# Patient Record
Sex: MALE | Race: OTHER | Employment: FULL TIME | URBAN - METROPOLITAN AREA
[De-identification: names, ages, dates, MRNs, and addresses within clinical notes are randomized per-mention and may not be internally consistent; named-entity substitution may affect disease eponyms.]

---

## 2017-04-07 ENCOUNTER — HOSPITAL ENCOUNTER (EMERGENCY)
Age: 22
Discharge: HOME | End: 2017-04-07
Payer: COMMERCIAL

## 2017-04-07 ENCOUNTER — APPOINTMENT (OUTPATIENT)
Dept: CT IMAGING | Age: 22
End: 2017-04-07
Payer: COMMERCIAL

## 2017-04-07 VITALS
BODY MASS INDEX: 26.48 KG/M2 | DIASTOLIC BLOOD PRESSURE: 54 MMHG | WEIGHT: 185 LBS | HEART RATE: 91 BPM | RESPIRATION RATE: 16 BRPM | OXYGEN SATURATION: 100 % | TEMPERATURE: 98.5 F | HEIGHT: 70 IN | SYSTOLIC BLOOD PRESSURE: 119 MMHG

## 2017-04-07 DIAGNOSIS — N30.00 ACUTE CYSTITIS WITHOUT HEMATURIA: ICD-10-CM

## 2017-04-07 DIAGNOSIS — R11.11 NON-INTRACTABLE VOMITING WITHOUT NAUSEA, UNSPECIFIED VOMITING TYPE: ICD-10-CM

## 2017-04-07 DIAGNOSIS — R19.7 DIARRHEA, UNSPECIFIED TYPE: ICD-10-CM

## 2017-04-07 DIAGNOSIS — S39.012A STRAIN OF MUSCLE, FASCIA AND TENDON OF LOWER BACK, INITIAL ENCOUNTER: Primary | ICD-10-CM

## 2017-04-07 LAB
ALBUMIN SERPL-MCNC: 4.9 G/DL (ref 3.9–4.9)
ALP BLD-CCNC: 74 U/L (ref 35–104)
ALT SERPL-CCNC: 14 U/L (ref 0–41)
AMPHETAMINE SCREEN, URINE: NORMAL
ANION GAP SERPL CALCULATED.3IONS-SCNC: 11 MEQ/L (ref 7–13)
AST SERPL-CCNC: 18 U/L (ref 0–40)
BACTERIA: NORMAL /HPF
BARBITURATE SCREEN URINE: NORMAL
BASOPHILS ABSOLUTE: 0 K/UL (ref 0–0.2)
BASOPHILS RELATIVE PERCENT: 0.3 %
BENZODIAZEPINE SCREEN, URINE: NORMAL
BILIRUB SERPL-MCNC: 0.7 MG/DL (ref 0–1.2)
BILIRUBIN URINE: ABNORMAL
BLOOD, URINE: NEGATIVE
BUN BLDV-MCNC: 17 MG/DL (ref 6–20)
CALCIUM SERPL-MCNC: 9.3 MG/DL (ref 8.6–10.2)
CANNABINOID SCREEN URINE: NORMAL
CASTS: NORMAL /LPF
CHLORIDE BLD-SCNC: 104 MEQ/L (ref 98–107)
CLARITY: CLEAR
CO2: 25 MEQ/L (ref 22–29)
COCAINE METABOLITE SCREEN URINE: NORMAL
COLOR: ABNORMAL
CREAT SERPL-MCNC: 0.89 MG/DL (ref 0.7–1.2)
EOSINOPHILS ABSOLUTE: 0 K/UL (ref 0–0.7)
EOSINOPHILS RELATIVE PERCENT: 0.1 %
ETHANOL PERCENT: NORMAL G/DL
ETHANOL: <10 MG/DL (ref 0–0.08)
GFR AFRICAN AMERICAN: >60
GFR NON-AFRICAN AMERICAN: >60
GLOBULIN: 2.3 G/DL (ref 2.3–3.5)
GLUCOSE BLD-MCNC: 113 MG/DL (ref 74–109)
GLUCOSE URINE: NEGATIVE MG/DL
HCT VFR BLD CALC: 47 % (ref 42–52)
HEMOGLOBIN: 16.1 G/DL (ref 14–18)
KETONES, URINE: ABNORMAL MG/DL
LACTIC ACID: 1.2 MMOL/L (ref 0.5–2.2)
LEUKOCYTE ESTERASE, URINE: ABNORMAL
LIPASE: 13 U/L (ref 13–60)
LYMPHOCYTES ABSOLUTE: 0.1 K/UL (ref 1–4.8)
LYMPHOCYTES RELATIVE PERCENT: 1.1 %
Lab: NORMAL
MCH RBC QN AUTO: 29.7 PG (ref 27–31.3)
MCHC RBC AUTO-ENTMCNC: 34.3 % (ref 33–37)
MCV RBC AUTO: 86.6 FL (ref 80–100)
MONOCYTES ABSOLUTE: 0.5 K/UL (ref 0.2–0.8)
MONOCYTES RELATIVE PERCENT: 4.3 %
MUCUS: PRESENT
NEUTROPHILS ABSOLUTE: 10 K/UL (ref 1.4–6.5)
NEUTROPHILS RELATIVE PERCENT: 94.2 %
NITRITE, URINE: NEGATIVE
OPIATE SCREEN URINE: NORMAL
PDW BLD-RTO: 13 % (ref 11.5–14.5)
PH UA: 6 (ref 5–9)
PHENCYCLIDINE SCREEN URINE: NORMAL
PLATELET # BLD: 201 K/UL (ref 130–400)
POTASSIUM SERPL-SCNC: 4 MEQ/L (ref 3.5–5.1)
PROTEIN UA: 30 MG/DL
RBC # BLD: 5.42 M/UL (ref 4.7–6.1)
RBC UA: NORMAL /HPF (ref 0–2)
SODIUM BLD-SCNC: 140 MEQ/L (ref 132–144)
SPECIFIC GRAVITY UA: 1.04 (ref 1–1.03)
TOTAL PROTEIN: 7.2 G/DL (ref 6.4–8.1)
URINE REFLEX TO CULTURE: YES
UROBILINOGEN, URINE: 1 E.U./DL
WBC # BLD: 10.6 K/UL (ref 4.8–10.8)
WBC UA: NORMAL /HPF (ref 0–5)

## 2017-04-07 PROCEDURE — 96375 TX/PRO/DX INJ NEW DRUG ADDON: CPT

## 2017-04-07 PROCEDURE — 99284 EMERGENCY DEPT VISIT MOD MDM: CPT

## 2017-04-07 PROCEDURE — G0480 DRUG TEST DEF 1-7 CLASSES: HCPCS

## 2017-04-07 PROCEDURE — 2580000003 HC RX 258: Performed by: PHYSICIAN ASSISTANT

## 2017-04-07 PROCEDURE — 87086 URINE CULTURE/COLONY COUNT: CPT

## 2017-04-07 PROCEDURE — 74176 CT ABD & PELVIS W/O CONTRAST: CPT

## 2017-04-07 PROCEDURE — 36415 COLL VENOUS BLD VENIPUNCTURE: CPT

## 2017-04-07 PROCEDURE — 83605 ASSAY OF LACTIC ACID: CPT

## 2017-04-07 PROCEDURE — 80307 DRUG TEST PRSMV CHEM ANLYZR: CPT

## 2017-04-07 PROCEDURE — 81001 URINALYSIS AUTO W/SCOPE: CPT

## 2017-04-07 PROCEDURE — 96374 THER/PROPH/DIAG INJ IV PUSH: CPT

## 2017-04-07 PROCEDURE — 6360000002 HC RX W HCPCS: Performed by: PHYSICIAN ASSISTANT

## 2017-04-07 PROCEDURE — 80053 COMPREHEN METABOLIC PANEL: CPT

## 2017-04-07 PROCEDURE — 83690 ASSAY OF LIPASE: CPT

## 2017-04-07 PROCEDURE — 85025 COMPLETE CBC W/AUTO DIFF WBC: CPT

## 2017-04-07 RX ORDER — KETOROLAC TROMETHAMINE 30 MG/ML
30 INJECTION, SOLUTION INTRAMUSCULAR; INTRAVENOUS ONCE
Status: COMPLETED | OUTPATIENT
Start: 2017-04-07 | End: 2017-04-07

## 2017-04-07 RX ORDER — ONDANSETRON 4 MG/1
4 TABLET, FILM COATED ORAL EVERY 8 HOURS PRN
Qty: 12 TABLET | Refills: 0 | Status: SHIPPED | OUTPATIENT
Start: 2017-04-07 | End: 2018-08-22 | Stop reason: ALTCHOICE

## 2017-04-07 RX ORDER — SULFAMETHOXAZOLE AND TRIMETHOPRIM 800; 160 MG/1; MG/1
1 TABLET ORAL 2 TIMES DAILY
Qty: 10 TABLET | Refills: 0 | Status: SHIPPED | OUTPATIENT
Start: 2017-04-07 | End: 2017-04-12

## 2017-04-07 RX ORDER — ONDANSETRON 2 MG/ML
4 INJECTION INTRAMUSCULAR; INTRAVENOUS ONCE
Status: COMPLETED | OUTPATIENT
Start: 2017-04-07 | End: 2017-04-07

## 2017-04-07 RX ORDER — NAPROXEN 500 MG/1
500 TABLET ORAL 2 TIMES DAILY WITH MEALS
Qty: 30 TABLET | Refills: 0 | Status: SHIPPED | OUTPATIENT
Start: 2017-04-07 | End: 2018-08-22 | Stop reason: ALTCHOICE

## 2017-04-07 RX ORDER — 0.9 % SODIUM CHLORIDE 0.9 %
1000 INTRAVENOUS SOLUTION INTRAVENOUS ONCE
Status: COMPLETED | OUTPATIENT
Start: 2017-04-07 | End: 2017-04-07

## 2017-04-07 RX ORDER — CYCLOBENZAPRINE HCL 10 MG
10 TABLET ORAL 3 TIMES DAILY PRN
Qty: 30 TABLET | Refills: 0 | Status: SHIPPED | OUTPATIENT
Start: 2017-04-07 | End: 2017-04-17

## 2017-04-07 RX ADMIN — ONDANSETRON 4 MG: 2 INJECTION, SOLUTION INTRAMUSCULAR; INTRAVENOUS at 16:16

## 2017-04-07 RX ADMIN — SODIUM CHLORIDE 1000 ML: 9 INJECTION, SOLUTION INTRAVENOUS at 16:16

## 2017-04-07 RX ADMIN — KETOROLAC TROMETHAMINE 30 MG: 30 INJECTION, SOLUTION INTRAMUSCULAR; INTRAVENOUS at 16:16

## 2017-04-07 ASSESSMENT — PAIN DESCRIPTION - ORIENTATION: ORIENTATION: RIGHT;LEFT

## 2017-04-07 ASSESSMENT — PAIN SCALES - GENERAL
PAINLEVEL_OUTOF10: 7
PAINLEVEL_OUTOF10: 3
PAINLEVEL_OUTOF10: 5
PAINLEVEL_OUTOF10: 8

## 2017-04-07 ASSESSMENT — ENCOUNTER SYMPTOMS
NAUSEA: 1
VOMITING: 1
WHEEZING: 0
COUGH: 0
CONSTIPATION: 0
SORE THROAT: 0
EYE DISCHARGE: 0
SHORTNESS OF BREATH: 0
BACK PAIN: 1
STRIDOR: 0
CHOKING: 0
ABDOMINAL PAIN: 0
COLOR CHANGE: 0
DIARRHEA: 1
RHINORRHEA: 0
ABDOMINAL DISTENTION: 0

## 2017-04-07 ASSESSMENT — PAIN DESCRIPTION - LOCATION
LOCATION: FLANK
LOCATION: BACK

## 2017-04-07 ASSESSMENT — PAIN DESCRIPTION - PAIN TYPE: TYPE: ACUTE PAIN

## 2017-04-09 LAB — URINE CULTURE, ROUTINE: NORMAL

## 2018-08-22 ENCOUNTER — OFFICE VISIT (OUTPATIENT)
Dept: FAMILY MEDICINE CLINIC | Age: 23
End: 2018-08-22
Payer: COMMERCIAL

## 2018-08-22 VITALS
DIASTOLIC BLOOD PRESSURE: 82 MMHG | OXYGEN SATURATION: 99 % | HEART RATE: 98 BPM | WEIGHT: 204.2 LBS | TEMPERATURE: 98.4 F | SYSTOLIC BLOOD PRESSURE: 138 MMHG | BODY MASS INDEX: 28.59 KG/M2 | HEIGHT: 71 IN | RESPIRATION RATE: 16 BRPM

## 2018-08-22 DIAGNOSIS — Z00.00 HEALTH CARE MAINTENANCE: ICD-10-CM

## 2018-08-22 DIAGNOSIS — I10 HYPERTENSION, UNSPECIFIED TYPE: Primary | ICD-10-CM

## 2018-08-22 PROCEDURE — 99203 OFFICE O/P NEW LOW 30 MIN: CPT | Performed by: INTERNAL MEDICINE

## 2018-08-22 RX ORDER — MELOXICAM 15 MG/1
TABLET ORAL
Refills: 0 | COMMUNITY
Start: 2018-08-15 | End: 2020-12-07 | Stop reason: ALTCHOICE

## 2018-08-22 RX ORDER — DOXYCYCLINE HYCLATE 100 MG/1
CAPSULE ORAL
Refills: 0 | COMMUNITY
Start: 2018-08-05 | End: 2018-09-21 | Stop reason: ALTCHOICE

## 2018-08-22 RX ORDER — BLOOD PRESSURE TEST KIT
1 KIT MISCELLANEOUS DAILY
Qty: 1 KIT | Refills: 0 | Status: SHIPPED | OUTPATIENT
Start: 2018-08-22 | End: 2020-12-07 | Stop reason: ALTCHOICE

## 2018-08-22 RX ORDER — TIZANIDINE 4 MG/1
TABLET ORAL
Refills: 1 | COMMUNITY
Start: 2018-08-15 | End: 2020-12-07 | Stop reason: ALTCHOICE

## 2018-08-22 ASSESSMENT — ENCOUNTER SYMPTOMS
VOMITING: 0
NAUSEA: 0
SORE THROAT: 0
RHINORRHEA: 0
WHEEZING: 0
SHORTNESS OF BREATH: 0
COUGH: 0
SINUS PAIN: 0
SINUS PRESSURE: 0
DIARRHEA: 0
ABDOMINAL PAIN: 0

## 2018-08-22 ASSESSMENT — PATIENT HEALTH QUESTIONNAIRE - PHQ9
SUM OF ALL RESPONSES TO PHQ QUESTIONS 1-9: 0
SUM OF ALL RESPONSES TO PHQ QUESTIONS 1-9: 0
SUM OF ALL RESPONSES TO PHQ9 QUESTIONS 1 & 2: 0
2. FEELING DOWN, DEPRESSED OR HOPELESS: 0
1. LITTLE INTEREST OR PLEASURE IN DOING THINGS: 0

## 2018-08-22 NOTE — PROGRESS NOTES
Subjective:      Patient ID: Denzel Geller is a 26 y.o. male    New pt to establish care    HPI     Pt presents to establish care with new PCP. Presented to [facility] with urinary retention and dysuria (whoch he claism is chronic). Urinalysis was negative for UTI. CT pelvis and US testes were negative. He's since been referred to Urology Dr. Sho Marte on Monday. Claims he's been previously treated for UTI in the past but tested negative for STD. Vitals 8/22/2018 4/7/2017 4/7/2017 4/7/2017 4/7/2017 4/5/3741   SYSTOLIC 957  120 395     DIASTOLIC 82  54 64       Vitals 7/0/6705   SYSTOLIC 793   DIASTOLIC 74     Sent to PCP due to BP elevation which he said is not new. Claims SBP in the [facility] ER was 167 and previous readings have been in the 140s. No treatment or evaluation-per pt. No one-sided weakness or slurred speech. Attests to dull left chest pain rated 1/10 and non radiating. No cough or SOB, no wheezing, no fever or chills. Smokes 1 cigar per week, denies illicit drug use. Attests to consumption of 3 cups of coffee and 2L Pepsi daily. Denies snoring, consumes high salt diet. Positive family hx hypertension. History reviewed. No pertinent past medical history. History reviewed. No pertinent surgical history. Social History     Social History    Marital status: Single     Spouse name: N/A    Number of children: N/A    Years of education: N/A     Occupational History    Not on file. Social History Main Topics    Smoking status: Current Some Day Smoker     Years: 1.00     Types: Cigars    Smokeless tobacco: Never Used    Alcohol use Yes      Comment: occassional    Drug use: No    Sexual activity: Yes     Partners: Female     Other Topics Concern    Not on file     Social History Narrative    No narrative on file     History reviewed. No pertinent family history. Allergies:  Patient has no known allergies.   Patient Active Problem List   Diagnosis    Development delay     No current outpatient prescriptions on file prior to visit. No current facility-administered medications on file prior to visit. Review of Systems   Constitutional: Negative for chills, diaphoresis, fatigue and fever. HENT: Negative for congestion, ear discharge, ear pain, postnasal drip, rhinorrhea, sinus pain, sinus pressure, sneezing and sore throat. Respiratory: Negative for cough, shortness of breath and wheezing. Cardiovascular: Negative for chest pain and palpitations. Gastrointestinal: Negative for abdominal pain, diarrhea, nausea and vomiting. Endocrine: Negative for cold intolerance and heat intolerance. Genitourinary: Negative for dysuria and frequency. Neurological: Negative for dizziness and light-headedness. Psychiatric/Behavioral: Negative for decreased concentration. The patient is not nervous/anxious. Objective:   /82   Pulse 98   Temp 98.4 °F (36.9 °C) (Temporal)   Resp 16   Ht 5' 10.5\" (1.791 m)   Wt 204 lb 3.2 oz (92.6 kg)   SpO2 99%   BMI 28.89 kg/m²     Physical Exam   Constitutional: He is oriented to person, place, and time. He appears well-developed and well-nourished. No distress. Cardiovascular: Normal rate, regular rhythm and normal heart sounds. Pulmonary/Chest: Effort normal and breath sounds normal. No respiratory distress. Abdominal: Soft. Normal appearance and bowel sounds are normal. There is no hepatosplenomegaly. There is no tenderness. There is no CVA tenderness. Neurological: He is alert and oriented to person, place, and time. Assessment:       Diagnosis Orders   1. Hypertension, unspecified type  Blood Pressure KIT   2.  Health care maintenance  CBC Auto Differential    Comprehensive Metabolic Panel    TSH Without Reflex    Hemoglobin A1C    Hiv-1,-2 W/Reflex To Hiv-1 Western Blot         Plan:      Orders Placed This Encounter   Procedures    CBC Auto Differential     Standing Status:   Future     Standing Expiration Date:

## 2018-08-30 ENCOUNTER — TELEPHONE (OUTPATIENT)
Dept: FAMILY MEDICINE CLINIC | Age: 23
End: 2018-08-30

## 2018-09-01 NOTE — TELEPHONE ENCOUNTER
Please apprise pt to continue meds as same dose.  He needs to also continue BP measurements and call me with results in another week

## 2018-09-12 ENCOUNTER — TELEPHONE (OUTPATIENT)
Dept: FAMILY MEDICINE CLINIC | Age: 23
End: 2018-09-12

## 2018-09-12 DIAGNOSIS — I10 HYPERTENSION, UNSPECIFIED TYPE: Primary | ICD-10-CM

## 2018-09-12 RX ORDER — LISINOPRIL 10 MG/1
10 TABLET ORAL DAILY
Qty: 30 TABLET | Refills: 3 | Status: SHIPPED | OUTPATIENT
Start: 2018-09-12 | End: 2019-01-04 | Stop reason: SDUPTHER

## 2018-09-21 ENCOUNTER — OFFICE VISIT (OUTPATIENT)
Dept: FAMILY MEDICINE CLINIC | Age: 23
End: 2018-09-21
Payer: COMMERCIAL

## 2018-09-21 VITALS
OXYGEN SATURATION: 98 % | BODY MASS INDEX: 28.14 KG/M2 | TEMPERATURE: 98.6 F | SYSTOLIC BLOOD PRESSURE: 120 MMHG | WEIGHT: 201 LBS | DIASTOLIC BLOOD PRESSURE: 80 MMHG | RESPIRATION RATE: 18 BRPM | HEIGHT: 71 IN | HEART RATE: 114 BPM

## 2018-09-21 DIAGNOSIS — R00.0 TACHYCARDIA: ICD-10-CM

## 2018-09-21 DIAGNOSIS — I10 HYPERTENSION, UNSPECIFIED TYPE: Primary | ICD-10-CM

## 2018-09-21 DIAGNOSIS — M94.0 COSTOCHONDRITIS: ICD-10-CM

## 2018-09-21 PROCEDURE — 99213 OFFICE O/P EST LOW 20 MIN: CPT | Performed by: INTERNAL MEDICINE

## 2018-09-21 PROCEDURE — 93000 ELECTROCARDIOGRAM COMPLETE: CPT | Performed by: INTERNAL MEDICINE

## 2018-09-21 ASSESSMENT — ENCOUNTER SYMPTOMS
SINUS PAIN: 0
DIARRHEA: 0
SHORTNESS OF BREATH: 0
ABDOMINAL PAIN: 0
SORE THROAT: 0
VOMITING: 0
WHEEZING: 0
SINUS PRESSURE: 0
COUGH: 0
RHINORRHEA: 0
NAUSEA: 0

## 2018-09-21 NOTE — PROGRESS NOTES
Subjective:      Patient ID: Lanny Ga is a 26 y.o. male    Follow up for hypertension treatment    HPI     Pt presents for follow up regarding treatment of hypertension. Started on lisinopril. Blood pressure is now well controlled. Vitals 9/21/2018 8/22/2018 4/7/2017 4/7/2017 4/7/2017 7/4/1754   SYSTOLIC 366 063  788 163    DIASTOLIC 80 82  54 64    Pulse 114 98  91 88      Vitals 4/7/2017 6/5/6718   SYSTOLIC  520   DIASTOLIC  74   Pulse  651        Complains of previously being told his heart rate is fast since many years. Pt is tachycardic today as well. Pt denies SOB but attests to crampy left sided chest pain rated 1/10 and non radiating. Chest pain is unrelated to exertion or rest.   Attests to hx of acid reflux but different from current chest pain. Denies use of albuterol. But attests to continued intake of caffeinated drinks (3 cups of coffee and 2L pepsi daily). No fever or chills, smokes 1 cigar per week, denies illicit drug use. No past medical history on file. No past surgical history on file. Social History     Social History    Marital status: Single     Spouse name: N/A    Number of children: N/A    Years of education: N/A     Occupational History    Not on file. Social History Main Topics    Smoking status: Current Some Day Smoker     Years: 1.00     Types: Cigars    Smokeless tobacco: Never Used    Alcohol use Yes      Comment: occassional    Drug use: No    Sexual activity: Yes     Partners: Female     Other Topics Concern    Not on file     Social History Narrative    No narrative on file     No family history on file. Allergies:  Patient has no known allergies.   Patient Active Problem List   Diagnosis    Development delay     Current Outpatient Prescriptions on File Prior to Visit   Medication Sig Dispense Refill    lisinopril (PRINIVIL;ZESTRIL) 10 MG tablet Take 1 tablet by mouth daily 30 tablet 3    tiZANidine (ZANAFLEX) 4 MG tablet   1    Hypertension, unspecified type     2. Tachycardia  EKG 12 lead   3. Costochondritis           Plan:      Orders Placed This Encounter   Procedures    EKG 12 lead     Order Specific Question:   Reason for Exam?     Answer:   Chest pain     Order Specific Question:   Reason for Exam?     Answer: Tachycardia     EKG showed NSR. Palpitations likely due to caffeinated drink consumption. Pt advised to avoid /reduce intake. He seemed reluctant to make this move. OTC NSAIDs prn chest pain. No orders of the defined types were placed in this encounter. Return in about 1 month (around 10/21/2018) for assessment of response to treatment, review of test results.

## 2018-10-04 ENCOUNTER — TELEPHONE (OUTPATIENT)
Dept: FAMILY MEDICINE CLINIC | Age: 23
End: 2018-10-04

## 2018-10-04 DIAGNOSIS — R00.0 TACHYCARDIA: Primary | ICD-10-CM

## 2018-10-10 ENCOUNTER — OFFICE VISIT (OUTPATIENT)
Dept: CARDIOLOGY CLINIC | Age: 23
End: 2018-10-10
Payer: COMMERCIAL

## 2018-10-10 VITALS
SYSTOLIC BLOOD PRESSURE: 130 MMHG | HEART RATE: 83 BPM | OXYGEN SATURATION: 98 % | WEIGHT: 205.8 LBS | DIASTOLIC BLOOD PRESSURE: 78 MMHG | HEIGHT: 71 IN | BODY MASS INDEX: 28.81 KG/M2

## 2018-10-10 DIAGNOSIS — R07.9 CHEST PAIN IN ADULT: ICD-10-CM

## 2018-10-10 DIAGNOSIS — I10 HYPERTENSION GOAL BP (BLOOD PRESSURE) < 130/80: Primary | ICD-10-CM

## 2018-10-10 DIAGNOSIS — R00.0 TACHYCARDIA: ICD-10-CM

## 2018-10-10 PROCEDURE — 99214 OFFICE O/P EST MOD 30 MIN: CPT | Performed by: INTERNAL MEDICINE

## 2018-10-10 RX ORDER — PHENAZOPYRIDINE HYDROCHLORIDE 200 MG/1
200 TABLET, FILM COATED ORAL 3 TIMES DAILY PRN
COMMUNITY
Start: 2018-10-09 | End: 2019-09-19 | Stop reason: SDUPTHER

## 2018-10-10 ASSESSMENT — ENCOUNTER SYMPTOMS
SINUS PAIN: 0
FACIAL SWELLING: 0
COLOR CHANGE: 0
SHORTNESS OF BREATH: 0
BLOOD IN STOOL: 0
ABDOMINAL DISTENTION: 0
EYE DISCHARGE: 0
EYE PAIN: 0
ABDOMINAL PAIN: 0
CHEST TIGHTNESS: 0
SORE THROAT: 0
ANAL BLEEDING: 0
BACK PAIN: 1

## 2018-10-10 NOTE — PROGRESS NOTES
04/07/2017    GLUCOSE 95 12/22/2011    PROT 7.2 04/07/2017    LABALBU 4.9 04/07/2017    LABALBU 5.0 12/22/2011    CALCIUM 9.3 04/07/2017    BILITOT 0.7 04/07/2017    ALKPHOS 74 04/07/2017    AST 18 04/07/2017    ALT 14 04/07/2017     BMP:    Lab Results   Component Value Date     04/07/2017    K 4.0 04/07/2017     04/07/2017    CO2 25 04/07/2017    BUN 17 04/07/2017    LABALBU 4.9 04/07/2017    LABALBU 5.0 12/22/2011    CREATININE 0.89 04/07/2017    CALCIUM 9.3 04/07/2017    GFRAA >60.0 04/07/2017    LABGLOM >60.0 04/07/2017    GLUCOSE 113 04/07/2017    GLUCOSE 95 12/22/2011     Magnesium:  No results found for: MG  TSH:No results found for: TSHFT4, TSH    Patient Active Problem List   Diagnosis    Development delay       There are no discontinued medications. Modified Medications    No medications on file       No orders of the defined types were placed in this encounter. Assessment/Plan:     Diagnosis Orders   1. Hypertension goal BP (blood pressure) < 130/80  ECHO Complete 2D W Doppler W Color   2. Tachycardia  ECHO Complete 2D W Doppler W Color   3. Chest pain in adult  NM MYOCARDIAL SPECT REST EXERCISE OR RX      The chest pain was thought to be rather atypical.  in a young person there is remote possibility of vascular abnormality but the probability is low. Patient does have labile hypertension at this time. We will do an exercise tolerance test to assess his response off blood pressure to the exercise. We'll check an echocardiogram.  Patient does not need aspirin at this time. Electrocardiogram was normal.  The patient has a faint systolic murmur in the aortic area and peripheral pulses are symmetrical.  This was most likely thought to be a flow murmur. Patient was advised to monitor blood pressure at home and bring the list.  Coronary anatomy and symptoms related to CAD /Atherosclerosis were discussed in detail. Patient expressed understanding of these issues.   Risk factors for

## 2018-10-30 ENCOUNTER — OFFICE VISIT (OUTPATIENT)
Dept: FAMILY MEDICINE CLINIC | Age: 23
End: 2018-10-30
Payer: COMMERCIAL

## 2018-10-30 VITALS
WEIGHT: 200.6 LBS | DIASTOLIC BLOOD PRESSURE: 62 MMHG | BODY MASS INDEX: 28.38 KG/M2 | HEART RATE: 81 BPM | SYSTOLIC BLOOD PRESSURE: 122 MMHG | TEMPERATURE: 97.6 F | RESPIRATION RATE: 12 BRPM

## 2018-10-30 DIAGNOSIS — Z87.898 HISTORY OF TACHYCARDIA: ICD-10-CM

## 2018-10-30 DIAGNOSIS — I10 HYPERTENSION, UNSPECIFIED TYPE: Primary | ICD-10-CM

## 2018-10-30 PROCEDURE — 99213 OFFICE O/P EST LOW 20 MIN: CPT | Performed by: INTERNAL MEDICINE

## 2018-10-30 RX ORDER — BACLOFEN 10 MG/1
TABLET ORAL
Refills: 0 | COMMUNITY
Start: 2018-10-25 | End: 2019-09-19

## 2018-10-30 RX ORDER — CIPROFLOXACIN 500 MG/1
TABLET, FILM COATED ORAL
Refills: 0 | COMMUNITY
Start: 2018-10-26 | End: 2019-09-19 | Stop reason: ALTCHOICE

## 2018-10-30 RX ORDER — DICLOFENAC SODIUM 75 MG/1
TABLET, DELAYED RELEASE ORAL
Refills: 0 | COMMUNITY
Start: 2018-10-24 | End: 2019-09-19

## 2018-10-30 ASSESSMENT — ENCOUNTER SYMPTOMS
DIARRHEA: 0
NAUSEA: 0
VOMITING: 0
ABDOMINAL PAIN: 0
SINUS PAIN: 0
SORE THROAT: 0
WHEEZING: 0
COUGH: 0
SHORTNESS OF BREATH: 0
RHINORRHEA: 0
SINUS PRESSURE: 0

## 2018-11-27 DIAGNOSIS — R00.0 TACHYCARDIA: ICD-10-CM

## 2018-11-27 DIAGNOSIS — I10 HYPERTENSION GOAL BP (BLOOD PRESSURE) < 130/80: ICD-10-CM

## 2019-01-04 ENCOUNTER — OFFICE VISIT (OUTPATIENT)
Dept: FAMILY MEDICINE CLINIC | Age: 24
End: 2019-01-04
Payer: COMMERCIAL

## 2019-01-04 VITALS
BODY MASS INDEX: 27.72 KG/M2 | WEIGHT: 198 LBS | RESPIRATION RATE: 18 BRPM | SYSTOLIC BLOOD PRESSURE: 122 MMHG | OXYGEN SATURATION: 99 % | DIASTOLIC BLOOD PRESSURE: 72 MMHG | HEART RATE: 79 BPM | HEIGHT: 71 IN | TEMPERATURE: 97.3 F

## 2019-01-04 DIAGNOSIS — R07.0 THROAT DISCOMFORT: Primary | ICD-10-CM

## 2019-01-04 DIAGNOSIS — I10 HYPERTENSION, UNSPECIFIED TYPE: ICD-10-CM

## 2019-01-04 DIAGNOSIS — B07.9 VIRAL WART ON FINGER: ICD-10-CM

## 2019-01-04 PROCEDURE — 99213 OFFICE O/P EST LOW 20 MIN: CPT | Performed by: INTERNAL MEDICINE

## 2019-01-04 RX ORDER — PREDNISONE 20 MG/1
40 TABLET ORAL DAILY
Qty: 14 TABLET | Refills: 0 | Status: SHIPPED | OUTPATIENT
Start: 2019-01-04 | End: 2019-01-11

## 2019-01-04 RX ORDER — METAXALONE 800 MG/1
TABLET ORAL
Refills: 0 | COMMUNITY
Start: 2018-12-06 | End: 2019-09-19

## 2019-01-04 RX ORDER — LISINOPRIL 10 MG/1
10 TABLET ORAL DAILY
Qty: 90 TABLET | Refills: 3 | Status: SHIPPED | OUTPATIENT
Start: 2019-01-04 | End: 2019-09-19 | Stop reason: SDUPTHER

## 2019-01-04 RX ORDER — LIDOCAINE 50 MG/G
PATCH TOPICAL
Refills: 0 | COMMUNITY
Start: 2018-11-19 | End: 2019-01-30 | Stop reason: ALTCHOICE

## 2019-01-06 ASSESSMENT — ENCOUNTER SYMPTOMS
DIARRHEA: 0
SORE THROAT: 0
VOICE CHANGE: 0
WHEEZING: 0
COUGH: 0
VOMITING: 0
SHORTNESS OF BREATH: 0
SINUS PAIN: 0
NAUSEA: 0
ABDOMINAL PAIN: 0
TROUBLE SWALLOWING: 0
SINUS PRESSURE: 0

## 2019-01-18 ENCOUNTER — TELEPHONE (OUTPATIENT)
Dept: FAMILY MEDICINE CLINIC | Age: 24
End: 2019-01-18

## 2019-01-30 ENCOUNTER — OFFICE VISIT (OUTPATIENT)
Dept: FAMILY MEDICINE CLINIC | Age: 24
End: 2019-01-30
Payer: COMMERCIAL

## 2019-01-30 VITALS
RESPIRATION RATE: 18 BRPM | BODY MASS INDEX: 28.14 KG/M2 | SYSTOLIC BLOOD PRESSURE: 124 MMHG | OXYGEN SATURATION: 98 % | HEIGHT: 71 IN | TEMPERATURE: 97.8 F | WEIGHT: 201 LBS | HEART RATE: 73 BPM | DIASTOLIC BLOOD PRESSURE: 66 MMHG

## 2019-01-30 DIAGNOSIS — K21.9 GASTROESOPHAGEAL REFLUX DISEASE WITHOUT ESOPHAGITIS: Primary | ICD-10-CM

## 2019-01-30 PROCEDURE — 99213 OFFICE O/P EST LOW 20 MIN: CPT | Performed by: INTERNAL MEDICINE

## 2019-01-30 RX ORDER — LIDOCAINE 36 MG/1
PATCH TOPICAL
COMMUNITY
Start: 2019-01-25 | End: 2020-12-07 | Stop reason: ALTCHOICE

## 2019-01-30 RX ORDER — PANTOPRAZOLE SODIUM 20 MG/1
20 TABLET, DELAYED RELEASE ORAL
Qty: 30 TABLET | Refills: 1 | Status: SHIPPED | OUTPATIENT
Start: 2019-01-30 | End: 2019-09-19

## 2019-01-30 ASSESSMENT — ENCOUNTER SYMPTOMS
ABDOMINAL PAIN: 0
SINUS PAIN: 0
NAUSEA: 0
COUGH: 0
DIARRHEA: 0
SORE THROAT: 1
SINUS PRESSURE: 0
SHORTNESS OF BREATH: 0
RHINORRHEA: 0
WHEEZING: 0
TROUBLE SWALLOWING: 0
VOMITING: 0
VOICE CHANGE: 0

## 2019-02-21 ENCOUNTER — TELEPHONE (OUTPATIENT)
Dept: FAMILY MEDICINE CLINIC | Age: 24
End: 2019-02-21

## 2019-02-21 DIAGNOSIS — R13.10 DYSPHAGIA, UNSPECIFIED TYPE: Primary | ICD-10-CM

## 2019-03-12 ENCOUNTER — OFFICE VISIT (OUTPATIENT)
Dept: GASTROENTEROLOGY | Age: 24
End: 2019-03-12
Payer: COMMERCIAL

## 2019-03-12 VITALS
WEIGHT: 205 LBS | RESPIRATION RATE: 16 BRPM | HEART RATE: 81 BPM | SYSTOLIC BLOOD PRESSURE: 139 MMHG | BODY MASS INDEX: 29 KG/M2 | DIASTOLIC BLOOD PRESSURE: 120 MMHG

## 2019-03-12 DIAGNOSIS — R09.89 GLOBUS SENSATION: Primary | ICD-10-CM

## 2019-03-12 DIAGNOSIS — R12 HEARTBURN: ICD-10-CM

## 2019-03-12 DIAGNOSIS — R13.10 DYSPHAGIA, UNSPECIFIED TYPE: ICD-10-CM

## 2019-03-12 PROCEDURE — 99203 OFFICE O/P NEW LOW 30 MIN: CPT | Performed by: INTERNAL MEDICINE

## 2019-03-25 ENCOUNTER — HOSPITAL ENCOUNTER (OUTPATIENT)
Age: 24
Setting detail: OUTPATIENT SURGERY
Discharge: HOME | End: 2019-03-25
Attending: INTERNAL MEDICINE | Admitting: INTERNAL MEDICINE
Payer: COMMERCIAL

## 2019-03-25 ENCOUNTER — ANESTHESIA (OUTPATIENT)
Dept: ENDOSCOPY | Age: 24
End: 2019-03-25
Payer: COMMERCIAL

## 2019-03-25 ENCOUNTER — ANESTHESIA EVENT (OUTPATIENT)
Dept: ENDOSCOPY | Age: 24
End: 2019-03-25
Payer: COMMERCIAL

## 2019-03-25 VITALS
OXYGEN SATURATION: 98 % | DIASTOLIC BLOOD PRESSURE: 60 MMHG | SYSTOLIC BLOOD PRESSURE: 114 MMHG | RESPIRATION RATE: 10 BRPM

## 2019-03-25 VITALS
SYSTOLIC BLOOD PRESSURE: 99 MMHG | OXYGEN SATURATION: 96 % | HEIGHT: 69 IN | WEIGHT: 200 LBS | HEART RATE: 65 BPM | RESPIRATION RATE: 20 BRPM | DIASTOLIC BLOOD PRESSURE: 62 MMHG | TEMPERATURE: 97.7 F | BODY MASS INDEX: 29.62 KG/M2

## 2019-03-25 PROCEDURE — 3700000000 HC ANESTHESIA ATTENDED CARE: Performed by: INTERNAL MEDICINE

## 2019-03-25 PROCEDURE — 7100000010 HC PHASE II RECOVERY - FIRST 15 MIN: Performed by: INTERNAL MEDICINE

## 2019-03-25 PROCEDURE — 88305 TISSUE EXAM BY PATHOLOGIST: CPT

## 2019-03-25 PROCEDURE — 2500000003 HC RX 250 WO HCPCS: Performed by: NURSE ANESTHETIST, CERTIFIED REGISTERED

## 2019-03-25 PROCEDURE — 2580000003 HC RX 258: Performed by: NURSE ANESTHETIST, CERTIFIED REGISTERED

## 2019-03-25 PROCEDURE — 6360000002 HC RX W HCPCS: Performed by: NURSE ANESTHETIST, CERTIFIED REGISTERED

## 2019-03-25 PROCEDURE — 7100000011 HC PHASE II RECOVERY - ADDTL 15 MIN: Performed by: INTERNAL MEDICINE

## 2019-03-25 PROCEDURE — 43239 EGD BIOPSY SINGLE/MULTIPLE: CPT | Performed by: INTERNAL MEDICINE

## 2019-03-25 PROCEDURE — 3609017100 HC EGD: Performed by: INTERNAL MEDICINE

## 2019-03-25 PROCEDURE — 88342 IMHCHEM/IMCYTCHM 1ST ANTB: CPT

## 2019-03-25 PROCEDURE — 2580000003 HC RX 258: Performed by: INTERNAL MEDICINE

## 2019-03-25 PROCEDURE — 43248 EGD GUIDE WIRE INSERTION: CPT | Performed by: INTERNAL MEDICINE

## 2019-03-25 RX ORDER — PROPOFOL 10 MG/ML
INJECTION, EMULSION INTRAVENOUS PRN
Status: DISCONTINUED | OUTPATIENT
Start: 2019-03-25 | End: 2019-03-25 | Stop reason: SDUPTHER

## 2019-03-25 RX ORDER — LIDOCAINE HYDROCHLORIDE 20 MG/ML
INJECTION, SOLUTION INFILTRATION; PERINEURAL PRN
Status: DISCONTINUED | OUTPATIENT
Start: 2019-03-25 | End: 2019-03-25 | Stop reason: SDUPTHER

## 2019-03-25 RX ORDER — ONDANSETRON 2 MG/ML
4 INJECTION INTRAMUSCULAR; INTRAVENOUS
Status: DISCONTINUED | OUTPATIENT
Start: 2019-03-25 | End: 2019-03-25 | Stop reason: HOSPADM

## 2019-03-25 RX ORDER — SODIUM CHLORIDE 9 MG/ML
INJECTION, SOLUTION INTRAVENOUS CONTINUOUS PRN
Status: DISCONTINUED | OUTPATIENT
Start: 2019-03-25 | End: 2019-03-25 | Stop reason: SDUPTHER

## 2019-03-25 RX ORDER — GABAPENTIN 300 MG/1
300 CAPSULE ORAL 3 TIMES DAILY
COMMUNITY
End: 2019-09-19 | Stop reason: ALTCHOICE

## 2019-03-25 RX ORDER — SODIUM CHLORIDE 9 MG/ML
INJECTION, SOLUTION INTRAVENOUS CONTINUOUS
Status: DISCONTINUED | OUTPATIENT
Start: 2019-03-25 | End: 2019-03-25 | Stop reason: HOSPADM

## 2019-03-25 RX ADMIN — SODIUM CHLORIDE: 9 INJECTION, SOLUTION INTRAVENOUS at 08:30

## 2019-03-25 RX ADMIN — LIDOCAINE HYDROCHLORIDE 60 MG: 20 INJECTION, SOLUTION INFILTRATION; PERINEURAL at 08:49

## 2019-03-25 RX ADMIN — PROPOFOL 450 MG: 10 INJECTION, EMULSION INTRAVENOUS at 08:49

## 2019-03-25 RX ADMIN — SODIUM CHLORIDE: 9 INJECTION, SOLUTION INTRAVENOUS at 08:49

## 2019-03-25 ASSESSMENT — PAIN SCALES - GENERAL: PAINLEVEL_OUTOF10: 5

## 2019-03-25 ASSESSMENT — PAIN DESCRIPTION - DESCRIPTORS: DESCRIPTORS: CONSTANT;PRESSURE

## 2019-03-25 ASSESSMENT — PAIN DESCRIPTION - FREQUENCY: FREQUENCY: CONTINUOUS

## 2019-03-25 ASSESSMENT — PAIN DESCRIPTION - LOCATION: LOCATION: BACK

## 2019-03-25 ASSESSMENT — PAIN - FUNCTIONAL ASSESSMENT: PAIN_FUNCTIONAL_ASSESSMENT: 0-10

## 2019-05-13 ENCOUNTER — OFFICE VISIT (OUTPATIENT)
Dept: FAMILY MEDICINE CLINIC | Age: 24
End: 2019-05-13
Payer: COMMERCIAL

## 2019-05-13 VITALS
SYSTOLIC BLOOD PRESSURE: 138 MMHG | HEIGHT: 71 IN | WEIGHT: 199.8 LBS | OXYGEN SATURATION: 100 % | BODY MASS INDEX: 27.97 KG/M2 | HEART RATE: 83 BPM | RESPIRATION RATE: 12 BRPM | DIASTOLIC BLOOD PRESSURE: 72 MMHG | TEMPERATURE: 98.1 F

## 2019-05-13 DIAGNOSIS — Z01.818 PRE-OP EVALUATION: Primary | ICD-10-CM

## 2019-05-13 LAB
ALBUMIN: 5 G/DL (ref 3.4–5)
ALP BLD-CCNC: 71 U/L (ref 33–120)
ALT SERPL-CCNC: 16 U/L (ref 10–52)
ANION GAP SERPL CALCULATED.3IONS-SCNC: 12 MMOL/L (ref 10–20)
APTT: 34 SEC (ref 28–38)
AST SERPL-CCNC: 17 U/L (ref 9–39)
BASOPHILS # BLD: 0.03 X10E9/L (ref 0–0.1)
BASOPHILS RELATIVE PERCENT: 0.6 % (ref 0–2)
BICARBONATE: 28 MMOL/L (ref 21–32)
BILIRUB SERPL-MCNC: 0.4 MG/DL (ref 0–1.2)
CALCIUM SERPL-MCNC: 9.6 MG/DL (ref 8.6–10.3)
CHLORIDE BLD-SCNC: 103 MMOL/L (ref 98–107)
CREAT SERPL-MCNC: 0.9 MG/DL (ref 0.5–1.3)
EOSINOPHIL # BLD: 0.06 X10E9/L (ref 0–0.7)
EOSINOPHILS RELATIVE PERCENT: 1.1 % (ref 0–6)
ERYTHROCYTE [DISTWIDTH] IN BLOOD BY AUTOMATED COUNT: 12 % (ref 11.5–14)
GFR AFRICAN AMERICAN: >60 ML/MIN/1.73M2
GFR NON-AFRICAN AMERICAN: >60 ML/MIN/1.73M2
GLUCOSE: 58 MG/DL (ref 74–99)
HCT VFR BLD CALC: 42.3 % (ref 41–52)
HEMOGLOBIN: 14.2 G/DL (ref 13.5–17)
IMMATURE GRANULOCYTES %: 0.2 % (ref 0–0.9)
INR BLD: 1.1 (ref 0.9–1.1)
LYMPHOCYTES # BLD: 27.2 % (ref 13–44)
LYMPHOCYTES RELATIVE PERCENT: 1.48 X10E9/L (ref 1.2–4.8)
MCHC RBC AUTO-ENTMCNC: 33.6 G/DL (ref 32–36)
MCV RBC AUTO: 90 FL (ref 80–100)
MONOCYTES # BLD: 0.45 X10E9/L (ref 0.1–1)
MONOCYTES RELATIVE PERCENT: 8.3 % (ref 2–10)
NEUTROPHILS RELATIVE PERCENT: 62.6 % (ref 40–80)
NEUTROPHILS: 3.41 X10E9/L (ref 1.2–7.7)
PLATELET # BLD: 230 X10E9/L (ref 150–450)
POTASSIUM SERPL-SCNC: 3.7 MMOL/L (ref 3.5–5.3)
PROTHROMBIN TIME: 12.7 SEC (ref 9.7–12.7)
RBC # BLD: 4.7 X10E12/L (ref 4.5–5.9)
SODIUM BLD-SCNC: 139 MMOL/L (ref 136–145)
TOTAL PROTEIN: 7.2 G/DL (ref 6.4–8.2)
TSH SERPL DL<=0.05 MIU/L-ACNC: 0.84 MIU/L (ref 0.44–3.9)
UREA NITROGEN: 14 MG/DL (ref 6–23)
WBC: 5.4 X10E9/L (ref 4.4–11.3)

## 2019-05-13 PROCEDURE — 99213 OFFICE O/P EST LOW 20 MIN: CPT | Performed by: INTERNAL MEDICINE

## 2019-05-13 RX ORDER — OMEPRAZOLE 40 MG/1
CAPSULE, DELAYED RELEASE ORAL
Refills: 2 | COMMUNITY
Start: 2019-04-26 | End: 2019-09-19 | Stop reason: SDUPTHER

## 2019-05-13 ASSESSMENT — ENCOUNTER SYMPTOMS
ABDOMINAL PAIN: 0
COUGH: 0
SINUS PRESSURE: 0
SORE THROAT: 0
SINUS PAIN: 0
WHEEZING: 0
RHINORRHEA: 0
DIARRHEA: 0
VOMITING: 0
NAUSEA: 0
SHORTNESS OF BREATH: 0

## 2019-05-13 ASSESSMENT — PATIENT HEALTH QUESTIONNAIRE - PHQ9
SUM OF ALL RESPONSES TO PHQ QUESTIONS 1-9: 0
SUM OF ALL RESPONSES TO PHQ9 QUESTIONS 1 & 2: 0
1. LITTLE INTEREST OR PLEASURE IN DOING THINGS: 0
2. FEELING DOWN, DEPRESSED OR HOPELESS: 0
SUM OF ALL RESPONSES TO PHQ QUESTIONS 1-9: 0

## 2019-05-13 NOTE — PROGRESS NOTES
Subjective:      Patient ID: Cornelius Franco is a 22 y.o. male    Preop evaluation    HPI    Pt presents for preop evaluation. Scheduled for L4-L5, L5 S1 lamineotmy , instrumentaion  posterior interody fusion, posterior lateral fusion on 6/3/19 with Dr. Ashley Barksdale. No chest pain, SOB, palpitations or leg swelling.     Surgery is intermediate risk. METS score: 4-10. No DM, no stroke, no CHF, no CKD or CAD. RCRI score: 0.   Past Medical History:   Diagnosis Date    CAD (coronary artery disease)     murmur    Hypertension      Past Surgical History:   Procedure Laterality Date    EYE SURGERY      laser    UPPER GASTROINTESTINAL ENDOSCOPY N/A 3/25/2019    EGD ESOPHAGOGASTRODUODENOSCOPY WITH DILATION performed by Shon Ayala MD at [address]      x2     Social History     Socioeconomic History    Marital status: Single     Spouse name: Not on file    Number of children: Not on file    Years of education: Not on file    Highest education level: Not on file   Occupational History    Not on file   Social Needs    Financial resource strain: Not on file    Food insecurity:     Worry: Not on file     Inability: Not on file    Transportation needs:     Medical: Not on file     Non-medical: Not on file   Tobacco Use    Smoking status: Current Some Day Smoker     Years: 1.00     Types: Cigars    Smokeless tobacco: Never Used   Substance and Sexual Activity    Alcohol use: Yes     Comment: occassional    Drug use: No    Sexual activity: Yes     Partners: Female   Lifestyle    Physical activity:     Days per week: Not on file     Minutes per session: Not on file    Stress: Not on file   Relationships    Social connections:     Talks on phone: Not on file     Gets together: Not on file     Attends Scientology service: Not on file     Active member of club or organization: Not on file     Attends meetings of clubs or organizations: Not on file Relationship status: Not on file    Intimate partner violence:     Fear of current or ex partner: Not on file     Emotionally abused: Not on file     Physically abused: Not on file     Forced sexual activity: Not on file   Other Topics Concern    Not on file   Social History Narrative    Not on file     History reviewed. No pertinent family history. Allergies:  Patient has no known allergies. Patient Active Problem List   Diagnosis    Development delay    Dyspepsia    Dysphagia     Current Outpatient Medications on File Prior to Visit   Medication Sig Dispense Refill    omeprazole (PRILOSEC) 40 MG delayed release capsule TK 1 C PO QD  2    gabapentin (NEURONTIN) 300 MG capsule Take 300 mg by mouth 3 times daily.  RaNITidine HCl (ZANTAC PO) Take by mouth      ZTLIDO 1.8 % PTCH       pantoprazole (PROTONIX) 20 MG tablet Take 1 tablet by mouth every morning (before breakfast) 30 tablet 1    LYRICA 75 MG capsule TK 1 C PO tid  0    metaxalone (SKELAXIN) 800 MG tablet TK 1 T PO  BID PRN P  0    lisinopril (PRINIVIL;ZESTRIL) 10 MG tablet Take 1 tablet by mouth daily 90 tablet 3    ciprofloxacin (CIPRO) 500 MG tablet TK 1 T PO BID 1 TO 2 H PC . TK WITH PROBIOTICS  0    baclofen (LIORESAL) 10 MG tablet   0    diclofenac (VOLTAREN) 75 MG EC tablet TK 1 T PO BID AFTER MEALS  0    phenazopyridine (PYRIDIUM) 200 MG tablet Take 200 mg by mouth 3 times daily as needed       tiZANidine (ZANAFLEX) 4 MG tablet   1    meloxicam (MOBIC) 15 MG tablet TK 1 T PO QD WF PRF PAIN  0    Blood Pressure KIT 1 kit by Does not apply route daily 1 kit 0     No current facility-administered medications on file prior to visit. Review of Systems   Constitutional: Negative for chills, diaphoresis, fatigue and fever. HENT: Negative for congestion, ear discharge, ear pain, rhinorrhea, sinus pressure, sinus pain, sneezing and sore throat. Respiratory: Negative for cough, shortness of breath and wheezing. Cardiovascular: Negative for chest pain. Gastrointestinal: Negative for abdominal pain, diarrhea, nausea and vomiting. Endocrine: Negative for cold intolerance and heat intolerance. Genitourinary: Negative for dysuria and frequency. Neurological: Negative for dizziness and light-headedness. Objective:   /72   Pulse 83   Temp 98.1 °F (36.7 °C) (Temporal)   Resp 12   Ht 5' 10.5\" (1.791 m)   Wt 199 lb 12.8 oz (90.6 kg)   SpO2 100%   BMI 28.26 kg/m²     Physical Exam   Constitutional: He is oriented to person, place, and time. He appears well-developed and well-nourished. No distress. HENT:   Head: Normocephalic and atraumatic. Cardiovascular: Normal rate, regular rhythm and normal heart sounds. Pulmonary/Chest: Effort normal and breath sounds normal. No stridor. No respiratory distress. Abdominal: Soft. Normal appearance and bowel sounds are normal. There is no hepatosplenomegaly. There is no tenderness. Musculoskeletal: Normal range of motion. Neurological: He is alert and oriented to person, place, and time. Skin: Skin is warm and dry. Psychiatric: He has a normal mood and affect. Assessment:       Diagnosis Orders   1. Pre-op evaluation  CBC Auto Differential    Comprehensive Metabolic Panel    TSH Without Reflex    Protime-Inr    Aptt       Plan:      Orders Placed This Encounter   Procedures    CBC Auto Differential     Standing Status:   Future     Standing Expiration Date:   5/12/2020    Comprehensive Metabolic Panel     Standing Status:   Future     Standing Expiration Date:   5/13/2020    TSH Without Reflex     Standing Status:   Future     Standing Expiration Date:   5/13/2020    Protime-Inr     Standing Status:   Future     Standing Expiration Date:   5/13/2020     Order Specific Question:   Daily Coumadin Dose? Answer:   .  Aptt     Standing Status:   Future     Standing Expiration Date:   5/13/2020     Order Specific Question:   Daily Heparin Dose? Answer:   . Based on RCRI score, perioperative risk is <1%. Patient is low risk for cardiovascular event. There is no medical contraindication to surgery as yet, will await lab test results. I recommend routine DVT prophylaxis prn post-op. Return in about 7 weeks (around 7/3/2019).

## 2019-05-21 ENCOUNTER — TELEPHONE (OUTPATIENT)
Dept: FAMILY MEDICINE CLINIC | Age: 24
End: 2019-05-21

## 2019-05-21 NOTE — TELEPHONE ENCOUNTER
[facility] is requesting for the following documents for the patients June 3rd surgery  recent Lab results, Clearance for Surgery, Office Notes and EKG . Documents to be fax Attn to: Nam Mcclain [phone] and Tel # [phone].

## 2019-09-19 ENCOUNTER — OFFICE VISIT (OUTPATIENT)
Dept: FAMILY MEDICINE CLINIC | Age: 24
End: 2019-09-19
Payer: COMMERCIAL

## 2019-09-19 VITALS
OXYGEN SATURATION: 97 % | HEART RATE: 76 BPM | HEIGHT: 69 IN | SYSTOLIC BLOOD PRESSURE: 120 MMHG | BODY MASS INDEX: 31.4 KG/M2 | RESPIRATION RATE: 12 BRPM | DIASTOLIC BLOOD PRESSURE: 74 MMHG | TEMPERATURE: 98.7 F | WEIGHT: 212 LBS

## 2019-09-19 DIAGNOSIS — I10 HYPERTENSION, UNSPECIFIED TYPE: ICD-10-CM

## 2019-09-19 DIAGNOSIS — G44.201 INTRACTABLE TENSION-TYPE HEADACHE, UNSPECIFIED CHRONICITY PATTERN: Primary | ICD-10-CM

## 2019-09-19 DIAGNOSIS — M51.26 DISC DISPLACEMENT, LUMBAR: ICD-10-CM

## 2019-09-19 DIAGNOSIS — K21.9 GASTROESOPHAGEAL REFLUX DISEASE WITHOUT ESOPHAGITIS: ICD-10-CM

## 2019-09-19 DIAGNOSIS — Z29.9 PREVENTIVE MEASURE: ICD-10-CM

## 2019-09-19 PROCEDURE — 99213 OFFICE O/P EST LOW 20 MIN: CPT | Performed by: INTERNAL MEDICINE

## 2019-09-19 PROCEDURE — 90471 IMMUNIZATION ADMIN: CPT | Performed by: INTERNAL MEDICINE

## 2019-09-19 PROCEDURE — 90472 IMMUNIZATION ADMIN EACH ADD: CPT | Performed by: INTERNAL MEDICINE

## 2019-09-19 PROCEDURE — 90732 PPSV23 VACC 2 YRS+ SUBQ/IM: CPT | Performed by: INTERNAL MEDICINE

## 2019-09-19 PROCEDURE — 90688 IIV4 VACCINE SPLT 0.5 ML IM: CPT | Performed by: INTERNAL MEDICINE

## 2019-09-19 RX ORDER — OMEPRAZOLE 40 MG/1
CAPSULE, DELAYED RELEASE ORAL
Qty: 30 CAPSULE | Refills: 2 | Status: SHIPPED | OUTPATIENT
Start: 2019-09-19 | End: 2020-02-28 | Stop reason: SDUPTHER

## 2019-09-19 RX ORDER — METAXALONE 800 MG/1
TABLET ORAL
Refills: 0 | Status: CANCELLED | OUTPATIENT
Start: 2019-09-19

## 2019-09-19 RX ORDER — LISINOPRIL 10 MG/1
10 TABLET ORAL DAILY
Qty: 90 TABLET | Refills: 3 | Status: SHIPPED | OUTPATIENT
Start: 2019-09-19 | End: 2020-12-07 | Stop reason: ALTCHOICE

## 2019-09-19 RX ORDER — GABAPENTIN 400 MG/1
CAPSULE ORAL
Refills: 1 | COMMUNITY
Start: 2019-09-08 | End: 2019-09-19 | Stop reason: SDUPTHER

## 2019-09-19 RX ORDER — IBUPROFEN 200 MG
200 TABLET ORAL EVERY 6 HOURS PRN
Qty: 120 TABLET | Refills: 1 | Status: SHIPPED | OUTPATIENT
Start: 2019-09-19 | End: 2020-12-07 | Stop reason: ALTCHOICE

## 2019-09-19 RX ORDER — PANTOPRAZOLE SODIUM 20 MG/1
20 TABLET, DELAYED RELEASE ORAL
Qty: 30 TABLET | Refills: 1 | Status: CANCELLED | OUTPATIENT
Start: 2019-09-19

## 2019-09-19 RX ORDER — GABAPENTIN 400 MG/1
CAPSULE ORAL
Qty: 90 CAPSULE | Refills: 3 | Status: SHIPPED | OUTPATIENT
Start: 2019-09-19 | End: 2021-01-07 | Stop reason: ALTCHOICE

## 2019-09-19 RX ORDER — PHENAZOPYRIDINE HYDROCHLORIDE 200 MG/1
200 TABLET, FILM COATED ORAL 3 TIMES DAILY PRN
Qty: 30 TABLET | Refills: 1 | Status: SHIPPED | OUTPATIENT
Start: 2019-09-19 | End: 2020-02-28

## 2019-09-19 NOTE — PROGRESS NOTES
Physical Exam   Constitutional: He is oriented to person, place, and time. He appears well-developed and well-nourished. No distress. Cardiovascular: Normal rate, regular rhythm and normal heart sounds. Pulmonary/Chest: Effort normal and breath sounds normal. No respiratory distress. Abdominal: Soft. Normal appearance and bowel sounds are normal. There is no hepatosplenomegaly. There is no tenderness. Neurological: He is alert and oriented to person, place, and time. Psychiatric: His behavior is normal. Thought content normal. His mood appears not anxious. His speech is not rapid and/or pressured, not delayed, not tangential and not slurred. He is not agitated, not aggressive, not hyperactive, not withdrawn, not actively hallucinating and not combative. Thought content is not paranoid and not delusional. Cognition and memory are not impaired. He does not express impulsivity or inappropriate judgment. He does not exhibit a depressed mood. He expresses no homicidal and no suicidal ideation. He expresses no suicidal plans and no homicidal plans. Assessment:       Diagnosis Orders   1. Intractable tension-type headache, unspecified chronicity pattern  ibuprofen (ADVIL;MOTRIN) 200 MG tablet   2. Hypertension, unspecified type  lisinopril (PRINIVIL;ZESTRIL) 10 MG tablet   3. Gastroesophageal reflux disease without esophagitis  omeprazole (PRILOSEC) 40 MG delayed release capsule   4. Disc displacement, lumbar      s/p L4-L5, L5 S1 laminectomy, instrumentaion  posterior interody fusion, posterior lateral fusion on 6/3/19   5. Preventive measure  gabapentin (NEURONTIN) 400 MG capsule    phenazopyridine (PYRIDIUM) 200 MG tablet    INFLUENZA, QUADV, 3 YRS AND OLDER, IM, MDV, 0.5ML (AFLURIA QUADV)    Pneumococcal polysaccharide vaccine 23-valent greater than or equal to 1yo subcutaneous/IM    HIV-1,2 Combo Ag/Ab By CHRISTINA, Reflexive Panel       Plan:   Med refills provided.   Return in about 1 month (around

## 2019-09-20 ASSESSMENT — ENCOUNTER SYMPTOMS
RHINORRHEA: 0
ABDOMINAL PAIN: 0
DIARRHEA: 0
SHORTNESS OF BREATH: 0
SINUS PAIN: 0
NAUSEA: 0
SORE THROAT: 0
SINUS PRESSURE: 0
VOMITING: 0
COUGH: 0
WHEEZING: 0
BACK PAIN: 1

## 2020-02-26 ENCOUNTER — NURSE TRIAGE (OUTPATIENT)
Dept: OTHER | Facility: CLINIC | Age: 25
End: 2020-02-26

## 2020-02-26 NOTE — TELEPHONE ENCOUNTER
Received call from [address] [place]       Lingering headache x 1 week  Taking ibuprofen-\" taking 5 a day, dulls the pain\"  Rates 7/10  Trying to stay hydrated, includes caffeine  No n/v/d/  Has hot flashes  Denies sore throat   States has \"back problems and neck is always stiff\" (not new)    Call soft transferred to [address] to schedule appointment.       Reason for Disposition   Patient wants to be seen    Protocols used: HEADACHE-ADULT-[place]

## 2020-02-28 ENCOUNTER — OFFICE VISIT (OUTPATIENT)
Dept: FAMILY MEDICINE CLINIC | Age: 25
End: 2020-02-28
Payer: COMMERCIAL

## 2020-02-28 VITALS
HEART RATE: 87 BPM | RESPIRATION RATE: 12 BRPM | DIASTOLIC BLOOD PRESSURE: 80 MMHG | WEIGHT: 219 LBS | HEIGHT: 69 IN | SYSTOLIC BLOOD PRESSURE: 110 MMHG | BODY MASS INDEX: 32.44 KG/M2 | OXYGEN SATURATION: 98 % | TEMPERATURE: 98 F

## 2020-02-28 PROCEDURE — 99213 OFFICE O/P EST LOW 20 MIN: CPT | Performed by: INTERNAL MEDICINE

## 2020-02-28 PROCEDURE — 90471 IMMUNIZATION ADMIN: CPT | Performed by: INTERNAL MEDICINE

## 2020-02-28 PROCEDURE — 90715 TDAP VACCINE 7 YRS/> IM: CPT | Performed by: INTERNAL MEDICINE

## 2020-02-28 RX ORDER — OMEPRAZOLE 40 MG/1
CAPSULE, DELAYED RELEASE ORAL
Qty: 60 CAPSULE | Refills: 3 | Status: SHIPPED | OUTPATIENT
Start: 2020-02-28 | End: 2020-10-30

## 2020-02-28 RX ORDER — ACETAMINOPHEN 500 MG
1000 TABLET ORAL EVERY 6 HOURS PRN
Qty: 120 TABLET | Refills: 3 | Status: SHIPPED | OUTPATIENT
Start: 2020-02-28 | End: 2020-12-07 | Stop reason: ALTCHOICE

## 2020-02-28 ASSESSMENT — PATIENT HEALTH QUESTIONNAIRE - PHQ9
SUM OF ALL RESPONSES TO PHQ QUESTIONS 1-9: 0
2. FEELING DOWN, DEPRESSED OR HOPELESS: 0
SUM OF ALL RESPONSES TO PHQ9 QUESTIONS 1 & 2: 0
1. LITTLE INTEREST OR PLEASURE IN DOING THINGS: 0
SUM OF ALL RESPONSES TO PHQ QUESTIONS 1-9: 0

## 2020-02-28 NOTE — PROGRESS NOTES
Subjective:      Patient ID: Pranay Campa is a 26 y.o. male  Headache x 1 week  HPI  Pt presents with 1 week of pressure-like headache, on both temples. Pain is rated 2-6 /10, non-radiating. Feels stressed out and just moved residence. No light or sound sensitivity. No facial tingling or numbness, no flashes of light, no known head trauma. Pain is improving with relaxation and ibuprofen use. No sinus congestion, no head trauma, no dizziness. Requests refill of PPI for GERD.    Past Medical History:   Diagnosis Date    CAD (coronary artery disease)     murmur    Hypertension      Past Surgical History:   Procedure Laterality Date    EYE SURGERY      laser    UPPER GASTROINTESTINAL ENDOSCOPY N/A 3/25/2019    EGD ESOPHAGOGASTRODUODENOSCOPY WITH DILATION performed by Kerry Montanez MD at [address]      x2     Social History     Socioeconomic History    Marital status: Single     Spouse name: Not on file    Number of children: Not on file    Years of education: Not on file    Highest education level: Not on file   Occupational History    Not on file   Social Needs    Financial resource strain: Not on file    Food insecurity:     Worry: Not on file     Inability: Not on file    Transportation needs:     Medical: Not on file     Non-medical: Not on file   Tobacco Use    Smoking status: Current Some Day Smoker     Years: 1.00     Types: Cigars    Smokeless tobacco: Never Used   Substance and Sexual Activity    Alcohol use: Yes     Comment: occassional    Drug use: No    Sexual activity: Yes     Partners: Female   Lifestyle    Physical activity:     Days per week: Not on file     Minutes per session: Not on file    Stress: Not on file   Relationships    Social connections:     Talks on phone: Not on file     Gets together: Not on file     Attends Evangelical service: Not on file     Active member of club or organization: Not on file     Attends meetings of clubs or organizations: Not on file     Relationship status: Not on file    Intimate partner violence:     Fear of current or ex partner: Not on file     Emotionally abused: Not on file     Physically abused: Not on file     Forced sexual activity: Not on file   Other Topics Concern    Not on file   Social History Narrative    Not on file     History reviewed. No pertinent family history. Allergies:  Patient has no known allergies. Patient Active Problem List   Diagnosis    Development delay    Dyspepsia    Dysphagia     Current Outpatient Medications on File Prior to Visit   Medication Sig Dispense Refill    lisinopril (PRINIVIL;ZESTRIL) 10 MG tablet Take 1 tablet by mouth daily 90 tablet 3    gabapentin (NEURONTIN) 400 MG capsule Take 1 po TID prn 90 capsule 3    ibuprofen (ADVIL;MOTRIN) 200 MG tablet Take 1 tablet by mouth every 6 hours as needed for Pain 120 tablet 1    meloxicam (MOBIC) 15 MG tablet TK 1 T PO QD WF PRF PAIN  0    Blood Pressure KIT 1 kit by Does not apply route daily 1 kit 0    ZTLIDO 1.8 % PTCH       tiZANidine (ZANAFLEX) 4 MG tablet   1     No current facility-administered medications on file prior to visit. Review of Systems   Constitutional: Negative for chills, diaphoresis, fatigue and fever. HENT: Negative for congestion, ear discharge, ear pain, rhinorrhea, sinus pressure and sinus pain. Respiratory: Negative for cough, shortness of breath and wheezing. Cardiovascular: Negative for chest pain. Gastrointestinal: Negative for abdominal pain, diarrhea, nausea and vomiting. Endocrine: Negative for cold intolerance and heat intolerance. Genitourinary: Negative for dysuria and frequency. Neurological: Positive for headaches. Negative for dizziness and light-headedness.      Objective:   /80   Pulse 87   Temp 98 °F (36.7 °C) (Temporal)   Resp 12   Ht 5' 9\" (1.753 m)   Wt 219 lb (99.3 kg)   SpO2 98%   BMI 32.34 kg/m²     Physical Exam  Constitutional:       General: He is not in acute distress. Appearance: Normal appearance. He is well-developed. HENT:      Head: Normocephalic and atraumatic. Comments: No TTP  Cardiovascular:      Rate and Rhythm: Normal rate and regular rhythm. Pulses: Normal pulses. Heart sounds: Normal heart sounds. Pulmonary:      Effort: Pulmonary effort is normal. No respiratory distress. Breath sounds: Normal breath sounds. Abdominal:      General: Bowel sounds are normal. There is no distension. Palpations: Abdomen is soft. Tenderness: There is no abdominal tenderness. There is no guarding. Neurological:      General: No focal deficit present. Mental Status: He is alert. He is disoriented. Cranial Nerves: No cranial nerve deficit. Assessment:       Diagnosis Orders   1. Intractable tension-type headache, unspecified chronicity pattern Improving acetaminophen (APAP EXTRA STRENGTH) 500 MG tablet   2. Gastroesophageal reflux disease without esophagitis  omeprazole (PRILOSEC) 40 MG delayed release capsule   3. Preventative health care  Tdap (age 6y and older) IM (BOOSTRIX)    HIV-1,2 Combo Ag/Ab By CHRISTINA, Reflexive Panel     Plan:   OTC APAP 2 tabs q 6 prn. Return in about 6 months (around 8/28/2020), or if symptoms worsen or fail to improve, for review of test results, follow up.

## 2020-03-01 ASSESSMENT — ENCOUNTER SYMPTOMS
COUGH: 0
VOMITING: 0
SINUS PAIN: 0
WHEEZING: 0
NAUSEA: 0
ABDOMINAL PAIN: 0
SHORTNESS OF BREATH: 0
SINUS PRESSURE: 0
DIARRHEA: 0
RHINORRHEA: 0

## 2020-04-08 ENCOUNTER — VIRTUAL VISIT (OUTPATIENT)
Dept: FAMILY MEDICINE CLINIC | Age: 25
End: 2020-04-08
Payer: COMMERCIAL

## 2020-04-08 PROCEDURE — 99213 OFFICE O/P EST LOW 20 MIN: CPT | Performed by: INTERNAL MEDICINE

## 2020-04-08 RX ORDER — CETIRIZINE HYDROCHLORIDE 10 MG/1
10 TABLET ORAL DAILY
Qty: 30 TABLET | Refills: 3 | Status: SHIPPED | OUTPATIENT
Start: 2020-04-08 | End: 2020-05-08

## 2020-04-08 RX ORDER — FLUTICASONE PROPIONATE 50 MCG
2 SPRAY, SUSPENSION (ML) NASAL DAILY
Qty: 1 BOTTLE | Refills: 3 | Status: SHIPPED | OUTPATIENT
Start: 2020-04-08 | End: 2020-12-07 | Stop reason: ALTCHOICE

## 2020-04-08 ASSESSMENT — ENCOUNTER SYMPTOMS
WHEEZING: 0
SINUS PAIN: 0
SHORTNESS OF BREATH: 0
DIARRHEA: 0
COUGH: 0
NAUSEA: 0
ABDOMINAL PAIN: 0
SORE THROAT: 0
SINUS PRESSURE: 1
RHINORRHEA: 0
VOMITING: 0

## 2020-04-08 NOTE — PROGRESS NOTES
([place]bi) 4 MG tablet   Historical Provider, MD   meloxicam (MOBIC) 15 MG tablet TK 1 T PO QD WF PRF PAIN  Historical Provider, MD   Blood Pressure KIT 1 kit by Does not apply route daily  Eulalio Fuller MD       Social History     Tobacco Use    Smoking status: Current Some Day Smoker     Years: 1.00     Types: Cigars    Smokeless tobacco: Never Used   Substance Use Topics    Alcohol use: Yes     Comment: occassional    Drug use: No      No Known Allergies,   Past Medical History:   Diagnosis Date    CAD (coronary artery disease)     murmur    Hypertension    ,   Past Surgical History:   Procedure Laterality Date    EYE SURGERY      laser    UPPER GASTROINTESTINAL ENDOSCOPY N/A 3/25/2019    EGD ESOPHAGOGASTRODUODENOSCOPY WITH DILATION performed by Wan Huynh MD at [address]      x2   ,   Social History     Tobacco Use    Smoking status: Current Some Day Smoker     Years: 1.00     Types: Cigars    Smokeless tobacco: Never Used   Substance Use Topics    Alcohol use: Yes     Comment: occassional    Drug use: No       PHYSICAL EXAMINATION:  [ INSTRUCTIONS:  \"[x]\" Indicates a positive item  \"[]\" Indicates a negative item  -- DELETE ALL ITEMS NOT EXAMINED]  Vital Signs: (As obtained by patient/caregiver or practitioner observation)    Blood pressure-  Heart rate-    Respiratory rate-    Temperature-  Pulse oximetry-     Constitutional: [x] Appears well-developed and well-nourished [x] No apparent distress      [] Abnormal-   Mental status  [x] Alert and awake  [x] Oriented to person/place/time []Able to follow commands      Eyes:  EOM    []  Normal  [] Abnormal-  Sclera  []  Normal  [] Abnormal -         Discharge []  None visible  [] Abnormal -    HENT:   [x] Normocephalic, atraumatic.   [] Abnormal   [] Mouth/Throat: Mucous membranes are moist.     External Ears [] Normal  [] Abnormal-     Neck: [] No visualized mass     Pulmonary/Chest: [x] Respiratory effort normal.  [x] No visualized signs of difficulty breathing or respiratory distress        [] Abnormal-      Musculoskeletal:   [] Normal gait with no signs of ataxia         [] Normal range of motion of neck        [] Abnormal-       Neurological:        [] No Facial Asymmetry (Cranial nerve 7 motor function) (limited exam to video visit)          [] No gaze palsy        [] Abnormal-         Skin:        [] No significant exanthematous lesions or discoloration noted on facial skin         [] Abnormal-            Psychiatric:       [x] Normal Affect [] No Hallucinations        [] Abnormal-   Other pertinent observable physical exam findings-     ASSESSMENT/PLAN:  1. Allergic sinusitis  - cetirizine (ZYRTEC) 10 MG tablet; Take 1 tablet by mouth daily  Dispense: 30 tablet; Refill: 3  - fluticasone (FLONASE) 50 MCG/ACT nasal spray; 2 sprays by Nasal route daily  Dispense: 1 Bottle; Refill: 3  2. Intractable tension-type headache, unspecified chronicity pattern  -likely related allergic sinus congestion  Return in about 1 month (around 5/8/2020) for assessment of response to treatment. Olga Gómez is a 26 y.o. male being evaluated by a Virtual Visit (video visit) encounter to address concerns as mentioned above. A caregiver was present when appropriate. Due to this being a TeleHealth encounter (During [facility]- public health emergency), evaluation of the following organ systems was limited: Vitals/Constitutional/EENT/Resp/CV/GI//MS/Neuro/Skin/Heme-Lymph-Imm. Pursuant to the emergency declaration under the [address] [place] [address] authority and the [employer] and [employer] Act, this Virtual Visit was conducted with patient's (and/or legal guardian's) consent, to reduce the patient's risk of exposure to COVID-19 and provide necessary medical care.   The patient (and/or legal guardian) has also been advised to contact this office for worsening conditions or problems, and seek emergency medical treatment and/or call 911 if deemed necessary. Services were provided through a video synchronous discussion virtually to substitute for in-person clinic visit. Patient and provider were located at their individual homes. --Howard Ashley MD on 4/8/2020 at 12:13 PM    An electronic signature was used to authenticate this note.

## 2020-04-22 ENCOUNTER — VIRTUAL VISIT (OUTPATIENT)
Dept: FAMILY MEDICINE CLINIC | Age: 25
End: 2020-04-22
Payer: COMMERCIAL

## 2020-04-22 PROCEDURE — 99442 PR PHYS/QHP TELEPHONE EVALUATION 11-20 MIN: CPT | Performed by: INTERNAL MEDICINE

## 2020-04-22 RX ORDER — SUMATRIPTAN 100 MG/1
100 TABLET, FILM COATED ORAL
Qty: 9 TABLET | Refills: 3 | Status: SHIPPED | OUTPATIENT
Start: 2020-04-22 | End: 2021-01-07 | Stop reason: ALTCHOICE

## 2020-04-22 RX ORDER — PROPRANOLOL HYDROCHLORIDE 80 MG/1
80 CAPSULE, EXTENDED RELEASE ORAL DAILY
Qty: 30 CAPSULE | Refills: 3 | Status: SHIPPED | OUTPATIENT
Start: 2020-04-22 | End: 2020-04-30 | Stop reason: DRUGHIGH

## 2020-04-30 ENCOUNTER — VIRTUAL VISIT (OUTPATIENT)
Dept: FAMILY MEDICINE CLINIC | Age: 25
End: 2020-04-30
Payer: COMMERCIAL

## 2020-04-30 PROCEDURE — 99214 OFFICE O/P EST MOD 30 MIN: CPT | Performed by: INTERNAL MEDICINE

## 2020-04-30 RX ORDER — PROPRANOLOL HYDROCHLORIDE 120 MG/1
120 CAPSULE, EXTENDED RELEASE ORAL DAILY
Qty: 30 CAPSULE | Refills: 3 | Status: SHIPPED | OUTPATIENT
Start: 2020-04-30 | End: 2020-09-24

## 2020-04-30 ASSESSMENT — ENCOUNTER SYMPTOMS
SHORTNESS OF BREATH: 0
WHEEZING: 0
SORE THROAT: 0
SINUS PAIN: 0
DIARRHEA: 0
COUGH: 0
SINUS PRESSURE: 0
VOMITING: 0
NAUSEA: 0
ABDOMINAL PAIN: 0
RHINORRHEA: 0

## 2020-04-30 NOTE — PROGRESS NOTES
2020    TELEHEALTH EVALUATION -- Audio/Visual (During XPGEW-70 public health emergency)    HPI:    Paula Johnston (:  1995) has requested an audio/video evaluation for the following concern(s):    Follow up for chronic headache. Placed on Inderal 80mg daily. Headache persists on the left temple, sharp,  throbbing, rated 4-5/10, nonradiating, not lasting the whole day, but still occuring daily. Still with nausea but no more vomiting. Still takes caffeinated drinks. Review of Systems   Constitutional: Negative for chills, diaphoresis, fatigue and fever. HENT: Negative for congestion, ear discharge, ear pain, rhinorrhea, sinus pressure, sinus pain, sneezing and sore throat. Respiratory: Negative for cough, shortness of breath and wheezing. Cardiovascular: Negative for chest pain. Gastrointestinal: Negative for abdominal pain, diarrhea, nausea and vomiting. Endocrine: Negative for cold intolerance and heat intolerance. Genitourinary: Negative for dysuria and frequency. Neurological: Positive for headaches. Negative for dizziness, speech difficulty, light-headedness and numbness. Prior to Visit Medications    Medication Sig Taking? Authorizing Provider   propranolol (INDERAL LA) 120 MG extended release capsule Take 1 capsule by mouth daily Yes Sasha Morales MD   SUMAtriptan (IMITREX) 100 MG tablet Take 1 tablet by mouth once as needed for Migraine May repeat dose x 1 after 2 hours. No more than 2 tablets per 24 hours.   Sasha Morales MD   cetirizine (ZYRTEC) 10 MG tablet Take 1 tablet by mouth daily  Sasha Morales MD   fluticasone [facility]) 50 MCG/ACT nasal spray 2 sprays by Nasal route daily  Sasha Morales MD   omeprazole (PRILOSEC) 40 MG delayed release capsule TK 1 C PO QD  Sasha Morales MD   acetaminophen (APAP EXTRA STRENGTH) 500 MG tablet Take 2 tablets by mouth every 6 hours as needed for Pain  Sasha Morales MD   lisinopril (PRINIVIL;ZESTRIL) 10 MG tablet Take 1 tablet by mouth daily  Sasha Morales the patient's risk of exposure to COVID-19 and provide necessary medical care. The patient (and/or legal guardian) has also been advised to contact this office for worsening conditions or problems, and seek emergency medical treatment and/or call 911 if deemed necessary. Patient identification was verified at the start of the visit: Yes    Total time spent on this encounter: Not billed by time    Services were provided through a video synchronous discussion virtually to substitute for in-person clinic visit. Patient and provider were located at their individual homes. --Ene Odom MD on 4/30/2020 at 11:56 AM    An electronic signature was used to authenticate this note.

## 2020-06-03 LAB
GLOMERULAR FILTRATION RATE, POC: >60
POC CREATININE: 0.9 MG/DL (ref 0.6–1.3)

## 2020-10-07 ENCOUNTER — OFFICE VISIT (OUTPATIENT)
Dept: PRIMARY CARE CLINIC | Age: 25
End: 2020-10-07
Payer: COMMERCIAL

## 2020-10-07 VITALS
HEIGHT: 69 IN | OXYGEN SATURATION: 98 % | WEIGHT: 236.4 LBS | RESPIRATION RATE: 16 BRPM | SYSTOLIC BLOOD PRESSURE: 116 MMHG | TEMPERATURE: 97.6 F | BODY MASS INDEX: 35.01 KG/M2 | HEART RATE: 65 BPM | DIASTOLIC BLOOD PRESSURE: 80 MMHG

## 2020-10-07 PROCEDURE — 99214 OFFICE O/P EST MOD 30 MIN: CPT | Performed by: INTERNAL MEDICINE

## 2020-10-07 RX ORDER — CETIRIZINE HYDROCHLORIDE 10 MG/1
10 TABLET ORAL DAILY
Qty: 60 TABLET | Refills: 0 | Status: SHIPPED | OUTPATIENT
Start: 2020-10-07 | End: 2020-11-06

## 2020-10-07 RX ORDER — MONTELUKAST SODIUM 10 MG/1
10 TABLET ORAL NIGHTLY PRN
Qty: 30 TABLET | Refills: 1 | Status: SHIPPED | OUTPATIENT
Start: 2020-10-07 | End: 2021-08-19

## 2020-10-07 ASSESSMENT — PATIENT HEALTH QUESTIONNAIRE - PHQ9
SUM OF ALL RESPONSES TO PHQ QUESTIONS 1-9: 0
2. FEELING DOWN, DEPRESSED OR HOPELESS: 0
1. LITTLE INTEREST OR PLEASURE IN DOING THINGS: 0
SUM OF ALL RESPONSES TO PHQ9 QUESTIONS 1 & 2: 0
SUM OF ALL RESPONSES TO PHQ QUESTIONS 1-9: 0

## 2020-10-07 NOTE — PROGRESS NOTES
Subjective:      Patient ID: María Tomas is a 26 y.o. male     Ear fullness  HPI  Pt presents with 3 months of fullness and drainage from both ears. No ear pain , no fever or chills. Assoc runnynose and sinus congestion, no cough, SOB or chest pain, no wheezing. Assoc headache, gradually improving. Brain CT neg.     Past Medical History:   Diagnosis Date    CAD (coronary artery disease)     murmur    Hypertension      Past Surgical History:   Procedure Laterality Date    EYE SURGERY      laser    UPPER GASTROINTESTINAL ENDOSCOPY N/A 3/25/2019    EGD ESOPHAGOGASTRODUODENOSCOPY WITH DILATION performed by Serjio Land MD at [address]      x2     Social History     Socioeconomic History    Marital status: Single     Spouse name: Not on file    Number of children: Not on file    Years of education: Not on file    Highest education level: Not on file   Occupational History    Not on file   Social Needs    Financial resource strain: Not on file    Food insecurity     Worry: Not on file     Inability: Not on file    Transportation needs     Medical: Not on file     Non-medical: Not on file   Tobacco Use    Smoking status: Current Some Day Smoker     Years: 1.00     Types: Cigars    Smokeless tobacco: Never Used   Substance and Sexual Activity    Alcohol use: Yes     Comment: occassional    Drug use: No    Sexual activity: Yes     Partners: Female   Lifestyle    Physical activity     Days per week: Not on file     Minutes per session: Not on file    Stress: Not on file   Relationships    Social connections     Talks on phone: Not on file     Gets together: Not on file     Attends [employer] service: Not on file     Active member of club or organization: Not on file     Attends meetings of clubs or organizations: Not on file     Relationship status: Not on file    Intimate partner violence     Fear of current or ex partner: Not on file     Emotionally abused: Not on file     Physically abused: Not on file     Forced sexual activity: Not on file   Other Topics Concern    Not on file   Social History Narrative    Not on file     History reviewed. No pertinent family history. Allergies:  Patient has no known allergies. Patient Active Problem List   Diagnosis    Development delay    Dyspepsia    Dysphagia     Current Outpatient Medications on File Prior to Visit   Medication Sig Dispense Refill    propranolol (INDERAL LA) 120 MG extended release capsule TAKE 1 CAPSULE BY MOUTH DAILY 90 capsule 3    omeprazole (PRILOSEC) 40 MG delayed release capsule TK 1 C PO QD 60 capsule 3    acetaminophen (APAP EXTRA STRENGTH) 500 MG tablet Take 2 tablets by mouth every 6 hours as needed for Pain 120 tablet 3    ibuprofen (ADVIL;MOTRIN) 200 MG tablet Take 1 tablet by mouth every 6 hours as needed for Pain 120 tablet 1    SUMAtriptan (IMITREX) 100 MG tablet Take 1 tablet by mouth once as needed for Migraine May repeat dose x 1 after 2 hours. No more than 2 tablets per 24 hours. 9 tablet 3    fluticasone (FLONASE) 50 MCG/ACT nasal spray 2 sprays by Nasal route daily (Patient not taking: Reported on 10/7/2020) 1 Bottle 3    lisinopril (PRINIVIL;ZESTRIL) 10 MG tablet Take 1 tablet by mouth daily (Patient not taking: Reported on 10/7/2020) 90 tablet 3    gabapentin (NEURONTIN) 400 MG capsule Take 1 po TID prn 90 capsule 3    ZTLIDO 1.8 % PTCH       tiZANidine (ZANAFLEX) 4 MG tablet   1    meloxicam (MOBIC) 15 MG tablet TK 1 T PO QD WF PRF PAIN  0    Blood Pressure KIT 1 kit by Does not apply route daily (Patient not taking: Reported on 10/7/2020) 1 kit 0     No current facility-administered medications on file prior to visit. Review of Systems   Constitutional: Negative for chills, diaphoresis, fatigue and fever. HENT: Positive for ear discharge. Negative for congestion, ear pain, rhinorrhea, sinus pressure, sinus pain, sneezing and sore throat. Respiratory: Negative for cough, shortness of breath and wheezing. Cardiovascular: Negative for chest pain. Gastrointestinal: Negative for abdominal pain, diarrhea, nausea and vomiting. Endocrine: Negative for cold intolerance and heat intolerance. Genitourinary: Negative for dysuria and frequency. Neurological: Negative for dizziness and light-headedness. Objective:   /80 (Site: Right Upper Arm, Position: Sitting, Cuff Size: Large Adult)   Pulse 65   Temp 97.6 °F (36.4 °C) (Temporal)   Resp 16   Ht 5' 9\" (1.753 m)   Wt 236 lb 6.4 oz (107.2 kg)   SpO2 98%   BMI 34.91 kg/m²     Physical Exam  Constitutional:       General: He is not in acute distress. Appearance: Normal appearance. He is well-developed. HENT:      Head:      Comments: No TM or pharyngeal erythema  No sinus TTP  Cardiovascular:      Rate and Rhythm: Normal rate and regular rhythm. Pulses: Normal pulses. Heart sounds: Normal heart sounds. No murmur. Pulmonary:      Effort: Pulmonary effort is normal. No respiratory distress. Breath sounds: Normal breath sounds. No wheezing. Abdominal:      General: Bowel sounds are normal. There is no distension. Palpations: Abdomen is soft. There is no mass. Tenderness: There is no abdominal tenderness. There is no guarding or rebound. Neurological:      Mental Status: He is alert. Assessment:       Diagnosis Orders   1. Seasonal allergic rhinitis, unspecified trigger  montelukast (SINGULAIR) 10 MG tablet    cetirizine (ZYRTEC) 10 MG tablet   2.  Preventative health care  CBC With Auto Differential    Comprehensive Metabolic Panel    CHN-0,5 Combo Ag/Ab By CHRISTINA, Reflexive Panel       Plan:      Orders Placed This Encounter   Procedures    CBC With Auto Differential     Standing Status:   Future     Standing Expiration Date:   10/7/2021    Comprehensive Metabolic Panel     Standing Status:   Future     Standing Expiration Date:   10/7/2021   [address] [place] [address] HIV-1,2 Combo Ag/Ab By CHRISTINA, Reflexive Panel     Standing Status:   Future     Standing Expiration Date:   10/7/2021     Orders Placed This Encounter   Medications    montelukast (SINGULAIR) 10 MG tablet     Sig: Take 1 tablet by mouth nightly as needed (runnynose)     Dispense:  30 tablet     Refill:  1    cetirizine (ZYRTEC) 10 MG tablet     Sig: Take 1 tablet by mouth daily     Dispense:  60 tablet     Refill:  0     Return if symptoms worsen or fail to improve.

## 2020-10-08 ASSESSMENT — ENCOUNTER SYMPTOMS
NAUSEA: 0
DIARRHEA: 0
SINUS PAIN: 0
SHORTNESS OF BREATH: 0
ABDOMINAL PAIN: 0
WHEEZING: 0
COUGH: 0
RHINORRHEA: 0
SINUS PRESSURE: 0
VOMITING: 0
SORE THROAT: 0

## 2020-10-30 NOTE — TELEPHONE ENCOUNTER
Pharmacy requesting medication refill. Please approve or deny this request.    Rx requested:  Requested Prescriptions     Pending Prescriptions Disp Refills    omeprazole (PRILOSEC) 40 MG delayed release capsule [Pharmacy Med Name: OMEPRAZOLE 40MG CAPSULES] 60 capsule 3     Sig: TAKE 1 CAPSULE BY MOUTH EVERY DAY         Last Office Visit:   10/7/2020      Next Visit Date:  No future appointments.

## 2020-10-31 RX ORDER — OMEPRAZOLE 40 MG/1
CAPSULE, DELAYED RELEASE ORAL
Qty: 60 CAPSULE | Refills: 3 | Status: SHIPPED | OUTPATIENT
Start: 2020-10-31 | End: 2020-12-07 | Stop reason: SDUPTHER

## 2020-12-07 ENCOUNTER — OFFICE VISIT (OUTPATIENT)
Dept: PRIMARY CARE CLINIC | Age: 25
End: 2020-12-07
Payer: COMMERCIAL

## 2020-12-07 VITALS
HEART RATE: 63 BPM | OXYGEN SATURATION: 99 % | RESPIRATION RATE: 18 BRPM | SYSTOLIC BLOOD PRESSURE: 128 MMHG | DIASTOLIC BLOOD PRESSURE: 90 MMHG | WEIGHT: 241.2 LBS | BODY MASS INDEX: 35.73 KG/M2 | HEIGHT: 69 IN

## 2020-12-07 LAB
ALBUMIN: 4.7 G/DL (ref 3.4–5)
ALP BLD-CCNC: 107 U/L (ref 33–120)
ALT SERPL-CCNC: 29 U/L (ref 10–52)
ANION GAP SERPL CALCULATED.3IONS-SCNC: 10 MMOL/L (ref 10–20)
AST SERPL-CCNC: 27 U/L (ref 9–39)
BASOPHILS # BLD: 0.04 X10E9/L (ref 0–0.1)
BASOPHILS RELATIVE PERCENT: 0.5 % (ref 0–2)
BICARBONATE: 28 MMOL/L (ref 21–32)
BILIRUB SERPL-MCNC: 0.4 MG/DL (ref 0–1.2)
CALCIUM SERPL-MCNC: 9.2 MG/DL (ref 8.6–10.3)
CHLORIDE BLD-SCNC: 104 MMOL/L (ref 98–107)
CREAT SERPL-MCNC: 0.94 MG/DL (ref 0.5–1.3)
EOSINOPHIL # BLD: 0.16 X10E9/L (ref 0–0.7)
EOSINOPHILS RELATIVE PERCENT: 2 % (ref 0–6)
ERYTHROCYTE [DISTWIDTH] IN BLOOD BY AUTOMATED COUNT: 12.3 % (ref 11.5–14)
GFR AFRICAN AMERICAN: >60 ML/MIN/1.73M2
GFR NON-AFRICAN AMERICAN: >60 ML/MIN/1.73M2
GLUCOSE: 99 MG/DL (ref 74–99)
HCT VFR BLD CALC: 44.6 % (ref 41–52)
HEMOGLOBIN: 15.2 G/DL (ref 13.5–17)
IMMATURE GRANULOCYTES %: 0.2 % (ref 0–0.9)
LYMPHOCYTES # BLD: 24.6 % (ref 13–44)
LYMPHOCYTES RELATIVE PERCENT: 1.98 X10E9/L (ref 1.2–4.8)
MCHC RBC AUTO-ENTMCNC: 34.1 G/DL (ref 32–36)
MCV RBC AUTO: 88 FL (ref 80–100)
MONOCYTES # BLD: 0.72 X10E9/L (ref 0.1–1)
MONOCYTES RELATIVE PERCENT: 8.9 % (ref 2–10)
NEUTROPHILS RELATIVE PERCENT: 63.8 % (ref 40–80)
NEUTROPHILS: 5.13 X10E9/L (ref 1.2–7.7)
PLATELET # BLD: 267 X10E9/L (ref 150–450)
POTASSIUM SERPL-SCNC: 3.7 MMOL/L (ref 3.5–5.3)
RBC # BLD: 5.06 X10E12/L (ref 4.5–5.9)
SODIUM BLD-SCNC: 138 MMOL/L (ref 136–145)
TOTAL PROTEIN: 7.4 G/DL (ref 6.4–8.2)
UREA NITROGEN: 13 MG/DL (ref 6–23)
WBC: 8.1 X10E9/L (ref 4.4–11.3)

## 2020-12-07 PROCEDURE — 99214 OFFICE O/P EST MOD 30 MIN: CPT | Performed by: INTERNAL MEDICINE

## 2020-12-07 RX ORDER — OMEPRAZOLE 40 MG/1
CAPSULE, DELAYED RELEASE ORAL
Qty: 60 CAPSULE | Refills: 3 | Status: SHIPPED | OUTPATIENT
Start: 2020-12-07 | End: 2021-08-05 | Stop reason: SDUPTHER

## 2020-12-07 RX ORDER — KETOCONAZOLE 20 MG/G
CREAM TOPICAL
Qty: 1 TUBE | Refills: 2 | Status: SHIPPED | OUTPATIENT
Start: 2020-12-07 | End: 2021-02-19 | Stop reason: SDUPTHER

## 2020-12-07 RX ORDER — LISINOPRIL 10 MG/1
10 TABLET ORAL DAILY
Qty: 90 TABLET | Refills: 3 | Status: SHIPPED | OUTPATIENT
Start: 2020-12-07 | End: 2021-12-22 | Stop reason: SDUPTHER

## 2020-12-07 ASSESSMENT — PATIENT HEALTH QUESTIONNAIRE - PHQ9
SUM OF ALL RESPONSES TO PHQ QUESTIONS 1-9: 0
2. FEELING DOWN, DEPRESSED OR HOPELESS: 0
1. LITTLE INTEREST OR PLEASURE IN DOING THINGS: 0
SUM OF ALL RESPONSES TO PHQ9 QUESTIONS 1 & 2: 0

## 2020-12-07 NOTE — PROGRESS NOTES
abused: Not on file     Physically abused: Not on file     Forced sexual activity: Not on file   Other Topics Concern    Not on file   Social History Narrative    Not on file     History reviewed. No pertinent family history. Allergies:  Patient has no known allergies. Patient Active Problem List   Diagnosis    Development delay    Dyspepsia    Dysphagia     Current Outpatient Medications on File Prior to Visit   Medication Sig Dispense Refill    montelukast (SINGULAIR) 10 MG tablet Take 1 tablet by mouth nightly as needed (runnynose) 30 tablet 1    propranolol (INDERAL LA) 120 MG extended release capsule TAKE 1 CAPSULE BY MOUTH DAILY 90 capsule 3    SUMAtriptan (IMITREX) 100 MG tablet Take 1 tablet by mouth once as needed for Migraine May repeat dose x 1 after 2 hours. No more than 2 tablets per 24 hours. 9 tablet 3    gabapentin (NEURONTIN) 400 MG capsule Take 1 po TID prn 90 capsule 3     No current facility-administered medications on file prior to visit. Review of Systems   Constitutional: Negative for chills, diaphoresis, fatigue and fever. HENT: Negative for congestion, ear discharge and ear pain. Respiratory: Negative for cough, shortness of breath and wheezing. Cardiovascular: Negative for chest pain. Gastrointestinal: Negative for abdominal pain, diarrhea, nausea and vomiting. Endocrine: Negative for cold intolerance and heat intolerance. Genitourinary: Negative for dysuria and frequency. Skin: Positive for color change and rash. Neurological: Negative for dizziness and light-headedness. Psychiatric/Behavioral: Negative for dysphoric mood. The patient is not nervous/anxious. Objective:   BP (!) 128/90 (Site: Left Upper Arm, Position: Sitting, Cuff Size: Medium Adult)   Pulse 63   Resp 18   Ht 5' 9\" (1.753 m)   Wt 241 lb 3.2 oz (109.4 kg)   SpO2 99%   BMI 35.62 kg/m²     Physical Exam  Constitutional:       General: He is not in acute distress.      Appearance: Normal appearance. He is well-developed. Cardiovascular:      Rate and Rhythm: Normal rate and regular rhythm. Pulses: Normal pulses. Heart sounds: Normal heart sounds. No murmur. Pulmonary:      Effort: Pulmonary effort is normal. No respiratory distress. Breath sounds: Normal breath sounds. Abdominal:      General: Bowel sounds are normal. There is no distension. Palpations: Abdomen is soft. There is no mass. Tenderness: There is no abdominal tenderness. There is no guarding or rebound. Musculoskeletal:        Feet:    Feet:      Comments: B/l erythematous scaly nummular rash on dorsal surfaces of feet with central clearing   Neurological:      Mental Status: He is alert. Psychiatric:         Mood and Affect: Mood normal.         Behavior: Behavior normal.       Assessment:       Diagnosis Orders   1. Tinea pedis of both feet  ketoconazole (NIZORAL) 2 % cream   2. Hypertension, unspecified type Inadequately Controlled Hemoglobin A1C    lisinopril (PRINIVIL;ZESTRIL) 10 MG tablet    [place] restart lisinopril and continue Inderal    3. Gastroesophageal reflux disease without esophagitis Controlled omeprazole (PRILOSEC) 40 MG delayed release capsule     Plan:      Orders Placed This Encounter   Procedures    Hemoglobin A1C     Standing Status:   Future     Standing Expiration Date:   12/7/2021    CBC with Differential    Comprehensive Metabolic Panel    Hemoglobin A1C    HIV ANTIGEN/ANTIBODY     Orders Placed This Encounter   Medications    omeprazole (PRILOSEC) 40 MG delayed release capsule     Sig: TAKE 1 CAPSULE BY MOUTH EVERY DAY     Dispense:  60 capsule     Refill:  3    ketoconazole (NIZORAL) 2 % cream     Sig: Apply topically twice daily for at least 4 weeks (or for 1 week after lesions have healed).      Dispense:  1 Tube     Refill:  2    lisinopril (PRINIVIL;ZESTRIL) 10 MG tablet     Sig: Take 1 tablet by mouth daily     Dispense:  90 tablet     Refill:  3     Return in about 2 weeks (around 12/21/2020) for BP recheck.

## 2020-12-08 LAB
ESTIMATED AVERAGE GLUCOSE: 97 MG/DL
HBA1C MFR BLD: 5 %
HIV AG/AB: NONREACTIVE
SPECIMEN SOURCE: NORMAL

## 2020-12-08 ASSESSMENT — ENCOUNTER SYMPTOMS
WHEEZING: 0
SHORTNESS OF BREATH: 0
COUGH: 0
COLOR CHANGE: 1
ABDOMINAL PAIN: 0
VOMITING: 0
NAUSEA: 0
DIARRHEA: 0

## 2021-01-07 ENCOUNTER — OFFICE VISIT (OUTPATIENT)
Dept: PRIMARY CARE CLINIC | Age: 26
End: 2021-01-07
Payer: COMMERCIAL

## 2021-01-07 VITALS
HEIGHT: 69 IN | HEART RATE: 75 BPM | RESPIRATION RATE: 16 BRPM | DIASTOLIC BLOOD PRESSURE: 82 MMHG | WEIGHT: 235.4 LBS | SYSTOLIC BLOOD PRESSURE: 130 MMHG | BODY MASS INDEX: 34.87 KG/M2 | OXYGEN SATURATION: 100 %

## 2021-01-07 DIAGNOSIS — B35.3 TINEA PEDIS OF BOTH FEET: ICD-10-CM

## 2021-01-07 DIAGNOSIS — I10 HYPERTENSION, UNSPECIFIED TYPE: Primary | ICD-10-CM

## 2021-01-07 PROCEDURE — 99213 OFFICE O/P EST LOW 20 MIN: CPT | Performed by: INTERNAL MEDICINE

## 2021-01-07 ASSESSMENT — PATIENT HEALTH QUESTIONNAIRE - PHQ9
SUM OF ALL RESPONSES TO PHQ QUESTIONS 1-9: 0
SUM OF ALL RESPONSES TO PHQ QUESTIONS 1-9: 0
1. LITTLE INTEREST OR PLEASURE IN DOING THINGS: 0
2. FEELING DOWN, DEPRESSED OR HOPELESS: 0

## 2021-01-07 NOTE — PROGRESS NOTES
Subjective:      Patient ID: Harris Hernandez is a 26 y.o. male  Hypertension and tinea pedis follow up  HPI  Pt presents for follow up regarding treatment of hypertension. Currently on Inderal and lisinopril with satisfactory BP control. No lightheadedness or GI side effects. No chest pain or SOB, no one-sided weakness or slurred speech. Tinea pedis - improved but not resolved. Compliance to Nizoral cream is inadequate. Also attests to regular donning of boots causing feet to be damp.   Past Medical History:   Diagnosis Date    CAD (coronary artery disease)     murmur    Hypertension      Past Surgical History:   Procedure Laterality Date    EYE SURGERY      laser    UPPER GASTROINTESTINAL ENDOSCOPY N/A 3/25/2019    EGD ESOPHAGOGASTRODUODENOSCOPY WITH DILATION performed by Tito Ramachandran MD [address]      x2     Social History     Socioeconomic History    Marital status: Single     Spouse name: Not on file    Number of children: Not on file    Years of education: Not on file    Highest education level: Not on file   Occupational History    Not on file   Social Needs    Financial resource strain: Not on file    Food insecurity     Worry: Not on file     Inability: Not on file    Transportation needs     Medical: Not on file     Non-medical: Not on file   Tobacco Use    Smoking status: Current Some Day Smoker     Years: 1.00     Types: Cigars    Smokeless tobacco: Never Used   Substance and Sexual Activity    Alcohol use: Yes     Comment: occassional    Drug use: No    Sexual activity: Yes     Partners: Female   Lifestyle    Physical activity     Days per week: Not on file     Minutes per session: Not on file    Stress: Not on file   Relationships    Social connections     Talks on phone: Not on file     Gets together: Not on file     Attends Adventism service: Not on file     Active member of club or organization: Not on file Attends meetings of clubs or organizations: Not on file     Relationship status: Not on file    Intimate partner violence     Fear of current or ex partner: Not on file     Emotionally abused: Not on file     Physically abused: Not on file     Forced sexual activity: Not on file   Other Topics Concern    Not on file   Social History Narrative    Not on file     History reviewed. No pertinent family history. Allergies:  Patient has no known allergies. Patient Active Problem List   Diagnosis    Development delay    Dyspepsia    Dysphagia     Current Outpatient Medications on File Prior to Visit   Medication Sig Dispense Refill    omeprazole (PRILOSEC) 40 MG delayed release capsule TAKE 1 CAPSULE BY MOUTH EVERY DAY 60 capsule 3    ketoconazole (NIZORAL) 2 % cream Apply topically twice daily for at least 4 weeks (or for 1 week after lesions have healed). 1 Tube 2    lisinopril (PRINIVIL;ZESTRIL) 10 MG tablet Take 1 tablet by mouth daily 90 tablet 3    montelukast (SINGULAIR) 10 MG tablet Take 1 tablet by mouth nightly as needed (runnynose) 30 tablet 1    propranolol (INDERAL LA) 120 MG extended release capsule TAKE 1 CAPSULE BY MOUTH DAILY 90 capsule 3     No current facility-administered medications on file prior to visit. Review of Systems   Constitutional: Negative for chills, diaphoresis, fatigue and fever. HENT: Negative for congestion, ear discharge and ear pain. Respiratory: Negative for cough, shortness of breath and wheezing. Cardiovascular: Negative for chest pain. Gastrointestinal: Negative for abdominal pain, diarrhea, nausea and vomiting. Endocrine: Negative for cold intolerance and heat intolerance. Genitourinary: Negative for dysuria and frequency. Skin: Positive for rash. Neurological: Negative for dizziness and light-headedness. Psychiatric/Behavioral: Negative for dysphoric mood. The patient is not nervous/anxious.       Objective: /82 (Site: Left Upper Arm, Position: Sitting, Cuff Size: Large Adult)   Pulse 75   Resp 16   Ht 5' 9\" (1.753 m)   Wt 235 lb 6.4 oz (106.8 kg)   SpO2 100%   BMI 34.76 kg/m²     Physical Exam  Constitutional:       General: He is not in acute distress. Appearance: Normal appearance. He is well-developed. Cardiovascular:      Rate and Rhythm: Normal rate and regular rhythm. Pulses: Normal pulses. Heart sounds: Normal heart sounds. No murmur. Pulmonary:      Effort: Pulmonary effort is normal. No respiratory distress. Breath sounds: Normal breath sounds. No wheezing. Abdominal:      General: Bowel sounds are normal. There is no distension. Palpations: Abdomen is soft. Tenderness: There is no abdominal tenderness. There is no guarding. Skin:     Comments: B/l erythematous nummular rash on dorsal surfaces of feet improved, scaling resolved    Neurological:      Mental Status: He is alert. Psychiatric:         Mood and Affect: Mood normal.         Behavior: Behavior normal.         Thought Content: Thought content normal.       Assessment:       Diagnosis Orders   1. Hypertension, unspecified type Controlled    2. Tinea pedis of both feet Improving      Plan:      No orders of the defined types were placed in this encounter. No orders of the defined types were placed in this encounter. Pt advised to keep feet dry and comply with cream per recommendation. Return in about 6 months (around 7/7/2021).

## 2021-01-09 ASSESSMENT — ENCOUNTER SYMPTOMS
WHEEZING: 0
VOMITING: 0
DIARRHEA: 0
NAUSEA: 0
ABDOMINAL PAIN: 0
SHORTNESS OF BREATH: 0
COUGH: 0

## 2021-02-19 DIAGNOSIS — B35.3 TINEA PEDIS OF BOTH FEET: ICD-10-CM

## 2021-02-19 RX ORDER — KETOCONAZOLE 20 MG/G
CREAM TOPICAL
Qty: 1 TUBE | Refills: 2 | Status: SHIPPED | OUTPATIENT
Start: 2021-02-19 | End: 2022-01-13

## 2021-02-19 NOTE — TELEPHONE ENCOUNTER
patient requesting medication refill. Please approve or deny this request.    Rx requested:  Requested Prescriptions     Pending Prescriptions Disp Refills    ketoconazole (NIZORAL) 2 % cream 1 Tube 2     Sig: Apply topically twice daily for at least 4 weeks (or for 1 week after lesions have healed).          Last Office Visit:   1/7/2021      Next Visit Date:  Future Appointments   Date Time Provider Anahy Valverde   7/7/2021  9:30 AM MD Anahy Mckeon

## 2021-06-17 ENCOUNTER — TELEPHONE (OUTPATIENT)
Dept: PAIN MANAGEMENT | Age: 26
End: 2021-06-17

## 2021-06-17 NOTE — TELEPHONE ENCOUNTER
BENEFITS:    Insurance: JONATHAN  Phone: [phone]  Contact Name: Nabor Loomis Date: 1.1.2021     Plan year: YES--GISELE  Deductible: 1550.00      Deductible Met: 198.78  Allowed/benefits paid at: 90% AFTER DEDUCTIBLE  OOP: 2550.00 MET $ 315.94  Freq Limits: NO--BASED ON MEDICAL NECESSITY  Prior Auth Requirement: NO    Notes: 66329--[facility] PRE-EX CLAUSE    Call Reference #: [id number]    Time of call: 9:10AM

## 2021-06-24 ENCOUNTER — OFFICE VISIT (OUTPATIENT)
Dept: PAIN MANAGEMENT | Age: 26
End: 2021-06-24
Payer: COMMERCIAL

## 2021-06-24 VITALS — TEMPERATURE: 98.6 F | WEIGHT: 235 LBS | BODY MASS INDEX: 34.7 KG/M2

## 2021-06-24 DIAGNOSIS — M79.601 BILATERAL ARM PAIN: Primary | ICD-10-CM

## 2021-06-24 DIAGNOSIS — M79.602 BILATERAL ARM PAIN: Primary | ICD-10-CM

## 2021-06-24 PROCEDURE — 95886 MUSC TEST DONE W/N TEST COMP: CPT | Performed by: PHYSICAL MEDICINE & REHABILITATION

## 2021-06-24 PROCEDURE — 95911 NRV CNDJ TEST 9-10 STUDIES: CPT | Performed by: PHYSICAL MEDICINE & REHABILITATION

## 2021-06-25 NOTE — PROGRESS NOTES
Electromyography (EMG)/Nerve conduction studies (NCS) Report: Upper Extremities    Name: José Miguel Painter   : 1995  Date: 2021   Physician: Sherice Mijares MD        INDICATIONS: José Miguel Painter is a 23 y.o. male who presents for electrodiagnostic evaluation for cervical radicular pain by request of Dr. Keith Echevarria. His main symptom for evaluation is bilateral arm pain. He is left-handed. Both limbs are necessary to examine in order to evaluate for any evidence of systemic disease as well as establish normal baseline values from which to compare any abnormal unilateral findings. The study is explained and verbal consent to proceed is obtained. NERVE CONDUCTION STUDIES:  Sensory nerve conduction studies: Bilateral median sensory nerve conduction studies to the second digit demonstrate normal distal latencies and amplitudes. Latency is borderline normal on the left. Bilateral ulnar sensory nerve conduction studies to the fifth digit demonstrate normal distal latencies and amplitudes. Bilateral radial sensory nerve conduction studies to the base of the thumb demonstrate normal distal latencies and amplitudes. Bilateral upper limb temperatures are normal.     Motor nerve conduction studies: Bilateral median motor nerve conduction studies with pickup over the abductor pollicis brevis demonstrate normal distal latencies and amplitudes. Bilateral ulnar motor nerve conduction studies with pickup over the abductor digiti minimi demonstrate normal distal latencies and amplitudes. ELECTROMYOGRAPHY: A disposable monopolar needle is used to evaluate bilateral deltoid, biceps, triceps, pronator teres, brachioradialis, extensor indicis proprius, first dorsal interosseous, and opponens pollicis. All of the muscles sampled are free of any increased insertional activity or any abnormal spontaneous activity. Motor unit recruitment is unremarkable.  Bilateral mid cervical paraspinal muscle sampling is free of any increased insertional activity or any abnormal spontaneous activity. SUMMARY:  This study is normal:  1. There is no current electrodiagnostic evidence for an active bilateral cervical motor radiculopathy as clinically questioned. 2. There is no current evidence for a bilateral median mononeuropathy or generalized large fiber sensorimotor peripheral polyneuropathy. RECOMMENDATIONS: Today's study does not explain the patient's bilateral arm pain. The patient should follow up with Dr. Nima Ricketts as previously instructed. If his symptoms persist or worsen, further electrodiagnostic evaluation may be considered if the patient is agreeable. Clinical correlation is recommended.

## 2021-08-04 DIAGNOSIS — K21.9 GASTROESOPHAGEAL REFLUX DISEASE WITHOUT ESOPHAGITIS: ICD-10-CM

## 2021-08-05 RX ORDER — OMEPRAZOLE 40 MG/1
CAPSULE, DELAYED RELEASE ORAL
Qty: 60 CAPSULE | Refills: 3 | Status: SHIPPED | OUTPATIENT
Start: 2021-08-05 | End: 2022-01-30

## 2021-08-19 ENCOUNTER — OFFICE VISIT (OUTPATIENT)
Dept: PRIMARY CARE CLINIC | Age: 26
End: 2021-08-19
Payer: COMMERCIAL

## 2021-08-19 VITALS
HEART RATE: 70 BPM | TEMPERATURE: 98.4 F | WEIGHT: 243.8 LBS | SYSTOLIC BLOOD PRESSURE: 138 MMHG | BODY MASS INDEX: 36.11 KG/M2 | RESPIRATION RATE: 16 BRPM | DIASTOLIC BLOOD PRESSURE: 80 MMHG | OXYGEN SATURATION: 97 % | HEIGHT: 69 IN

## 2021-08-19 DIAGNOSIS — I10 HYPERTENSION, UNSPECIFIED TYPE: Primary | ICD-10-CM

## 2021-08-19 DIAGNOSIS — R63.5 WEIGHT GAIN: ICD-10-CM

## 2021-08-19 PROCEDURE — 99214 OFFICE O/P EST MOD 30 MIN: CPT | Performed by: INTERNAL MEDICINE

## 2021-08-19 RX ORDER — AMITRIPTYLINE HYDROCHLORIDE 10 MG/1
TABLET, FILM COATED ORAL
COMMUNITY
Start: 2021-07-15

## 2021-08-19 RX ORDER — ONDANSETRON 4 MG/1
TABLET, FILM COATED ORAL
COMMUNITY
Start: 2021-05-26 | End: 2022-01-13

## 2021-08-19 SDOH — ECONOMIC STABILITY: FOOD INSECURITY: WITHIN THE PAST 12 MONTHS, THE FOOD YOU BOUGHT JUST DIDN'T LAST AND YOU DIDN'T HAVE MONEY TO GET MORE.: NEVER TRUE

## 2021-08-19 SDOH — ECONOMIC STABILITY: FOOD INSECURITY: WITHIN THE PAST 12 MONTHS, YOU WORRIED THAT YOUR FOOD WOULD RUN OUT BEFORE YOU GOT MONEY TO BUY MORE.: NEVER TRUE

## 2021-08-19 ASSESSMENT — ENCOUNTER SYMPTOMS
ABDOMINAL PAIN: 0
NAUSEA: 0
COUGH: 0
WHEEZING: 0
DIARRHEA: 0
SHORTNESS OF BREATH: 0
VOMITING: 0

## 2021-08-19 ASSESSMENT — SOCIAL DETERMINANTS OF HEALTH (SDOH): HOW HARD IS IT FOR YOU TO PAY FOR THE VERY BASICS LIKE FOOD, HOUSING, MEDICAL CARE, AND HEATING?: NOT HARD AT ALL

## 2021-08-19 NOTE — PROGRESS NOTES
Subjective:      Patient ID: Jemal Kahn is a 23 y.o. male  Hypertension f/u  HPI  Pt presents for follow up regarding treatment of hypertension. Currently on Inderal and lisinopril with satisfactory BP control. No lightheadedness or GI side effects. No chest pain or SOB, no one-sided weakness or slurred speech. Weight gain noted. Attests to high calorie diet and inadequate physical activity.    Vitals 8/19/2021 6/24/2021 1/7/2021 12/7/2020 10/7/2020   Weight 243 lb 12.8 oz 235 lb 235 lb 6.4 oz 241 lb 3.2 oz 236 lb 6.4 oz     Vitals 2/28/2020 9/19/2019 5/13/2019   Weight 219 lb 212 lb 199 lb 12.8 oz     Past Medical History:   Diagnosis Date    CAD (coronary artery disease)     murmur    Chronic pain     Hypertension     Low back pain      Past Surgical History:   Procedure Laterality Date    EYE SURGERY      laser    UPPER GASTROINTESTINAL ENDOSCOPY N/A 3/25/2019    EGD ESOPHAGOGASTRODUODENOSCOPY WITH DILATION performed by Tu Yu MD at [address]      x2     Social History     Socioeconomic History    Marital status: Single     Spouse name: Not on file    Number of children: Not on file    Years of education: Not on file    Highest education level: Not on file   Occupational History    Not on file   Tobacco Use    Smoking status: Current Some Day Smoker     Years: 1.00     Types: Cigars    Smokeless tobacco: Never Used   Substance and Sexual Activity    Alcohol use: Yes     Comment: occassional    Drug use: No    Sexual activity: Yes     Partners: Female   Other Topics Concern    Not on file   Social History Narrative    Not on file     Social Determinants of Health     Financial Resource Strain: Low Risk     Difficulty of Paying Living Expenses: Not hard at all   Food Insecurity: No Food Insecurity    Worried About Running Out of Food in the Last Year: Never true    Angi of Food in the Last Year: Never true   Transportation Needs:     Lack of Transportation (Medical):  Lack of Transportation (Non-Medical):    Physical Activity:     Days of Exercise per Week:     Minutes of Exercise per Session:    Stress:     Feeling of Stress :    Social Connections:     Frequency of Communication with Friends and Family:     Frequency of Social Gatherings with Friends and Family:     Attends [employer] Services:     Active Member of Clubs or Organizations:     Attends Club or Organization Meetings:     Marital Status:    Intimate Partner Violence:     Fear of Current or Ex-Partner:     Emotionally Abused:     Physically Abused:     Sexually Abused:      Family History   Problem Relation Age of Onset    Heart Disease Mother     Hypertension Mother     Stroke Mother     Heart Disease Father     Hypertension Father     Stroke Father      Allergies:  Patient has no known allergies. Patient Active Problem List   Diagnosis    Development delay    Dyspepsia    Dysphagia     Current Outpatient Medications on File Prior to Visit   Medication Sig Dispense Refill    amitriptyline (ELAVIL) 10 MG tablet Take by mouth      omeprazole (PRILOSEC) 40 MG delayed release capsule TAKE 1 CAPSULE BY MOUTH EVERY DAY 60 capsule 3    lisinopril (PRINIVIL;ZESTRIL) 10 MG tablet Take 1 tablet by mouth daily 90 tablet 3    propranolol (INDERAL LA) 120 MG extended release capsule TAKE 1 CAPSULE BY MOUTH DAILY 90 capsule 3    ondansetron (ZOFRAN) 4 MG tablet TAKE 1 TABLET BY MOUTH FOUR TIMES DAILY AS NEEDED FORNAUSEA AND VOMITING      ketoconazole (NIZORAL) 2 % cream Apply topically twice daily for at least 4 weeks (or for 1 week after lesions have healed). (Patient not taking: Reported on 8/19/2021) 1 Tube 2     No current facility-administered medications on file prior to visit. Review of Systems   Constitutional: Negative for chills, diaphoresis, fatigue and fever. HENT: Negative for congestion, ear discharge and ear pain.     Respiratory: Negative for cough, shortness of breath and wheezing. Cardiovascular: Negative for chest pain. Gastrointestinal: Negative for abdominal pain, diarrhea, nausea and vomiting. Endocrine: Negative for cold intolerance and heat intolerance. Genitourinary: Negative for dysuria and frequency. Neurological: Negative for dizziness and light-headedness. Objective:   /80 (Site: Left Upper Arm, Position: Sitting, Cuff Size: Large Adult)   Pulse 70   Temp 98.4 °F (36.9 °C) (Oral)   Resp 16   Ht 5' 9\" (1.753 m)   Wt 243 lb 12.8 oz (110.6 kg)   SpO2 97%   BMI 36.00 kg/m²     Physical Exam  Constitutional:       General: He is not in acute distress. Appearance: He is not diaphoretic. Cardiovascular:      Rate and Rhythm: Normal rate and regular rhythm. Pulses: Normal pulses. Heart sounds: Normal heart sounds, S1 normal and S2 normal. No murmur heard. Pulmonary:      Effort: Pulmonary effort is normal. No respiratory distress. Breath sounds: Normal breath sounds. No wheezing or rales. Chest:      Chest wall: No tenderness. Abdominal:      General: Bowel sounds are normal.      Tenderness: There is no abdominal tenderness. Neurological:      General: No focal deficit present. Mental Status: He is alert. Cranial Nerves: No cranial nerve deficit. Psychiatric:         Mood and Affect: Mood normal.         Thought Content: Thought content normal.       Assessment:       Diagnosis Orders   1. Hypertension, unspecified type Controlled    2. Weight gain       Plan:      No orders of the defined types were placed in this encounter. Will  Mao OTC. Return in about 6 months (around 2/19/2022) for follow up.

## 2021-09-30 DIAGNOSIS — G43.019 INTRACTABLE MIGRAINE WITHOUT AURA AND WITHOUT STATUS MIGRAINOSUS: ICD-10-CM

## 2021-09-30 RX ORDER — PROPRANOLOL HYDROCHLORIDE 120 MG/1
120 CAPSULE, EXTENDED RELEASE ORAL DAILY
Qty: 90 CAPSULE | Refills: 3 | Status: SHIPPED | OUTPATIENT
Start: 2021-09-30 | End: 2022-06-19

## 2021-11-20 ENCOUNTER — TELEPHONE (OUTPATIENT)
Dept: PRIMARY CARE CLINIC | Age: 26
End: 2021-11-20

## 2021-11-20 NOTE — TELEPHONE ENCOUNTER
----- Message from Adele Santana sent at 11/17/2021  3:46 PM EST -----  Subject: Message to Provider    QUESTIONS  Information for Provider? 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  ---------------------------------------------------------------------------  --------------  CALL BACK INFO  What is the best way for the office to contact you? OK to leave message on   voicemail  Preferred Call Back Phone Number? [phone]  ---------------------------------------------------------------------------  --------------  SCRIPT ANSWERS  Relationship to Patient?  Self

## 2021-11-21 NOTE — TELEPHONE ENCOUNTER
Dhruv Mims,    This is DrCaron Giang Blood. Could you pls tell me what your recommendation would be about this request from my patient? I do not have any concrete reason to exempt him from COVID vaccine.     Thank you    Dr. Karen Higuera

## 2021-11-24 ENCOUNTER — TELEPHONE (OUTPATIENT)
Dept: FAMILY MEDICINE CLINIC | Age: 26
End: 2021-11-24

## 2021-11-24 NOTE — TELEPHONE ENCOUNTER
----- Message from Canda Dose sent at 11/24/2021  9:27 AM EST -----  Subject: Message to Provider    QUESTIONS  Information for Provider? patient of Dr. Chucky Arrington is calling to inform   her that patient was tested positive with Covid-19, took the test last   11/21.   ---------------------------------------------------------------------------  --------------  CALL BACK INFO  What is the best way for the office to contact you? OK to leave message on   voicemail  Preferred Call Back Phone Number? [phone]  ---------------------------------------------------------------------------  --------------  SCRIPT ANSWERS  Relationship to Patient?  Self

## 2021-11-29 ENCOUNTER — VIRTUAL VISIT (OUTPATIENT)
Dept: PRIMARY CARE CLINIC | Age: 26
End: 2021-11-29
Payer: COMMERCIAL

## 2021-11-29 DIAGNOSIS — J12.82 PNEUMONIA DUE TO COVID-19 VIRUS: Primary | ICD-10-CM

## 2021-11-29 DIAGNOSIS — U07.1 PNEUMONIA DUE TO COVID-19 VIRUS: Primary | ICD-10-CM

## 2021-11-29 PROCEDURE — 99442 PR PHYS/QHP TELEPHONE EVALUATION 11-20 MIN: CPT | Performed by: INTERNAL MEDICINE

## 2021-11-29 RX ORDER — ALBUTEROL SULFATE 90 UG/1
AEROSOL, METERED RESPIRATORY (INHALATION)
COMMUNITY
Start: 2021-11-26

## 2021-11-29 RX ORDER — GUAIFENESIN AND CODEINE PHOSPHATE 100; 10 MG/5ML; MG/5ML
10 SOLUTION ORAL 2 TIMES DAILY PRN
Qty: 118 ML | Refills: 0 | Status: SHIPPED | OUTPATIENT
Start: 2021-11-29 | End: 2021-12-06

## 2021-11-29 RX ORDER — AZITHROMYCIN 250 MG/1
TABLET, FILM COATED ORAL
COMMUNITY
Start: 2021-11-26 | End: 2022-01-13

## 2021-11-29 RX ORDER — BENZONATATE 100 MG/1
CAPSULE ORAL
COMMUNITY
Start: 2021-11-26 | End: 2022-04-20

## 2021-11-29 NOTE — PROGRESS NOTES
India Montanez is a 23 y.o. male evaluated via telephone on 11/29/2021. Consent:  He and/or health care decision maker is aware that that he may receive a bill for this telephone service, depending on his insurance coverage, and has provided verbal consent to proceed: Yes    Documentation:  I communicated with the patient and/or health care decision maker. Details of this discussion including any medical advice provided:     Pt presents for follow up regarding COVID 19 pneumonia. Diagnosed with COVID pneumonia 8 days ago. Seen in ER for cough, SOB and hypoxia. Placed on steroid and inhalers. Cough is productive of yellowish phlegm. No fever more fever or chills. Pt advised to remain isolated: For at least 10 days after onset of symptoms  At least 24 hours after fever resolution  Improvement in symptoms. If more SOB or fatigued, go to ER ASAP. Otherwise, rest, fluid and APAP prn. I affirm this is a Patient Initiated Episode with a Patient who has not had a related appointment within my department in the past 7 days or scheduled within the next 24 hours. Patient identification was verified at the start of the visit: Yes    Total Time: minutes: 11 minutes    The visit was conducted pursuant to the emergency declaration under the [facility], [facility] authority and the [employer] General Act. Patient identification was verified, and a caregiver was present when appropriate. The patient was located in a state where the provider was credentialed to provide care.     Note: not billable if this call serves to triage the patient into an appointment for the relevant concern      Parag Gatica MD

## 2021-11-30 ENCOUNTER — TELEPHONE (OUTPATIENT)
Dept: PRIMARY CARE CLINIC | Age: 26
End: 2021-11-30

## 2021-11-30 NOTE — TELEPHONE ENCOUNTER
----- Message from Cheikh Garcia Dr sent at 11/26/2021 11:05 AM EST -----  Subject: Message to Provider    QUESTIONS  Information for Provider? Per Patient? returning a call to the office, as   instructed after he left the ER. Pt has been discharged and is on his way   home. requesting someone pascale him asacasandra at [phone]  ---------------------------------------------------------------------------  --------------  [address]  What is the best way for the office to contact you? OK to leave message on   voicemail  Preferred Call Back Phone Number?  [phone]  ---------------------------------------------------------------------------  --------------  SCRIPT ANSWERS  undefined

## 2021-12-22 ENCOUNTER — TELEPHONE (OUTPATIENT)
Dept: FAMILY MEDICINE CLINIC | Age: 26
End: 2021-12-22

## 2021-12-22 DIAGNOSIS — I10 HYPERTENSION, UNSPECIFIED TYPE: ICD-10-CM

## 2021-12-22 RX ORDER — LISINOPRIL 10 MG/1
10 TABLET ORAL DAILY
Qty: 90 TABLET | Refills: 3 | Status: SHIPPED | OUTPATIENT
Start: 2021-12-22 | End: 2022-09-05

## 2021-12-22 NOTE — TELEPHONE ENCOUNTER
----- Message from Daphne Polk sent at 12/22/2021  9:48 AM EST -----  Subject: Refill Request    QUESTIONS  Name of Medication? lisinopril (PRINIVIL;ZESTRIL) 10 MG tablet  Patient-reported dosage and instructions? 10 MG   How many days do you have left? 0  [facility]? [id number]  Pharmacy phone number (if available)? [phone]  Additional Information for Provider? Patient states that he is feeling   better after having covid 19 and has returned to work as well.   ---------------------------------------------------------------------------  --------------  Cindy CHE  What is the best way for the office to contact you? OK to leave message on   voicemail  Preferred Call Back Phone Number?  [phone]

## 2022-01-04 ENCOUNTER — TELEPHONE (OUTPATIENT)
Dept: PRIMARY CARE CLINIC | Age: 27
End: 2022-01-04

## 2022-01-04 NOTE — TELEPHONE ENCOUNTER
Patient states he was diagnosed with COVID 6 weeks ago and he wants to know when is the earliest he can get his vaccine (he hasn't had any of the covid injections)  Please advise, wants to know how long he should wait since being positive

## 2022-01-13 ENCOUNTER — OFFICE VISIT (OUTPATIENT)
Dept: PRIMARY CARE CLINIC | Age: 27
End: 2022-01-13
Payer: COMMERCIAL

## 2022-01-13 VITALS
OXYGEN SATURATION: 99 % | BODY MASS INDEX: 34.87 KG/M2 | WEIGHT: 235.4 LBS | RESPIRATION RATE: 18 BRPM | SYSTOLIC BLOOD PRESSURE: 120 MMHG | DIASTOLIC BLOOD PRESSURE: 68 MMHG | HEIGHT: 69 IN | HEART RATE: 87 BPM

## 2022-01-13 DIAGNOSIS — R19.8 RECTAL PRESSURE: ICD-10-CM

## 2022-01-13 DIAGNOSIS — K59.00 CONSTIPATION, UNSPECIFIED CONSTIPATION TYPE: ICD-10-CM

## 2022-01-13 DIAGNOSIS — U07.1 PNEUMONIA DUE TO COVID-19 VIRUS: Primary | ICD-10-CM

## 2022-01-13 DIAGNOSIS — I10 HYPERTENSION, UNSPECIFIED TYPE: ICD-10-CM

## 2022-01-13 DIAGNOSIS — J12.82 PNEUMONIA DUE TO COVID-19 VIRUS: Primary | ICD-10-CM

## 2022-01-13 PROCEDURE — 99214 OFFICE O/P EST MOD 30 MIN: CPT | Performed by: INTERNAL MEDICINE

## 2022-01-13 RX ORDER — SENNA AND DOCUSATE SODIUM 50; 8.6 MG/1; MG/1
2 TABLET, FILM COATED ORAL DAILY PRN
Qty: 60 TABLET | Refills: 1 | Status: SHIPPED | OUTPATIENT
Start: 2022-01-13 | End: 2022-06-19

## 2022-01-13 ASSESSMENT — PATIENT HEALTH QUESTIONNAIRE - PHQ9
SUM OF ALL RESPONSES TO PHQ9 QUESTIONS 1 & 2: 0
SUM OF ALL RESPONSES TO PHQ QUESTIONS 1-9: 0
1. LITTLE INTEREST OR PLEASURE IN DOING THINGS: 0
2. FEELING DOWN, DEPRESSED OR HOPELESS: 0
SUM OF ALL RESPONSES TO PHQ QUESTIONS 1-9: 0

## 2022-01-13 ASSESSMENT — ENCOUNTER SYMPTOMS
VOMITING: 0
ABDOMINAL PAIN: 0
RECTAL PAIN: 1
CONSTIPATION: 1
COUGH: 0
WHEEZING: 0
NAUSEA: 0
SHORTNESS OF BREATH: 0
DIARRHEA: 0
BLOOD IN STOOL: 0

## 2022-01-13 NOTE — PROGRESS NOTES
Subjective:      Patient ID: Christy Tomlin is a 23 y.o. male    Needs completion of disability form  HPI  Pt presents for follow up regarding COVID 19 pneumonia. Diagnosed on 11/21/2021, evaluated in ER but not admitted. Just returned to work 12/2021. Now all symptoms resolved     Rectal pain x 2 weeks   Pain is achy when sitting down. No painful defecation, no bloody stools. Stools are hard. No abd pain, no diarrhea. Diet is rch in carbs. Past Medical History:   Diagnosis Date    CAD (coronary artery disease)     murmur    Chronic pain     Hypertension     Low back pain      Past Surgical History:   Procedure Laterality Date    EYE SURGERY      laser    UPPER GASTROINTESTINAL ENDOSCOPY N/A 3/25/2019    EGD ESOPHAGOGASTRODUODENOSCOPY WITH DILATION performed by Zuleyma Cartagena MD [address]      x2     Social History     Socioeconomic History    Marital status: Single     Spouse name: Not on file    Number of children: Not on file    Years of education: Not on file    Highest education level: Not on file   Occupational History    Not on file   Tobacco Use    Smoking status: Current Some Day Smoker     Years: 1.00     Types: Cigars    Smokeless tobacco: Never Used   Substance and Sexual Activity    Alcohol use: Yes     Comment: occassional    Drug use: No    Sexual activity: Yes     Partners: Female   Other Topics Concern    Not on file   Social History Narrative    Not on file     Social Determinants of Health     Financial Resource Strain: Low Risk     Difficulty of Paying Living Expenses: Not hard at all   Food Insecurity: No Food Insecurity    Worried About [address] in the Last Year: Never true    [address] in the Last Year: Never true   Transportation Needs:     Lack of Transportation (Medical): Not on file    Lack of Transportation (Non-Medical):  Not on file   Physical Activity:     Days of Exercise per Week: Not on file    Minutes of Exercise per Session: Not on file   Stress:     Feeling of Stress : Not on file   Social Connections:     Frequency of Communication with Friends and Family: Not on file    Frequency of Social Gatherings with Friends and Family: Not on file    Attends [employer]oration[employer] Services: Not on file    Active Member of [address] [place] [address] or Organizations: Not on file    Attends Club or Organization Meetings: Not on file    Marital Status: Not on file   Intimate Partner Violence:     Fear of Current or Ex-Partner: Not on file    Emotionally Abused: Not on file    Physically Abused: Not on file    Sexually Abused: Not on file   Housing Stability:     Unable to Pay for Housing in the Last Year: Not on file    Number of [place] in the Last Year: Not on file    Unstable Housing in the Last Year: Not on file     Family History   Problem Relation Age of Onset    Heart Disease Mother     Hypertension Mother     Stroke Mother     Heart Disease Father     Hypertension Father     Stroke Father      Allergies:  Patient has no known allergies. Patient Active Problem List   Diagnosis    Development delay    Dyspepsia    Dysphagia     Current Outpatient Medications on File Prior to Visit   Medication Sig Dispense Refill    lisinopril (PRINIVIL;ZESTRIL) 10 MG tablet Take 1 tablet by mouth daily 90 tablet 3    albuterol sulfate  (90 Base) MCG/ACT inhaler INHALE 2 PUFFS BY MOUTH EVERY 4 HOURS AS NEEDED      benzonatate (TESSALON) 100 MG capsule TAKE 1 CAPSULE BY MOUTH EVERY 8 HOURS AS NEEDED      propranolol (INDERAL LA) 120 MG extended release capsule TAKE 1 CAPSULE BY MOUTH DAILY 90 capsule 3    amitriptyline (ELAVIL) 10 MG tablet Take by mouth      omeprazole (PRILOSEC) 40 MG delayed release capsule TAKE 1 CAPSULE BY MOUTH EVERY DAY 60 capsule 3     No current facility-administered medications on file prior to visit.      Review of Systems   Constitutional: Negative for chills, diaphoresis, fatigue and fever. HENT: Negative for congestion, ear discharge and ear pain. Respiratory: Negative for cough, shortness of breath and wheezing. Cardiovascular: Negative for chest pain. Gastrointestinal: Positive for constipation and rectal pain. Negative for abdominal pain, blood in stool, diarrhea, nausea and vomiting. Endocrine: Negative for cold intolerance and heat intolerance. Genitourinary: Negative for dysuria and frequency. Neurological: Negative for dizziness and light-headedness. Objective:   /68   Pulse 87   Resp 18   Ht 5' 9\" (1.753 m)   Wt 235 lb 6.4 oz (106.8 kg)   SpO2 99%   BMI 34.76 kg/m²     Physical Exam  Constitutional:       General: He is not in acute distress. Appearance: He is not diaphoretic. Cardiovascular:      Rate and Rhythm: Normal rate and regular rhythm. Pulses: Normal pulses. Heart sounds: Normal heart sounds, S1 normal and S2 normal.   Pulmonary:      Effort: Pulmonary effort is normal. No respiratory distress. Breath sounds: Normal breath sounds. No wheezing or rales. Chest:      Chest wall: No tenderness. Abdominal:      General: Bowel sounds are normal.      Tenderness: There is no abdominal tenderness. Neurological:      Mental Status: He is alert. Assessment:       Diagnosis Orders   1. Pneumonia due to COVID-19 virus      resolved. 2. Rectal pressure      likely from constipation    3. Constipation, unspecified constipation type  sennosides-docusate sodium (SENOKOT-S) 8.6-50 MG tablet   4. Hypertension, unspecified type Controlled      Plan:      No orders of the defined types were placed in this encounter. Orders Placed This Encounter   Medications    sennosides-docusate sodium (SENOKOT-S) 8.6-50 MG tablet     Sig: Take 2 tablets by mouth daily as needed for Constipation     Dispense:  60 tablet     Refill:  1   High fiber an low carb diet recommended. Water intake increase    Disability form will be completed. .Return in about 1 month (around 2/13/2022), or if symptoms worsen or fail to improve, for follow up, with PCP.

## 2022-01-22 ENCOUNTER — TELEPHONE (OUTPATIENT)
Dept: FAMILY MEDICINE CLINIC | Age: 27
End: 2022-01-22

## 2022-01-22 NOTE — TELEPHONE ENCOUNTER
----- Message from Olga Lidia Llanos MA sent at 1/19/2022  3:46 PM EST -----  Subject: Message to Provider    QUESTIONS  Information for Provider? Patient states the read group never received fax   for [facility] paperwork please fax again as soon as possible.   ---------------------------------------------------------------------------  --------------  CALL BACK INFO  What is the best way for the office to contact you? OK to leave message on   voicemail  Preferred Call Back Phone Number? [phone]  ---------------------------------------------------------------------------  --------------  SCRIPT ANSWERS  Relationship to Patient?  Self

## 2022-01-28 DIAGNOSIS — K21.9 GASTROESOPHAGEAL REFLUX DISEASE WITHOUT ESOPHAGITIS: ICD-10-CM

## 2022-01-30 RX ORDER — OMEPRAZOLE 40 MG/1
CAPSULE, DELAYED RELEASE ORAL
Qty: 60 CAPSULE | Refills: 3 | Status: SHIPPED | OUTPATIENT
Start: 2022-01-30 | End: 2022-09-05

## 2022-04-20 ENCOUNTER — OFFICE VISIT (OUTPATIENT)
Dept: PRIMARY CARE CLINIC | Age: 27
End: 2022-04-20
Payer: COMMERCIAL

## 2022-04-20 VITALS
HEART RATE: 87 BPM | HEIGHT: 69 IN | BODY MASS INDEX: 36.43 KG/M2 | SYSTOLIC BLOOD PRESSURE: 112 MMHG | TEMPERATURE: 96.3 F | RESPIRATION RATE: 18 BRPM | OXYGEN SATURATION: 99 % | DIASTOLIC BLOOD PRESSURE: 70 MMHG | WEIGHT: 246 LBS

## 2022-04-20 DIAGNOSIS — B35.3 TINEA PEDIS OF BOTH FEET: ICD-10-CM

## 2022-04-20 PROCEDURE — 99214 OFFICE O/P EST MOD 30 MIN: CPT | Performed by: INTERNAL MEDICINE

## 2022-04-20 RX ORDER — KETOCONAZOLE 20 MG/G
CREAM TOPICAL
Qty: 1 EACH | Refills: 2 | Status: CANCELLED | OUTPATIENT
Start: 2022-04-20

## 2022-04-20 RX ORDER — CLOTRIMAZOLE AND BETAMETHASONE DIPROPIONATE 10; .64 MG/G; MG/G
CREAM TOPICAL
Qty: 1 EACH | Refills: 3 | Status: SHIPPED | OUTPATIENT
Start: 2022-04-20 | End: 2022-05-10

## 2022-04-20 ASSESSMENT — ENCOUNTER SYMPTOMS
ABDOMINAL PAIN: 0
COUGH: 0
COLOR CHANGE: 1
WHEEZING: 0
DIARRHEA: 0
VOMITING: 0
NAUSEA: 0
SHORTNESS OF BREATH: 0

## 2022-04-20 NOTE — PROGRESS NOTES
Subjective:      Patient ID: Jenifer Turner is a 33 y.o. male    Foot rash x 2 months   HPI  Pt presents with 2 months of itchy rash on top of both feet. No pain or swelling, no fever or chills. No change in detergent, lotion or new clothing. No plant exposure, no new diet or meds. Treated for tinea pedis in 12/2020 with partial improvement. Past Medical History:   Diagnosis Date    CAD (coronary artery disease)     murmur    Chronic pain     Hypertension     Low back pain      Past Surgical History:   Procedure Laterality Date    EYE SURGERY      laser    UPPER GASTROINTESTINAL ENDOSCOPY N/A 3/25/2019    EGD ESOPHAGOGASTRODUODENOSCOPY WITH DILATION performed by Catina Roberts MD [address]      x2     Social History     Socioeconomic History    Marital status: Single     Spouse name: Not on file    Number of children: Not on file    Years of education: Not on file    Highest education level: Not on file   Occupational History    Not on file   Tobacco Use    Smoking status: Current Some Day Smoker     Years: 1.00     Types: Cigars    Smokeless tobacco: Never Used   Substance and Sexual Activity    Alcohol use: Yes     Comment: occassional    Drug use: No    Sexual activity: Yes     Partners: Female   Other Topics Concern    Not on file   Social History Narrative    Not on file     Social Determinants of Health     Financial Resource Strain: Low Risk     Difficulty of Paying Living Expenses: Not hard at all   Food Insecurity: No Food Insecurity    Worried About [address] in the Last Year: Never true    [address] [facility] [address] in the Last Year: Never true   Transportation Needs:     Lack of Transportation (Medical): Not on file    Lack of Transportation (Non-Medical):  Not on file   Physical Activity:     Days of Exercise per Week: Not on file    Minutes of Exercise per Session: Not on file   Stress:     Feeling of Stress : Not on file Social Connections:     Frequency of Communication with Friends and Family: Not on file    Frequency of Social Gatherings with Friends and Family: Not on file    Attends Anabaptism Services: Not on file    Active Member of Clubs or Organizations: Not on file    Attends Club or Organization Meetings: Not on file    Marital Status: Not on file   Intimate Partner Violence:     Fear of Current or Ex-Partner: Not on file    Emotionally Abused: Not on file    Physically Abused: Not on file    Sexually Abused: Not on file   Housing Stability:     Unable to Pay for Housing in the Last Year: Not on file    Number of [place] in the Last Year: Not on file    Unstable Housing in the Last Year: Not on file     Family History   Problem Relation Age of Onset    Heart Disease Mother     Hypertension Mother     Stroke Mother     Heart Disease Father     Hypertension Father     Stroke Father      Allergies:  Patient has no known allergies. Patient Active Problem List   Diagnosis    Development delay    Dyspepsia    Dysphagia     Current Outpatient Medications on File Prior to Visit   Medication Sig Dispense Refill    omeprazole (PRILOSEC) 40 MG delayed release capsule TAKE 1 CAPSULE BY MOUTH EVERY DAY 60 capsule 3    sennosides-docusate sodium (SENOKOT-S) 8.6-50 MG tablet Take 2 tablets by mouth daily as needed for Constipation 60 tablet 1    lisinopril (PRINIVIL;ZESTRIL) 10 MG tablet Take 1 tablet by mouth daily 90 tablet 3    albuterol sulfate  (90 Base) MCG/ACT inhaler INHALE 2 PUFFS BY MOUTH EVERY 4 HOURS AS NEEDED      propranolol (INDERAL LA) 120 MG extended release capsule TAKE 1 CAPSULE BY MOUTH DAILY 90 capsule 3    amitriptyline (ELAVIL) 10 MG tablet Take by mouth       No current facility-administered medications on file prior to visit. Review of Systems   Constitutional: Negative for chills, diaphoresis, fatigue and fever.    Respiratory: Negative for cough, shortness of breath and wheezing. Cardiovascular: Negative for chest pain. Gastrointestinal: Negative for abdominal pain, diarrhea, nausea and vomiting. Endocrine: Negative for cold intolerance and heat intolerance. Genitourinary: Negative for dysuria and frequency. Skin: Positive for color change and rash. Neurological: Negative for dizziness and light-headedness. Objective:   /70   Pulse 87   Temp 96.3 °F (35.7 °C)   Resp 18   Ht 5' 9\" (1.753 m)   Wt 246 lb (111.6 kg)   SpO2 99%   BMI 36.33 kg/m²     Physical Exam  Constitutional:       General: He is not in acute distress. Appearance: He is not diaphoretic. Cardiovascular:      Rate and Rhythm: Normal rate and regular rhythm. Heart sounds: Normal heart sounds, S1 normal and S2 normal.   Pulmonary:      Effort: Pulmonary effort is normal. No respiratory distress. Breath sounds: Normal breath sounds. No wheezing or rales. Chest:      Chest wall: No tenderness. Abdominal:      General: Bowel sounds are normal.      Tenderness: There is no abdominal tenderness. Musculoskeletal:      Comments: B/l erythematous scaly nummular rash on dorsal surfaces of feet with central clearing and excoriation marks     Neurological:      Mental Status: He is alert. Assessment:       Diagnosis Orders   1. Tinea pedis of both feet  clotrimazole-betamethasone (LOTRISONE) 1-0.05 % cream    possible allergic contact dermatitis as well. Plan:      No orders of the defined types were placed in this encounter. Orders Placed This Encounter   Medications    clotrimazole-betamethasone (LOTRISONE) 1-0.05 % cream     Sig: Apply topically 2 times daily x 4 weeks     Dispense:  1 each     Refill:  3   Pt advised to change his socks. Return if symptoms worsen or fail to improve, for with PCP.

## 2022-04-29 ENCOUNTER — TELEPHONE (OUTPATIENT)
Dept: PRIMARY CARE CLINIC | Age: 27
End: 2022-04-29

## 2022-04-29 DIAGNOSIS — R21 RASH: ICD-10-CM

## 2022-04-29 DIAGNOSIS — B35.3 TINEA PEDIS OF BOTH FEET: Primary | ICD-10-CM

## 2022-04-29 RX ORDER — DIPHENHYDRAMINE HCL 25 MG
25 TABLET ORAL EVERY 6 HOURS PRN
Qty: 20 TABLET | Refills: 0 | Status: SHIPPED | OUTPATIENT
Start: 2022-04-29 | End: 2022-05-04 | Stop reason: SDUPTHER

## 2022-04-29 RX ORDER — PREDNISONE 20 MG/1
40 TABLET ORAL DAILY
Qty: 20 TABLET | Refills: 0 | Status: SHIPPED | OUTPATIENT
Start: 2022-04-29 | End: 2022-05-09

## 2022-04-29 NOTE — TELEPHONE ENCOUNTER
----- Message from Eleazar Chino sent at 4/29/2022  1:25 PM EDT -----  Subject: Medication Problem    QUESTIONS  Name of Medication? clotrimazole-betamethasone (LOTRISONE) 1-0.05 % cream  Patient-reported dosage and instructions? 1-0.05 cream 2x daily   What question or problem do you have with the medication? Rx is working   for ringworm on foot but pt is experiencing an allergic reaction, is   itching all over body and has a rash all over body. Preferred Pharmacy? [id number] Audelia Rodgers, [address] [place] [address] [address] phone number (if available)? [phone]  Additional Information for Provider?   ---------------------------------------------------------------------------  --------------  CALL BACK INFO  What is the best way for the office to contact you? OK to leave message on   voicemail  Preferred Call Back Phone Number? [phone]  ---------------------------------------------------------------------------  --------------  SCRIPT ANSWERS  Relationship to Patient?  Self

## 2022-04-29 NOTE — PROGRESS NOTES
Subjective:      Patient ID: Zuleima Dong is a 33 y.o. male    HPI    Past Medical History:   Diagnosis Date    CAD (coronary artery disease)     murmur    Chronic pain     Hypertension     Low back pain      Past Surgical History:   Procedure Laterality Date    EYE SURGERY      laser    UPPER GASTROINTESTINAL ENDOSCOPY N/A 3/25/2019    EGD ESOPHAGOGASTRODUODENOSCOPY WITH DILATION performed by Aury Jenkins MD [address]      x2     Social History     Socioeconomic History    Marital status: Single     Spouse name: Not on file    Number of children: Not on file    Years of education: Not on file    Highest education level: Not on file   Occupational History    Not on file   Tobacco Use    Smoking status: Current Some Day Smoker     Years: 1.00     Types: Cigars    Smokeless tobacco: Never Used   Substance and Sexual Activity    Alcohol use: Yes     Comment: occassional    Drug use: No    Sexual activity: Yes     Partners: Female   Other Topics Concern    Not on file   Social History Narrative    Not on file     Social Determinants of Health     Financial Resource Strain: Low Risk     Difficulty of Paying Living Expenses: Not hard at all   Food Insecurity: No Food Insecurity    Worried About [address] [facility] in the Last Year: Never true    [address] [facility] in the Last Year: Never true   Transportation Needs:     Lack of Transportation (Medical): Not on file    Lack of Transportation (Non-Medical):  Not on file   Physical Activity:     Days of Exercise per Week: Not on file    Minutes of Exercise per Session: Not on file   Stress:     Feeling of Stress : Not on file   Social Connections:     Frequency of Communication with Friends and Family: Not on file    Frequency of Social Gatherings with Friends and Family: Not on file    Attends Jewish Services: Not on file    Active Member of Clubs or Organizations: Not on file    Attends Club or Organization Meetings: Not on file    Marital Status: Not on file   Intimate Partner Violence:     Fear of Current or Ex-Partner: Not on file    Emotionally Abused: Not on file    Physically Abused: Not on file    Sexually Abused: Not on file   Housing Stability:     Unable to Pay for Housing in the Last Year: Not on file    Number of [place] in the Last Year: Not on file    Unstable Housing in the Last Year: Not on file     Family History   Problem Relation Age of Onset    Heart Disease Mother     Hypertension Mother     Stroke Mother     Heart Disease Father     Hypertension Father     Stroke Father      Allergies:  Patient has no known allergies. Patient Active Problem List   Diagnosis    Development delay    Dyspepsia    Dysphagia     Current Outpatient Medications on File Prior to Visit   Medication Sig Dispense Refill    clotrimazole-betamethasone (LOTRISONE) 1-0.05 % cream Apply topically 2 times daily x 4 weeks 1 each 3    omeprazole (PRILOSEC) 40 MG delayed release capsule TAKE 1 CAPSULE BY MOUTH EVERY DAY 60 capsule 3    sennosides-docusate sodium (SENOKOT-S) 8.6-50 MG tablet Take 2 tablets by mouth daily as needed for Constipation 60 tablet 1    lisinopril (PRINIVIL;ZESTRIL) 10 MG tablet Take 1 tablet by mouth daily 90 tablet 3    albuterol sulfate  (90 Base) MCG/ACT inhaler INHALE 2 PUFFS BY MOUTH EVERY 4 HOURS AS NEEDED      propranolol (INDERAL LA) 120 MG extended release capsule TAKE 1 CAPSULE BY MOUTH DAILY 90 capsule 3    amitriptyline (ELAVIL) 10 MG tablet Take by mouth       No current facility-administered medications on file prior to visit. Review of Systems    Objective: There were no vitals taken for this visit. Physical Exam    Assessment:       Diagnosis Orders   1.  Tinea pedis of both feet  ROSA Claudio DO, D[facility] [place]         Plan:      Orders Placed This Encounter   Procedures    ROSA Claudio DO, Dermatology, [place]     Referral Priority:   Routine     Referral Type:   Eval and Treat     Referral Reason:   Specialty Services Required     Referred to Provider:   Hemant Abrams DO     Requested Specialty:   Dermatology     Number of Visits Requested:   1     No orders of the defined types were placed in this encounter. No follow-ups on file.

## 2022-05-03 ENCOUNTER — TELEPHONE (OUTPATIENT)
Dept: PRIMARY CARE CLINIC | Age: 27
End: 2022-05-03

## 2022-05-03 NOTE — TELEPHONE ENCOUNTER
----- Message from Jayashree De Luna sent at 5/3/2022  1:16 PM EDT -----  Subject: Medication Problem    QUESTIONS  Name of Medication? clotrimazole-betamethasone (LOTRISONE) 1-0.05 % cream  Patient-reported dosage and instructions? 0.5%  What question or problem do you have with the medication? pt states he is   allergic to this cream asking for another cream due to the fungus on his   feet   Preferred Pharmacy? [id number] Jourdan Rmsvitlana, [address] [place] [address] [address] phone number (if available)? [phone]  Additional Information for Provider?   ---------------------------------------------------------------------------  --------------  CALL BACK INFO  What is the best way for the office to contact you? OK to leave message on   voicemail  Preferred Call Back Phone Number? [phone]  ---------------------------------------------------------------------------  --------------  SCRIPT ANSWERS  Relationship to Patient?  Self

## 2022-05-03 NOTE — TELEPHONE ENCOUNTER
----- Message from Jose Miguel Maria sent at 5/3/2022  1:16 PM EDT -----  Subject: Medication Problem    QUESTIONS  Name of Medication? clotrimazole-betamethasone (LOTRISONE) 1-0.05 % cream  Patient-reported dosage and instructions? 0.5%  What question or problem do you have with the medication? pt states he is   allergic to this cream asking for another cream due to the fungus on his   feet   Preferred Pharmacy? [id number] Vashon Sam, [address] [place] [address],  [address] phone number (if available)? [phone]  Additional Information for Provider?   ---------------------------------------------------------------------------  --------------  CALL BACK INFO  What is the best way for the office to contact you? OK to leave message on   voicemail  Preferred Call Back Phone Number? [phone]  ---------------------------------------------------------------------------  --------------  SCRIPT ANSWERS  Relationship to Patient?  Self

## 2022-05-03 NOTE — TELEPHONE ENCOUNTER
----- Message from Lorenzo Rios sent at 5/3/2022  1:18 PM EDT -----  Subject: Medication Problem    QUESTIONS  Name of Medication? diphenhydrAMINE (BENADRYL) 25 MG tablet  Patient-reported dosage and instructions? 25mg   What question or problem do you have with the medication? med should be   sent to [facility]   Preferred Pharmacy? [id number] Juvenal [place]ond, [address] [facility],  [address] phone number (if available)? [phone]  Additional Information for Provider?   ---------------------------------------------------------------------------  --------------  CALL BACK INFO  What is the best way for the office to contact you? OK to leave message on   voicemail  Preferred Call Back Phone Number? [phone]  ---------------------------------------------------------------------------  --------------  SCRIPT ANSWERS  Relationship to Patient?  Self

## 2022-05-03 NOTE — TELEPHONE ENCOUNTER
----- Message from Vanita Flaherty sent at 5/3/2022  1:18 PM EDT -----  Subject: Medication Problem    QUESTIONS  Name of Medication? predniSONE (DELTASONE) 20 MG tablet  Patient-reported dosage and instructions? 20mg  What question or problem do you have with the medication? med should be   sent to [facility]   Preferred Pharmacy? [id number] Saurabh Ang, [address] [facility],  [address] phone number (if available)? [phone]  Additional Information for Provider?   ---------------------------------------------------------------------------  --------------  CALL BACK INFO  What is the best way for the office to contact you? OK to leave message on   voicemail  Preferred Call Back Phone Number? [phone]  ---------------------------------------------------------------------------  --------------  SCRIPT ANSWERS  Relationship to Patient?  Self

## 2022-05-03 NOTE — TELEPHONE ENCOUNTER
----- Message from Madelyn Robbins sent at 5/3/2022  1:18 PM EDT -----  Subject: Medication Problem    QUESTIONS  Name of Medication? predniSONE (DELTASONE) 20 MG tablet  Patient-reported dosage and instructions? 20mg  What question or problem do you have with the medication? med should be   sent to [facility]   Preferred Pharmacy? [id number] Cris Herbert, [address] [facility],  [address] phone number (if available)? [phone]  Additional Information for Provider?   ---------------------------------------------------------------------------  --------------  CALL BACK INFO  What is the best way for the office to contact you? OK to leave message on   voicemail  Preferred Call Back Phone Number? [phone]  ---------------------------------------------------------------------------  --------------  SCRIPT ANSWERS  Relationship to Patient?  Self

## 2022-05-04 DIAGNOSIS — R21 RASH: ICD-10-CM

## 2022-05-04 RX ORDER — DIPHENHYDRAMINE HCL 25 MG
25 TABLET ORAL EVERY 6 HOURS PRN
Qty: 20 TABLET | Refills: 0 | Status: SHIPPED | OUTPATIENT
Start: 2022-05-04

## 2022-05-09 ENCOUNTER — TELEPHONE (OUTPATIENT)
Dept: PRIMARY CARE CLINIC | Age: 27
End: 2022-05-09

## 2022-05-09 NOTE — TELEPHONE ENCOUNTER
----- Message from Kale Pena sent at 5/9/2022  1:22 PM EDT -----  Subject: Referral Request    QUESTIONS   Reason for referral request? Pt would like to be seen by Dermatology for   the ringworm on both of his feet as well for a really itchy rash all over   his arms , legs, and hands   Has the physician seen you for this condition before? Yes  Select a date? 2022-04-20  Select the Provider the patient wants to be referred to, if known (PCP or   Specialist)? Outside Physician - Dermatology   Preferred Specialist (if applicable)? Do you already have an appointment scheduled? No  Additional Information for Provider?   ---------------------------------------------------------------------------  --------------  CALL BACK INFO  What is the best way for the office to contact you? OK to leave message on   voicemail  Preferred Call Back Phone Number? [phone]  ---------------------------------------------------------------------------  --------------  SCRIPT ANSWERS  Relationship to Patient?  Self

## 2022-05-10 ENCOUNTER — OFFICE VISIT (OUTPATIENT)
Dept: PRIMARY CARE CLINIC | Age: 27
End: 2022-05-10
Payer: COMMERCIAL

## 2022-05-10 VITALS
TEMPERATURE: 97.6 F | HEIGHT: 69 IN | BODY MASS INDEX: 36.43 KG/M2 | SYSTOLIC BLOOD PRESSURE: 122 MMHG | OXYGEN SATURATION: 97 % | HEART RATE: 88 BPM | DIASTOLIC BLOOD PRESSURE: 74 MMHG | WEIGHT: 246 LBS | RESPIRATION RATE: 18 BRPM

## 2022-05-10 DIAGNOSIS — L23.9 ALLERGIC CONTACT DERMATITIS, UNSPECIFIED TRIGGER: ICD-10-CM

## 2022-05-10 DIAGNOSIS — B35.3 TINEA PEDIS OF BOTH FEET: Primary | ICD-10-CM

## 2022-05-10 PROCEDURE — 99214 OFFICE O/P EST MOD 30 MIN: CPT | Performed by: INTERNAL MEDICINE

## 2022-05-10 RX ORDER — CLOBETASOL PROPIONATE 0.5 MG/G
OINTMENT TOPICAL
Qty: 1 EACH | Refills: 1 | Status: SHIPPED | OUTPATIENT
Start: 2022-05-10

## 2022-05-10 RX ORDER — DIPHENHYDRAMINE HYDROCHLORIDE 25 MG/1
CAPSULE ORAL
COMMUNITY
Start: 2022-05-04

## 2022-05-10 RX ORDER — PREDNISONE 20 MG/1
40 TABLET ORAL DAILY
Qty: 20 TABLET | Refills: 0 | Status: SHIPPED | OUTPATIENT
Start: 2022-05-10 | End: 2022-05-20

## 2022-05-10 ASSESSMENT — ENCOUNTER SYMPTOMS
COLOR CHANGE: 1
WHEEZING: 0
NAUSEA: 0
VOMITING: 0
DIARRHEA: 0
ABDOMINAL PAIN: 0
SHORTNESS OF BREATH: 0
COUGH: 0

## 2022-05-10 NOTE — PROGRESS NOTES
Subjective:      Patient ID: Clary Pierre is a 33 y.o. male    Rash f/u  HPI  Pt presents for follow up regarding tinea pedis treatment. Rash initially resolved with Lotrisone. However he developed another itchy rash on the arms and thighs afterwards. Denies new skin care products, detergent or clothing. Yet to make derm appt from last visit. Past Medical History:   Diagnosis Date    CAD (coronary artery disease)     murmur    Chronic pain     Hypertension     Low back pain      Past Surgical History:   Procedure Laterality Date    EYE SURGERY      laser    UPPER GASTROINTESTINAL ENDOSCOPY N/A 3/25/2019    EGD ESOPHAGOGASTRODUODENOSCOPY WITH DILATION performed by Kenzie Ybarra MD [address]      x2     Social History     Socioeconomic History    Marital status: Single     Spouse name: Not on file    Number of children: Not on file    Years of education: Not on file    Highest education level: Not on file   Occupational History    Not on file   Tobacco Use    Smoking status: Current Some Day Smoker     Years: 1.00     Types: Cigars    Smokeless tobacco: Never Used   Substance and Sexual Activity    Alcohol use: Yes     Comment: occassional    Drug use: No    Sexual activity: Yes     Partners: Female   Other Topics Concern    Not on file   Social History Narrative    Not on file     Social Determinants of Health     Financial Resource Strain: Low Risk     Difficulty of Paying Living Expenses: Not hard at all   Food Insecurity: No Food Insecurity    Worried About [address] in the Last Year: Never true    [address] [facility] in the Last Year: Never true   Transportation Needs:     Lack of Transportation (Medical): Not on file    Lack of Transportation (Non-Medical):  Not on file   Physical Activity:     Days of Exercise per Week: Not on file    Minutes of Exercise per Session: Not on file   Stress:     Feeling of Stress : Not on file   Social Connections:     Frequency of Communication with Friends and Family: Not on file    Frequency of Social Gatherings with Friends and Family: Not on file    Attends Restorationist Services: Not on file    Active Member of [address] [place] [address] or Organizations: Not on file    Attends Club or Organization Meetings: Not on file    Marital Status: Not on file   Intimate Partner Violence:     Fear of Current or Ex-Partner: Not on file    Emotionally Abused: Not on file    Physically Abused: Not on file    Sexually Abused: Not on file   Housing Stability:     Unable to Pay for Housing in the Last Year: Not on file    Number of [place] in the Last Year: Not on file    Unstable Housing in the Last Year: Not on file     Family History   Problem Relation Age of Onset    Heart Disease Mother     Hypertension Mother     Stroke Mother     Heart Disease Father     Hypertension Father     Stroke Father    Allergies:  Patient has no known allergies. Patient Active Problem List   Diagnosis    Development delay    Dyspepsia    Dysphagia     Current Outpatient Medications on File Prior to Visit   Medication Sig Dispense Refill    BANOPHEN 25 MG capsule TAKE 1 CAPSULE BY MOUTH EVERY 6 HOURS AS NEEDED FOR ITCHING.       diphenhydrAMINE (BENADRYL) 25 MG tablet Take 1 tablet by mouth every 6 hours as needed for Itching 20 tablet 0    omeprazole (PRILOSEC) 40 MG delayed release capsule TAKE 1 CAPSULE BY MOUTH EVERY DAY 60 capsule 3    sennosides-docusate sodium (SENOKOT-S) 8.6-50 MG tablet Take 2 tablets by mouth daily as needed for Constipation 60 tablet 1    lisinopril (PRINIVIL;ZESTRIL) 10 MG tablet Take 1 tablet by mouth daily 90 tablet 3    albuterol sulfate  (90 Base) MCG/ACT inhaler INHALE 2 PUFFS BY MOUTH EVERY 4 HOURS AS NEEDED      propranolol (INDERAL LA) 120 MG extended release capsule TAKE 1 CAPSULE BY MOUTH DAILY 90 capsule 3    amitriptyline (ELAVIL) 10 MG tablet Take by mouth       No current facility-administered medications on file prior to visit. Review of Systems   Constitutional: Negative for chills. HENT: Negative for sneezing. Respiratory: Negative for cough, shortness of breath and wheezing. Cardiovascular: Negative for chest pain. Gastrointestinal: Negative for abdominal pain, diarrhea, nausea and vomiting. Endocrine: Negative for cold intolerance and heat intolerance. Genitourinary: Negative for dysuria and frequency. Skin: Positive for color change and rash. Neurological: Negative for dizziness and light-headedness. Objective:   /74   Pulse 88   Temp 97.6 °F (36.4 °C)   Resp 18   Ht 5' 9\" (1.753 m)   Wt 246 lb (111.6 kg)   SpO2 97%   BMI 36.33 kg/m²     Physical Exam  Constitutional:       General: He is not in acute distress. Appearance: He is not diaphoretic. Cardiovascular:      Rate and Rhythm: Normal rate and regular rhythm. Pulses: Normal pulses. Heart sounds: Normal heart sounds, S1 normal and S2 normal.   Pulmonary:      Effort: Pulmonary effort is normal. No respiratory distress. Breath sounds: Normal breath sounds. No wheezing or rales. Chest:      Chest wall: No tenderness. Abdominal:      General: Bowel sounds are normal.      Tenderness: There is no abdominal tenderness. Skin:     Comments: B/l anterior thigh, forearm and arm erythematous papules   Neurological:      Mental Status: He is alert. Assessment:       Diagnosis Orders   1. Tinea pedis of both feet Improving clobetasol (TEMOVATE) 0.05 % ointment    ROSA Bates DO, [facility]   2. Allergic contact dermatitis, unspecified trigger Inadequately Controlled predniSONE (DELTASONE) 20 MG tablet    ROSA Bates DO, Aida Kirkpatrick    ??  cause        Plan:      Orders Placed This Encounter   Procedures    ROSA Bates DO, [facility] Kirkpatrick     Referral Priority:   Routine     Referral Type:   Eval and Treat     Referral Reason:   Specialty Services Required     Referred to Provider:   Shauna Ku DO     Requested Specialty:   Dermatology     Number of Visits Requested:   1     Orders Placed This Encounter   Medications    clobetasol (TEMOVATE) 0.05 % ointment     Sig: Apply topically 2 times daily for 2 weeks     Dispense:  1 each     Refill:  1    predniSONE (DELTASONE) 20 MG tablet     Sig: Take 2 tablets by mouth daily for 10 days     Dispense:  20 tablet     Refill:  0     Return for follow up, with PCP.

## 2022-06-18 DIAGNOSIS — G43.019 INTRACTABLE MIGRAINE WITHOUT AURA AND WITHOUT STATUS MIGRAINOSUS: ICD-10-CM

## 2022-06-18 DIAGNOSIS — K59.00 CONSTIPATION, UNSPECIFIED CONSTIPATION TYPE: ICD-10-CM

## 2022-06-19 RX ORDER — PROPRANOLOL HYDROCHLORIDE 120 MG/1
CAPSULE, EXTENDED RELEASE ORAL
Qty: 90 CAPSULE | Refills: 2 | Status: SHIPPED | OUTPATIENT
Start: 2022-06-19

## 2022-06-19 RX ORDER — INCONTINENCE PAD,LINER,DISP
EACH MISCELLANEOUS
Qty: 60 TABLET | Refills: 1 | Status: SHIPPED | OUTPATIENT
Start: 2022-06-19

## 2022-09-01 DIAGNOSIS — K21.9 GASTROESOPHAGEAL REFLUX DISEASE WITHOUT ESOPHAGITIS: ICD-10-CM

## 2022-09-01 DIAGNOSIS — I10 HYPERTENSION, UNSPECIFIED TYPE: ICD-10-CM

## 2022-09-05 RX ORDER — OMEPRAZOLE 40 MG/1
CAPSULE, DELAYED RELEASE ORAL
Qty: 90 CAPSULE | Refills: 2 | Status: SHIPPED | OUTPATIENT
Start: 2022-09-05

## 2022-09-05 RX ORDER — LISINOPRIL 10 MG/1
TABLET ORAL
Qty: 90 TABLET | Refills: 3 | Status: SHIPPED | OUTPATIENT
Start: 2022-09-05

## 2023-01-12 ENCOUNTER — OFFICE VISIT (OUTPATIENT)
Dept: PRIMARY CARE CLINIC | Age: 28
End: 2023-01-12

## 2023-01-12 VITALS
HEIGHT: 69 IN | DIASTOLIC BLOOD PRESSURE: 68 MMHG | OXYGEN SATURATION: 99 % | SYSTOLIC BLOOD PRESSURE: 122 MMHG | TEMPERATURE: 97.7 F | HEART RATE: 86 BPM | WEIGHT: 243.4 LBS | BODY MASS INDEX: 36.05 KG/M2 | RESPIRATION RATE: 18 BRPM

## 2023-01-12 DIAGNOSIS — I10 HYPERTENSION, UNSPECIFIED TYPE: Primary | ICD-10-CM

## 2023-01-12 DIAGNOSIS — G44.209 TENSION HEADACHE: ICD-10-CM

## 2023-01-12 SDOH — ECONOMIC STABILITY: FOOD INSECURITY: WITHIN THE PAST 12 MONTHS, YOU WORRIED THAT YOUR FOOD WOULD RUN OUT BEFORE YOU GOT MONEY TO BUY MORE.: NEVER TRUE

## 2023-01-12 SDOH — ECONOMIC STABILITY: FOOD INSECURITY: WITHIN THE PAST 12 MONTHS, THE FOOD YOU BOUGHT JUST DIDN'T LAST AND YOU DIDN'T HAVE MONEY TO GET MORE.: NEVER TRUE

## 2023-01-12 SDOH — ECONOMIC STABILITY: TRANSPORTATION INSECURITY
IN THE PAST 12 MONTHS, HAS LACK OF TRANSPORTATION KEPT YOU FROM MEETINGS, WORK, OR FROM GETTING THINGS NEEDED FOR DAILY LIVING?: NO

## 2023-01-12 SDOH — ECONOMIC STABILITY: TRANSPORTATION INSECURITY
IN THE PAST 12 MONTHS, HAS THE LACK OF TRANSPORTATION KEPT YOU FROM MEDICAL APPOINTMENTS OR FROM GETTING MEDICATIONS?: NO

## 2023-01-12 ASSESSMENT — PATIENT HEALTH QUESTIONNAIRE - PHQ9
SUM OF ALL RESPONSES TO PHQ QUESTIONS 1-9: 0
SUM OF ALL RESPONSES TO PHQ QUESTIONS 1-9: 0
3. TROUBLE FALLING OR STAYING ASLEEP: 0
7. TROUBLE CONCENTRATING ON THINGS, SUCH AS READING THE NEWSPAPER OR WATCHING TELEVISION: 0
2. FEELING DOWN, DEPRESSED OR HOPELESS: 0
SUM OF ALL RESPONSES TO PHQ9 QUESTIONS 1 & 2: 0
10. IF YOU CHECKED OFF ANY PROBLEMS, HOW DIFFICULT HAVE THESE PROBLEMS MADE IT FOR YOU TO DO YOUR WORK, TAKE CARE OF THINGS AT HOME, OR GET ALONG WITH OTHER PEOPLE: 0
5. POOR APPETITE OR OVEREATING: 0
6. FEELING BAD ABOUT YOURSELF - OR THAT YOU ARE A FAILURE OR HAVE LET YOURSELF OR YOUR FAMILY DOWN: 0
1. LITTLE INTEREST OR PLEASURE IN DOING THINGS: 0
SUM OF ALL RESPONSES TO PHQ QUESTIONS 1-9: 0
8. MOVING OR SPEAKING SO SLOWLY THAT OTHER PEOPLE COULD HAVE NOTICED. OR THE OPPOSITE, BEING SO FIGETY OR RESTLESS THAT YOU HAVE BEEN MOVING AROUND A LOT MORE THAN USUAL: 0
9. THOUGHTS THAT YOU WOULD BE BETTER OFF DEAD, OR OF HURTING YOURSELF: 0
4. FEELING TIRED OR HAVING LITTLE ENERGY: 0
SUM OF ALL RESPONSES TO PHQ QUESTIONS 1-9: 0

## 2023-01-12 ASSESSMENT — ENCOUNTER SYMPTOMS
NAUSEA: 0
VOMITING: 0
ABDOMINAL PAIN: 0
COUGH: 0
SHORTNESS OF BREATH: 0
DIARRHEA: 0
WHEEZING: 0
RHINORRHEA: 0

## 2023-01-12 ASSESSMENT — COLUMBIA-SUICIDE SEVERITY RATING SCALE - C-SSRS
6. HAVE YOU EVER DONE ANYTHING, STARTED TO DO ANYTHING, OR PREPARED TO DO ANYTHING TO END YOUR LIFE?: NO
2. HAVE YOU ACTUALLY HAD ANY THOUGHTS OF KILLING YOURSELF?: NO
1. WITHIN THE PAST MONTH, HAVE YOU WISHED YOU WERE DEAD OR WISHED YOU COULD GO TO SLEEP AND NOT WAKE UP?: NO

## 2023-01-12 ASSESSMENT — SOCIAL DETERMINANTS OF HEALTH (SDOH): HOW HARD IS IT FOR YOU TO PAY FOR THE VERY BASICS LIKE FOOD, HOUSING, MEDICAL CARE, AND HEATING?: NOT HARD AT ALL

## 2023-01-12 NOTE — PROGRESS NOTES
Subjective:      Patient ID: Sheree Bean is a 33 y.o. male      Hypertension f/u  HPI  Pt presents for follow up regarding treatment of hypertension. Currently on Inderal and lisinopril with satisfactory BP control. No lightheadedness or GI side effects. No chest pain or SOB, no one-sided weakness or slurred speech. Blurry vision x 3 months. Occurs in the left eye intermittently, lasting a few seconds. No loss of vision, flashes of light or eye pain. Hx Lasik surgery. Claims eye exam 3 months ago was unremarkable. Claims neuro diagnosed tension headache, instead of migraine.    Past Medical History:   Diagnosis Date    CAD (coronary artery disease)     murmur    Chronic pain     Hypertension     Low back pain      Past Surgical History:   Procedure Laterality Date    EYE SURGERY      laser    UPPER GASTROINTESTINAL ENDOSCOPY N/A 3/25/2019    EGD ESOPHAGOGASTRODUODENOSCOPY WITH DILATION performed by Leonardo Fitch MD at [facility]      x2     Social History     Socioeconomic History    Marital status: Single     Spouse name: Not on file    Number of children: Not on file    Years of education: Not on file    Highest education level: Not on file   Occupational History    Not on file   Tobacco Use    Smoking status: Some Days     Types: Cigars    Smokeless tobacco: Never   Substance and Sexual Activity    Alcohol use: Yes     Comment: occassional    Drug use: No    Sexual activity: Yes     Partners: Female   Other Topics Concern    Not on file   Social History Narrative    Not on file     Social Determinants of Health     Financial Resource Strain: Low Risk     Difficulty of Paying Living Expenses: Not hard at all   Food Insecurity: No Food Insecurity    Worried About [facility] in the Last Year: Never true    [address] in the Last Year: Never true   Transportation Needs: No Transportation Needs    Lack of Transportation (Medical): No    Lack of Transportation (Non-Medical): No   Physical Activity: Not on file   Stress: Not on file   Social Connections: Not on file   Intimate Partner Violence: Not on file   Housing Stability: Not on file     Family History   Problem Relation Age of Onset    Heart Disease Mother     Hypertension Mother     Stroke Mother     Heart Disease Father     Hypertension Father     Stroke Father      Allergies:  Patient has no known allergies. Patient Active Problem List   Diagnosis    Development delay    Dyspepsia    Dysphagia     Current Outpatient Medications on File Prior to Visit   Medication Sig Dispense Refill    omeprazole (PRILOSEC) 40 MG delayed release capsule TAKE 1 CAPSULE BY MOUTH EVERY DAY 90 capsule 2    lisinopril (PRINIVIL;ZESTRIL) 10 MG tablet TAKE 1 TABLET BY MOUTH EVERY DAY 90 tablet 3    propranolol (INDERAL LA) 120 MG extended release capsule TAKE 1 CAPSULE BY MOUTH EVERY DAY 90 capsule 2    [facility] SENNA PLUS 8.6-50 MG per tablet TAKE 2 TABLETS BY MOUTH DAILY AS NEEDED FOR CONSTIPATION. 60 tablet 1    amitriptyline (ELAVIL) 10 MG tablet Take 10 mg by mouth 3 times daily       No current facility-administered medications on file prior to visit. Review of Systems   Constitutional:  Negative for chills, diaphoresis, fatigue and fever. HENT:  Negative for congestion, ear discharge, ear pain and rhinorrhea. Respiratory:  Negative for cough, shortness of breath and wheezing. Cardiovascular:  Negative for chest pain. Gastrointestinal:  Negative for abdominal pain, diarrhea, nausea and vomiting. Endocrine: Negative for cold intolerance and heat intolerance. Genitourinary:  Negative for dysuria and frequency. Neurological:  Negative for dizziness and light-headedness.      Objective:   /68   Pulse 86   Temp 97.7 °F (36.5 °C)   Resp 18   Ht 5' 9\" (1.753 m)   Wt 243 lb 6.4 oz (110.4 kg)   SpO2 99%   BMI 35.94 kg/m²     Physical Exam  Constitutional:       General: He is not in acute distress. Appearance: He is not diaphoretic. Eyes:      General:         Right eye: No discharge. Left eye: No discharge. Extraocular Movements: Extraocular movements intact. Pupils: Pupils are equal, round, and reactive to light. Comments: Visual acuity 20/20 b/l   Cardiovascular:      Rate and Rhythm: Normal rate and regular rhythm. Heart sounds: Normal heart sounds, S1 normal and S2 normal.   Pulmonary:      Effort: Pulmonary effort is normal. No respiratory distress. Breath sounds: Normal breath sounds. No wheezing or rales. Chest:      Chest wall: No tenderness. Abdominal:      Tenderness: There is no abdominal tenderness. Neurological:      Mental Status: He is alert. Assessment:       Diagnosis Orders   1. Hypertension, unspecified type Controlled       2. Tension headache Controlled           Plan:      No orders of the defined types were placed in this encounter. Return in about 6 months (around 7/12/2023) for follow up, with PCP.

## 2023-02-23 VITALS
RESPIRATION RATE: 18 BRPM | SYSTOLIC BLOOD PRESSURE: 131 MMHG | OXYGEN SATURATION: 97 % | DIASTOLIC BLOOD PRESSURE: 71 MMHG | BODY MASS INDEX: 34.8 KG/M2 | HEIGHT: 69 IN | HEART RATE: 63 BPM | WEIGHT: 235 LBS

## 2023-06-17 DIAGNOSIS — K21.9 GASTROESOPHAGEAL REFLUX DISEASE WITHOUT ESOPHAGITIS: ICD-10-CM

## 2023-06-17 DIAGNOSIS — G43.019 INTRACTABLE MIGRAINE WITHOUT AURA AND WITHOUT STATUS MIGRAINOSUS: ICD-10-CM

## 2023-06-19 NOTE — TELEPHONE ENCOUNTER
Comments: This request is coming from the pharmacy. Last Office Visit (last PCP visit):   1/12/2023    Next Visit Date:  Future Appointments   Date Time Provider Anahy Valverde   7/13/2023  9:30 AM Michael Mix MD [facility]       If hasn't been seen in over a year OR hasn't followed up according to last diabetes/ADHD visit, make appointment for patient before sending refill to provider.     Rx requested:  Requested Prescriptions     Pending Prescriptions Disp Refills    omeprazole (PRILOSEC) 40 MG delayed release capsule [Pharmacy Med Name: OMEPRAZOLE DR 40 MG CAPSULE] 90 capsule 2     Sig: TAKE 1 CAPSULE BY MOUTH EVERY DAY    propranolol (INDERAL LA) 120 MG extended release capsule [Pharmacy Med Name: PROPRANOLOL  MG CAPSULE] 90 capsule 2     Sig: TAKE 1 CAPSULE BY MOUTH EVERY DAY

## 2023-06-20 RX ORDER — OMEPRAZOLE 40 MG/1
CAPSULE, DELAYED RELEASE ORAL
Qty: 90 CAPSULE | Refills: 2 | Status: SHIPPED | OUTPATIENT
Start: 2023-06-20

## 2023-06-20 RX ORDER — PROPRANOLOL HYDROCHLORIDE 120 MG/1
CAPSULE, EXTENDED RELEASE ORAL
Qty: 90 CAPSULE | Refills: 2 | Status: SHIPPED | OUTPATIENT
Start: 2023-06-20

## 2023-07-13 ENCOUNTER — OFFICE VISIT (OUTPATIENT)
Dept: PRIMARY CARE CLINIC | Age: 28
End: 2023-07-13
Payer: COMMERCIAL

## 2023-07-13 VITALS
HEIGHT: 69 IN | DIASTOLIC BLOOD PRESSURE: 78 MMHG | WEIGHT: 242 LBS | HEART RATE: 82 BPM | BODY MASS INDEX: 35.84 KG/M2 | TEMPERATURE: 98 F | OXYGEN SATURATION: 98 % | RESPIRATION RATE: 18 BRPM | SYSTOLIC BLOOD PRESSURE: 118 MMHG

## 2023-07-13 DIAGNOSIS — E66.01 SEVERE OBESITY (BMI 35.0-39.9) WITH COMORBIDITY (HCC): ICD-10-CM

## 2023-07-13 DIAGNOSIS — K21.9 GASTROESOPHAGEAL REFLUX DISEASE WITHOUT ESOPHAGITIS: ICD-10-CM

## 2023-07-13 DIAGNOSIS — I10 HYPERTENSION, UNSPECIFIED TYPE: ICD-10-CM

## 2023-07-13 DIAGNOSIS — I10 HYPERTENSION, UNSPECIFIED TYPE: Primary | ICD-10-CM

## 2023-07-13 LAB
ALBUMIN SERPL-MCNC: 5 G/DL (ref 3.5–4.6)
ALP SERPL-CCNC: 110 U/L (ref 35–104)
ALT SERPL-CCNC: 27 U/L (ref 0–41)
ANION GAP SERPL CALCULATED.3IONS-SCNC: 14 MEQ/L (ref 9–15)
AST SERPL-CCNC: 32 U/L (ref 0–40)
BASOPHILS # BLD: 0 K/UL (ref 0–0.2)
BASOPHILS NFR BLD: 0.5 %
BILIRUB SERPL-MCNC: <0.2 MG/DL (ref 0.2–0.7)
BUN SERPL-MCNC: 7 MG/DL (ref 6–20)
CALCIUM SERPL-MCNC: 9.6 MG/DL (ref 8.5–9.9)
CHLORIDE SERPL-SCNC: 99 MEQ/L (ref 95–107)
CO2 SERPL-SCNC: 25 MEQ/L (ref 20–31)
CREAT SERPL-MCNC: 0.8 MG/DL (ref 0.7–1.2)
EOSINOPHIL # BLD: 0.2 K/UL (ref 0–0.7)
EOSINOPHIL NFR BLD: 2.2 %
ERYTHROCYTE [DISTWIDTH] IN BLOOD BY AUTOMATED COUNT: 13.2 % (ref 11.5–14.5)
GLOBULIN SER CALC-MCNC: 2.8 G/DL (ref 2.3–3.5)
GLUCOSE SERPL-MCNC: 105 MG/DL (ref 70–99)
HCT VFR BLD AUTO: 45.7 % (ref 42–52)
HGB BLD-MCNC: 15.4 G/DL (ref 14–18)
LYMPHOCYTES # BLD: 1.9 K/UL (ref 1–4.8)
LYMPHOCYTES NFR BLD: 27.7 %
MCH RBC QN AUTO: 29.9 PG (ref 27–31.3)
MCHC RBC AUTO-ENTMCNC: 33.6 % (ref 33–37)
MCV RBC AUTO: 88.8 FL (ref 79–92.2)
MONOCYTES # BLD: 0.7 K/UL (ref 0.2–0.8)
MONOCYTES NFR BLD: 9.9 %
NEUTROPHILS # BLD: 4.1 K/UL (ref 1.4–6.5)
NEUTS SEG NFR BLD: 59.7 %
PLATELET # BLD AUTO: 217 K/UL (ref 130–400)
POTASSIUM SERPL-SCNC: 4.6 MEQ/L (ref 3.4–4.9)
PROT SERPL-MCNC: 7.8 G/DL (ref 6.3–8)
RBC # BLD AUTO: 5.15 M/UL (ref 4.7–6.1)
SODIUM SERPL-SCNC: 138 MEQ/L (ref 135–144)
TSH REFLEX: 3.18 UIU/ML (ref 0.44–3.86)
WBC # BLD AUTO: 6.9 K/UL (ref 4.8–10.8)

## 2023-07-13 PROCEDURE — 99214 OFFICE O/P EST MOD 30 MIN: CPT | Performed by: INTERNAL MEDICINE

## 2023-07-13 PROCEDURE — 3078F DIAST BP <80 MM HG: CPT | Performed by: INTERNAL MEDICINE

## 2023-07-13 PROCEDURE — 3074F SYST BP LT 130 MM HG: CPT | Performed by: INTERNAL MEDICINE

## 2023-07-13 SDOH — ECONOMIC STABILITY: INCOME INSECURITY: HOW HARD IS IT FOR YOU TO PAY FOR THE VERY BASICS LIKE FOOD, HOUSING, MEDICAL CARE, AND HEATING?: NOT HARD AT ALL

## 2023-07-13 SDOH — ECONOMIC STABILITY: FOOD INSECURITY: WITHIN THE PAST 12 MONTHS, THE FOOD YOU BOUGHT JUST DIDN'T LAST AND YOU DIDN'T HAVE MONEY TO GET MORE.: NEVER TRUE

## 2023-07-13 SDOH — ECONOMIC STABILITY: FOOD INSECURITY: WITHIN THE PAST 12 MONTHS, YOU WORRIED THAT YOUR FOOD WOULD RUN OUT BEFORE YOU GOT MONEY TO BUY MORE.: NEVER TRUE

## 2023-07-13 SDOH — ECONOMIC STABILITY: HOUSING INSECURITY
IN THE LAST 12 MONTHS, WAS THERE A TIME WHEN YOU DID NOT HAVE A STEADY PLACE TO SLEEP OR SLEPT IN A SHELTER (INCLUDING NOW)?: NO

## 2023-07-13 ASSESSMENT — ENCOUNTER SYMPTOMS
DIARRHEA: 0
ABDOMINAL PAIN: 0
WHEEZING: 0
NAUSEA: 0
SHORTNESS OF BREATH: 0
VOMITING: 0
COUGH: 0

## 2023-07-13 NOTE — PROGRESS NOTES
Subjective:      Patient ID: Chauncey Brittle is a 33 y.o. male    Hypertension f/u  HPI  Pt presents for follow up regarding treatment of hypertension. Currently on Inderal and lisinopril with satisfactory BP control. No lightheadedness or GI side effects. No chest pain or SOB, no one-sided weakness or slurred speech. Weight loss recommended. Low fat/carb diet and 150minutes/week aerobic exercise recommended. Vitals 7/13/2023 1/12/2023 5/10/2022   Weight 242 lb 243 lb 6.4 oz 246 lb     Past Medical History:   Diagnosis Date    CAD (coronary artery disease)     murmur    Chronic pain     Hypertension     Low back pain      Past Surgical History:   Procedure Laterality Date    EYE SURGERY      laser    UPPER GASTROINTESTINAL ENDOSCOPY N/A 3/25/2019    EGD ESOPHAGOGASTRODUODENOSCOPY WITH DILATION performed by Steven Garrett MD at [address] [place] [address]      x2     Social History     Socioeconomic History    Marital status: Single     Spouse name: Not on file    Number of children: Not on file    Years of education: Not on file    Highest education level: Not on file   Occupational History    Not on file   Tobacco Use    Smoking status: Some Days     Types: Cigars    Smokeless tobacco: Never   Substance and Sexual Activity    Alcohol use: Yes     Comment: occassional    Drug use: No    Sexual activity: Yes     Partners: Female   Other Topics Concern    Not on file   Social History Narrative    Not on file     Social Determinants of Health     Financial Resource Strain: Low Risk     Difficulty of Paying Living Expenses: Not hard at all   Food Insecurity: No Food Insecurity    Worried About Running Out of Food in the Last Year: Never true    [address] in the Last Year: Never true   Transportation Needs: No Transportation Needs    Lack of Transportation (Medical): No    Lack of Transportation (Non-Medical):  No   Physical Activity: Not on file   Stress: Not on file   Social

## 2023-07-16 DIAGNOSIS — R74.8 ALKALINE PHOSPHATASE ELEVATION: Primary | ICD-10-CM

## 2023-08-01 ENCOUNTER — OFFICE VISIT (OUTPATIENT)
Dept: PRIMARY CARE CLINIC | Age: 28
End: 2023-08-01
Payer: COMMERCIAL

## 2023-08-01 VITALS
DIASTOLIC BLOOD PRESSURE: 72 MMHG | HEIGHT: 69 IN | HEART RATE: 73 BPM | TEMPERATURE: 98.4 F | RESPIRATION RATE: 18 BRPM | WEIGHT: 243 LBS | BODY MASS INDEX: 35.99 KG/M2 | OXYGEN SATURATION: 99 % | SYSTOLIC BLOOD PRESSURE: 122 MMHG

## 2023-08-01 DIAGNOSIS — Z20.2 EXPOSURE TO STD: ICD-10-CM

## 2023-08-01 DIAGNOSIS — Z11.3 SCREEN FOR STD (SEXUALLY TRANSMITTED DISEASE): Primary | ICD-10-CM

## 2023-08-01 DIAGNOSIS — R74.8 ALKALINE PHOSPHATASE ELEVATION: ICD-10-CM

## 2023-08-01 PROCEDURE — 99213 OFFICE O/P EST LOW 20 MIN: CPT | Performed by: INTERNAL MEDICINE

## 2023-08-01 ASSESSMENT — ENCOUNTER SYMPTOMS
VOMITING: 0
SHORTNESS OF BREATH: 0
ABDOMINAL PAIN: 0
WHEEZING: 0
NAUSEA: 0
COUGH: 0
DIARRHEA: 0

## 2023-08-01 NOTE — PROGRESS NOTES
Subjective:      Patient ID: Rochelle Khan is a 33 y.o. male    STD screen  Lab f/u  HPI  Pt presents for follow up regarding elevated alk phos. Await repeat lab with [facility]GT. No itching or skin color change    Wants STD screening. Denies known exposure. NO genital ulcers, rash or itch. Past Medical History:   Diagnosis Date    CAD (coronary artery disease)     murmur    Chronic pain     Hypertension     Low back pain      Past Surgical History:   Procedure Laterality Date    EYE SURGERY      laser    UPPER GASTROINTESTINAL ENDOSCOPY N/A 3/25/2019    EGD ESOPHAGOGASTRODUODENOSCOPY WITH DILATION performed by Mariana Larkin MD at [address] [place] [address]      x2     Social History     Socioeconomic History    Marital status: Single     Spouse name: Not on file    Number of children: Not on file    Years of education: Not on file    Highest education level: Not on file   Occupational History    Not on file   Tobacco Use    Smoking status: Some Days     Types: Cigars    Smokeless tobacco: Never   Substance and Sexual Activity    Alcohol use: Yes     Comment: occassional    Drug use: No    Sexual activity: Yes     Partners: Female   Other Topics Concern    Not on file   Social History Narrative    Not on file     Social Determinants of Health     Financial Resource Strain: Low Risk     Difficulty of Paying Living Expenses: Not hard at all   Food Insecurity: No Food Insecurity    Worried About [place] in the Last Year: Never true    [address] in the Last Year: Never true   Transportation Needs: No Transportation Needs    Lack of Transportation (Medical): No    Lack of Transportation (Non-Medical):  No   Physical Activity: Not on file   Stress: Not on file   Social Connections: Not on file   Intimate Partner Violence: Not on file   Housing Stability: Unknown    Unable to Pay for Housing in the Last Year: Not on file    Number of Places Lived in the Last Year: Not on file

## 2023-08-02 LAB
HIV AG/AB: NONREACTIVE
RPR SER QL: NORMAL

## 2023-08-04 LAB
HSV1+2 IGG SER IA-ACNC: 0.43 IV
HSV1+2 IGM SER IA-ACNC: 0.34 IV

## 2023-08-06 LAB
C TRACH DNA UR QL NAA+PROBE: NEGATIVE
N GONORRHOEA DNA UR QL NAA+PROBE: NEGATIVE

## 2023-08-07 DIAGNOSIS — I10 HYPERTENSION, UNSPECIFIED TYPE: ICD-10-CM

## 2023-08-08 ENCOUNTER — TELEPHONE (OUTPATIENT)
Dept: PRIMARY CARE CLINIC | Age: 28
End: 2023-08-08

## 2023-08-08 RX ORDER — LISINOPRIL 10 MG/1
TABLET ORAL
Qty: 90 TABLET | Refills: 3 | Status: SHIPPED | OUTPATIENT
Start: 2023-08-08

## 2023-08-29 DIAGNOSIS — R74.8 ALKALINE PHOSPHATASE ELEVATION: ICD-10-CM

## 2023-08-29 LAB
ALBUMIN SERPL-MCNC: 4.9 G/DL (ref 3.5–4.6)
ALP SERPL-CCNC: 107 U/L (ref 35–104)
ALT SERPL-CCNC: 17 U/L (ref 0–41)
ANION GAP SERPL CALCULATED.3IONS-SCNC: 14 MEQ/L (ref 9–15)
AST SERPL-CCNC: 16 U/L (ref 0–40)
BILIRUB SERPL-MCNC: <0.2 MG/DL (ref 0.2–0.7)
BUN SERPL-MCNC: 13 MG/DL (ref 6–20)
CALCIUM SERPL-MCNC: 9.1 MG/DL (ref 8.5–9.9)
CHLORIDE SERPL-SCNC: 103 MEQ/L (ref 95–107)
CO2 SERPL-SCNC: 23 MEQ/L (ref 20–31)
CREAT SERPL-MCNC: 0.85 MG/DL (ref 0.7–1.2)
GLOBULIN SER CALC-MCNC: 2.2 G/DL (ref 2.3–3.5)
GLUCOSE SERPL-MCNC: 85 MG/DL (ref 70–99)
POTASSIUM SERPL-SCNC: 4.4 MEQ/L (ref 3.4–4.9)
PROT SERPL-MCNC: 7.1 G/DL (ref 6.3–8)
SODIUM SERPL-SCNC: 140 MEQ/L (ref 135–144)

## 2023-08-30 LAB — GGT, 20027: 35 U/L (ref 8–61)

## 2023-09-22 ENCOUNTER — OFFICE VISIT (OUTPATIENT)
Dept: PRIMARY CARE CLINIC | Age: 28
End: 2023-09-22
Payer: COMMERCIAL

## 2023-09-22 VITALS
HEIGHT: 69 IN | TEMPERATURE: 98.4 F | SYSTOLIC BLOOD PRESSURE: 122 MMHG | HEART RATE: 100 BPM | BODY MASS INDEX: 36.05 KG/M2 | OXYGEN SATURATION: 97 % | WEIGHT: 243.4 LBS | DIASTOLIC BLOOD PRESSURE: 72 MMHG

## 2023-09-22 DIAGNOSIS — J20.9 ACUTE BRONCHITIS, UNSPECIFIED ORGANISM: ICD-10-CM

## 2023-09-22 DIAGNOSIS — H10.33 ACUTE CONJUNCTIVITIS OF BOTH EYES, UNSPECIFIED ACUTE CONJUNCTIVITIS TYPE: ICD-10-CM

## 2023-09-22 DIAGNOSIS — H66.92 LEFT OTITIS MEDIA, UNSPECIFIED OTITIS MEDIA TYPE: Primary | ICD-10-CM

## 2023-09-22 LAB — S PYO AG THROAT QL: NORMAL

## 2023-09-22 PROCEDURE — 99214 OFFICE O/P EST MOD 30 MIN: CPT | Performed by: INTERNAL MEDICINE

## 2023-09-22 PROCEDURE — 87880 STREP A ASSAY W/OPTIC: CPT | Performed by: INTERNAL MEDICINE

## 2023-09-22 RX ORDER — AMOXICILLIN 500 MG/1
500 CAPSULE ORAL 2 TIMES DAILY
Qty: 14 CAPSULE | Refills: 0 | Status: SHIPPED | OUTPATIENT
Start: 2023-09-22 | End: 2023-09-29

## 2023-09-22 RX ORDER — CELECOXIB 200 MG/1
CAPSULE ORAL
COMMUNITY
Start: 2023-09-01

## 2023-09-22 RX ORDER — GUAIFENESIN AND CODEINE PHOSPHATE 100; 10 MG/5ML; MG/5ML
10 SOLUTION ORAL 2 TIMES DAILY PRN
Qty: 118 ML | Refills: 0 | Status: SHIPPED | OUTPATIENT
Start: 2023-09-22 | End: 2023-09-29

## 2023-09-22 RX ORDER — POLYMYXIN B SULFATE AND TRIMETHOPRIM 1; 10000 MG/ML; [USP'U]/ML
1 SOLUTION OPHTHALMIC EVERY 4 HOURS
Qty: 3 ML | Refills: 0 | Status: SHIPPED | OUTPATIENT
Start: 2023-09-22 | End: 2023-09-29

## 2023-09-22 NOTE — PROGRESS NOTES
Subjective:      Patient ID: Ava Saenz is a 33 y.o. male    Sore throat x 6 days   HPI  Pt presents with 6 days of sore throat and left ear pain. No fever or chills. Assoc nonproductive cough and chest congestion, no chest pain. No wheezing but no SOB. No generalized body pain. Assoc left ear pain, no discharge. Eye redness and crusty discharge- both eyes. No eye pain. Past Medical History:   Diagnosis Date    CAD (coronary artery disease)     murmur    Chronic pain     Hypertension     Low back pain      Past Surgical History:   Procedure Laterality Date    EYE SURGERY      laser    UPPER GASTROINTESTINAL ENDOSCOPY N/A 3/25/2019    EGD ESOPHAGOGASTRODUODENOSCOPY WITH DILATION performed by Pedro Sal MD at [address] [place] [address]      x2     Social History     Socioeconomic History    Marital status: Single     Spouse name: Not on file    Number of children: Not on file    Years of education: Not on file    Highest education level: Not on file   Occupational History    Not on file   Tobacco Use    Smoking status: Some Days     Types: Cigars    Smokeless tobacco: Never   Substance and Sexual Activity    Alcohol use: Yes     Comment: occassional    Drug use: No    Sexual activity: Yes     Partners: Female   Other Topics Concern    Not on file   Social History Narrative    Not on file     Social Determinants of Health     Financial Resource Strain: Low Risk  (7/13/2023)    Overall Financial Resource Strain (CARDIA)     Difficulty of Paying Living Expenses: Not hard at all   Food Insecurity: No Food Insecurity (7/13/2023)    Hunger Vital Sign     Worried About Running Out of Food in the Last Year: Never true     [address] in the Last Year: Never true   Transportation Needs: No Transportation Needs (7/13/2023)    PRAPARE - Transportation     Lack of Transportation (Medical): No     Lack of Transportation (Non-Medical):  No   Physical Activity: Not on file   Stress:

## 2023-09-23 ASSESSMENT — ENCOUNTER SYMPTOMS
WHEEZING: 0
NAUSEA: 0
SORE THROAT: 1
SINUS PRESSURE: 0
DIARRHEA: 0
SINUS PAIN: 0
SHORTNESS OF BREATH: 0
ABDOMINAL PAIN: 0
COUGH: 1
RHINORRHEA: 0
VOMITING: 0

## 2023-09-27 ENCOUNTER — OFFICE VISIT (OUTPATIENT)
Dept: PRIMARY CARE CLINIC | Age: 28
End: 2023-09-27
Payer: COMMERCIAL

## 2023-09-27 VITALS
RESPIRATION RATE: 18 BRPM | HEART RATE: 80 BPM | WEIGHT: 241 LBS | BODY MASS INDEX: 35.7 KG/M2 | SYSTOLIC BLOOD PRESSURE: 128 MMHG | OXYGEN SATURATION: 98 % | HEIGHT: 69 IN | TEMPERATURE: 98 F | DIASTOLIC BLOOD PRESSURE: 82 MMHG

## 2023-09-27 DIAGNOSIS — M54.9 MUSCULOSKELETAL BACK PAIN: Primary | ICD-10-CM

## 2023-09-27 PROCEDURE — 99213 OFFICE O/P EST LOW 20 MIN: CPT | Performed by: INTERNAL MEDICINE

## 2023-09-27 PROCEDURE — 96372 THER/PROPH/DIAG INJ SC/IM: CPT | Performed by: INTERNAL MEDICINE

## 2023-09-27 RX ORDER — PREDNISONE 20 MG/1
40 TABLET ORAL DAILY
Qty: 20 TABLET | Refills: 0 | Status: SHIPPED | OUTPATIENT
Start: 2023-09-27 | End: 2023-10-07

## 2023-09-27 RX ORDER — METHYLPREDNISOLONE ACETATE 40 MG/ML
60 INJECTION, SUSPENSION INTRA-ARTICULAR; INTRALESIONAL; INTRAMUSCULAR; SOFT TISSUE ONCE
Status: COMPLETED | OUTPATIENT
Start: 2023-09-27 | End: 2023-09-27

## 2023-09-27 RX ADMIN — METHYLPREDNISOLONE ACETATE 60 MG: 40 INJECTION, SUSPENSION INTRA-ARTICULAR; INTRALESIONAL; INTRAMUSCULAR; SOFT TISSUE at 12:14

## 2023-09-27 ASSESSMENT — ENCOUNTER SYMPTOMS
DIARRHEA: 0
SINUS PRESSURE: 0
WHEEZING: 0
ABDOMINAL PAIN: 0
SHORTNESS OF BREATH: 0
BACK PAIN: 1
RHINORRHEA: 0
VOMITING: 0
SORE THROAT: 0
COUGH: 0
NAUSEA: 0
SINUS PAIN: 0

## 2023-09-27 NOTE — PROGRESS NOTES
(Non-Medical): No   Physical Activity: Not on file   Stress: Not on file   Social Connections: Not on file   Intimate Partner Violence: Not on file   Housing Stability: Unknown (7/13/2023)    Housing Stability Vital Sign     Unable to Pay for Housing in the Last Year: Not on file     Number of Places Lived in the Last Year: Not on file     Unstable Housing in the Last Year: No     Family History   Problem Relation Age of Onset    Heart Disease Mother     Hypertension Mother     Stroke Mother     Heart Disease Father     Hypertension Father     Stroke Father      Allergies:  Patient has no known allergies. Patient Active Problem List   Diagnosis    Development delay    Dyspepsia    Dysphagia     Current Outpatient Medications on File Prior to Visit   Medication Sig Dispense Refill    trimethoprim-polymyxin b (POLYTRIM) 27571-2.1 UNIT/ML-% ophthalmic solution Place 1 drop into both eyes every 4 hours for 7 days 3 mL 0    lisinopril (PRINIVIL;ZESTRIL) 10 MG tablet TAKE 1 TABLET BY MOUTH EVERY DAY 90 tablet 3    omeprazole (PRILOSEC) 40 MG delayed release capsule TAKE 1 CAPSULE BY MOUTH EVERY DAY 90 capsule 2    propranolol (INDERAL LA) 120 MG extended release capsule TAKE 1 CAPSULE BY MOUTH EVERY DAY 90 capsule 2    [facility] SENNA PLUS 8.6-50 MG per tablet TAKE 2 TABLETS BY MOUTH DAILY AS NEEDED FOR CONSTIPATION. 60 tablet 1    amitriptyline (ELAVIL) 10 MG tablet Take 1 tablet by mouth 3 times daily      celecoxib (CELEBREX) 200 MG capsule TAKE 1 CAPSULE BY MOUTH EVERY DAY AS NEEDED (Patient not taking: Reported on 9/27/2023)       No current facility-administered medications on file prior to visit. Review of Systems   Constitutional:  Negative for chills, diaphoresis, fatigue and fever. HENT:  Negative for congestion, ear discharge, ear pain, rhinorrhea, sinus pressure, sinus pain, sneezing and sore throat. Respiratory:  Negative for cough, shortness of breath and wheezing.     Cardiovascular:  Negative for chest

## 2023-10-12 PROBLEM — N35.919 URETHRAL STRICTURE: Status: ACTIVE | Noted: 2023-10-12

## 2023-10-12 PROBLEM — M43.00 PARS DEFECT: Status: ACTIVE | Noted: 2023-10-12

## 2023-10-12 PROBLEM — R30.0 DYSURIA: Status: ACTIVE | Noted: 2023-10-12

## 2023-10-12 PROBLEM — M54.16 LUMBAR RADICULOPATHY: Status: ACTIVE | Noted: 2023-10-12

## 2023-10-12 PROBLEM — S43.431A SUPERIOR GLENOID LABRUM LESION OF RIGHT SHOULDER: Status: ACTIVE | Noted: 2023-10-12

## 2023-10-12 RX ORDER — AMITRIPTYLINE HYDROCHLORIDE 10 MG/1
3 TABLET, FILM COATED ORAL NIGHTLY
COMMUNITY
Start: 2021-07-15

## 2023-10-12 RX ORDER — PROPRANOLOL HYDROCHLORIDE 120 MG/1
120 CAPSULE, EXTENDED RELEASE ORAL DAILY
COMMUNITY
End: 2024-05-13 | Stop reason: WASHOUT

## 2023-10-12 RX ORDER — ALBUTEROL SULFATE 90 UG/1
2 AEROSOL, METERED RESPIRATORY (INHALATION) EVERY 4 HOURS PRN
COMMUNITY
Start: 2021-11-26 | End: 2024-05-13 | Stop reason: WASHOUT

## 2023-10-12 RX ORDER — OMEPRAZOLE 40 MG/1
CAPSULE, DELAYED RELEASE ORAL
COMMUNITY

## 2023-10-12 RX ORDER — LISINOPRIL 10 MG/1
TABLET ORAL
COMMUNITY

## 2023-10-13 ENCOUNTER — APPOINTMENT (OUTPATIENT)
Dept: ORTHOPEDIC SURGERY | Facility: CLINIC | Age: 28
End: 2023-10-13

## 2023-11-22 ENCOUNTER — APPOINTMENT (OUTPATIENT)
Dept: ORTHOPEDIC SURGERY | Facility: CLINIC | Age: 28
End: 2023-11-22
Payer: COMMERCIAL

## 2023-11-29 ENCOUNTER — OFFICE VISIT (OUTPATIENT)
Dept: ORTHOPEDIC SURGERY | Facility: CLINIC | Age: 28
End: 2023-11-29
Payer: COMMERCIAL

## 2023-11-29 DIAGNOSIS — M54.16 LUMBAR RADICULOPATHY: ICD-10-CM

## 2023-11-29 DIAGNOSIS — M62.830 BACK MUSCLE SPASM: ICD-10-CM

## 2023-11-29 DIAGNOSIS — M62.830 BACK MUSCLE SPASM: Primary | ICD-10-CM

## 2023-11-29 PROCEDURE — 99213 OFFICE O/P EST LOW 20 MIN: CPT | Performed by: PHYSICIAN ASSISTANT

## 2023-11-29 RX ORDER — CYCLOBENZAPRINE HCL 10 MG
10 TABLET ORAL 3 TIMES DAILY PRN
Qty: 30 TABLET | Refills: 0 | Status: SHIPPED | OUTPATIENT
Start: 2023-11-29 | End: 2024-05-28 | Stop reason: SDUPTHER

## 2023-11-29 RX ORDER — LIDOCAINE 50 MG/G
1 PATCH TOPICAL DAILY
Qty: 30 PATCH | Refills: 0 | Status: SHIPPED | OUTPATIENT
Start: 2023-11-29 | End: 2024-01-02

## 2023-11-29 RX ORDER — PREDNISONE 10 MG/1
TABLET ORAL
Qty: 20 TABLET | Refills: 0 | Status: SHIPPED | OUTPATIENT
Start: 2023-11-29

## 2023-11-29 NOTE — PROGRESS NOTES
Established patient of the practice and a prior lumbar surgery.  Last seen Dr. Fitch who ordered physical therapy.  Patient got sick and never went.  He is complaining of middle back pain.  No real radiculopathy paresthesia fever chills nausea vomiting night sweats.  No bowel or bladder complaints.  No trauma accidents or falls to cause it.    Physical exam: Well-nourished, well kept.  No lymphangitis or lymphadenopathy in the examined extremities.  Good perfusion to the extremities ×4.  Radial and dorsalis pedis pulses 2+.  Capillary refill to all 4 digits brisk.  No distal edema x 4.  Gait normal.  Can walk on heels and toes.  There is decreased range of motion flexion extension rotation thoracic lumbar spine tender spasms good strength no instability examination of the lower extremities reveals no point tenderness, swelling, or deformity.  Range of motion of the hips, knees, and ankles are full without crepitance, instability, or exacerbation of pain.  Strength is 5/5 throughout.  No redness, abrasions, or lesions on the lower extremities bilaterally.  Gross sensation intact to the lower extremity is bilaterally.  Affect normal.    We looked at last x-rays that were taken on last visit did not see any major abnormalities.  Report was reviewed as well    Assessment: This a patient basically with some thoracic lumbar pain strain muscle spasm.    Plan: We will rewrite for some physical therapy.  Will try little steroid muscle relaxants and he will follow-up with us as needed.

## 2023-12-22 DIAGNOSIS — M62.830 BACK MUSCLE SPASM: ICD-10-CM

## 2024-01-02 RX ORDER — LIDOCAINE 50 MG/G
PATCH TOPICAL
Qty: 90 PATCH | Refills: 1 | Status: SHIPPED | OUTPATIENT
Start: 2024-01-02

## 2024-03-03 DIAGNOSIS — K21.9 GASTROESOPHAGEAL REFLUX DISEASE WITHOUT ESOPHAGITIS: ICD-10-CM

## 2024-03-03 DIAGNOSIS — G43.019 INTRACTABLE MIGRAINE WITHOUT AURA AND WITHOUT STATUS MIGRAINOSUS: ICD-10-CM

## 2024-03-04 RX ORDER — OMEPRAZOLE 40 MG/1
CAPSULE, DELAYED RELEASE ORAL
Qty: 90 CAPSULE | Refills: 2 | Status: SHIPPED | OUTPATIENT
Start: 2024-03-04 | End: 2024-03-07 | Stop reason: SDUPTHER

## 2024-03-04 RX ORDER — PROPRANOLOL HYDROCHLORIDE 120 MG/1
CAPSULE, EXTENDED RELEASE ORAL
Qty: 90 CAPSULE | Refills: 2 | Status: SHIPPED | OUTPATIENT
Start: 2024-03-04 | End: 2024-03-07

## 2024-03-04 NOTE — TELEPHONE ENCOUNTER
Comments: lmtcb     Last Office Visit (last PCP visit):   9/27/2023    Next Visit Date:  No future appointments.    **If hasn't been seen in over a year OR hasn't followed up according to last diabetes/ADHD visit, make appointment for patient before sending refill to provider.    Rx requested:  Requested Prescriptions     Pending Prescriptions Disp Refills    omeprazole (PRILOSEC) 40 MG delayed release capsule [Pharmacy Med Name: OMEPRAZOLE DR 40 MG CAPSULE] 90 capsule 2     Sig: TAKE 1 CAPSULE BY MOUTH EVERY DAY    propranolol (INDERAL LA) 120 MG extended release capsule [Pharmacy Med Name: PROPRANOLOL  MG CAPSULE] 90 capsule 2     Sig: TAKE 1 CAPSULE BY MOUTH EVERY DAY

## 2024-03-07 ENCOUNTER — OFFICE VISIT (OUTPATIENT)
Dept: PRIMARY CARE CLINIC | Age: 29
End: 2024-03-07
Payer: COMMERCIAL

## 2024-03-07 VITALS
DIASTOLIC BLOOD PRESSURE: 68 MMHG | SYSTOLIC BLOOD PRESSURE: 112 MMHG | HEIGHT: 69 IN | BODY MASS INDEX: 34.66 KG/M2 | HEART RATE: 77 BPM | OXYGEN SATURATION: 98 % | WEIGHT: 234 LBS | RESPIRATION RATE: 16 BRPM

## 2024-03-07 DIAGNOSIS — K21.9 GASTROESOPHAGEAL REFLUX DISEASE WITHOUT ESOPHAGITIS: ICD-10-CM

## 2024-03-07 DIAGNOSIS — I10 HYPERTENSION, UNSPECIFIED TYPE: Primary | ICD-10-CM

## 2024-03-07 DIAGNOSIS — G43.019 INTRACTABLE MIGRAINE WITHOUT AURA AND WITHOUT STATUS MIGRAINOSUS: ICD-10-CM

## 2024-03-07 DIAGNOSIS — K59.00 CONSTIPATION, UNSPECIFIED CONSTIPATION TYPE: ICD-10-CM

## 2024-03-07 PROCEDURE — 99214 OFFICE O/P EST MOD 30 MIN: CPT | Performed by: INTERNAL MEDICINE

## 2024-03-07 PROCEDURE — 3078F DIAST BP <80 MM HG: CPT | Performed by: INTERNAL MEDICINE

## 2024-03-07 PROCEDURE — 3074F SYST BP LT 130 MM HG: CPT | Performed by: INTERNAL MEDICINE

## 2024-03-07 RX ORDER — LISINOPRIL 10 MG/1
10 TABLET ORAL DAILY
Qty: 90 TABLET | Refills: 3 | Status: SHIPPED | OUTPATIENT
Start: 2024-03-07 | End: 2024-03-07 | Stop reason: SDUPTHER

## 2024-03-07 RX ORDER — OMEPRAZOLE 40 MG/1
40 CAPSULE, DELAYED RELEASE ORAL DAILY PRN
Qty: 60 CAPSULE | Refills: 2 | Status: SHIPPED | OUTPATIENT
Start: 2024-03-07

## 2024-03-07 RX ORDER — PROPRANOLOL HYDROCHLORIDE 120 MG/1
CAPSULE, EXTENDED RELEASE ORAL DAILY
Status: CANCELLED | OUTPATIENT
Start: 2024-03-07

## 2024-03-07 RX ORDER — AMOXICILLIN 250 MG
CAPSULE ORAL
Qty: 60 TABLET | Refills: 1 | Status: SHIPPED | OUTPATIENT
Start: 2024-03-07

## 2024-03-07 RX ORDER — AMITRIPTYLINE HYDROCHLORIDE 10 MG/1
30 TABLET, FILM COATED ORAL NIGHTLY
Qty: 90 TABLET | Refills: 3 | Status: SHIPPED | OUTPATIENT
Start: 2024-03-07

## 2024-03-07 RX ORDER — LISINOPRIL 10 MG/1
10 TABLET ORAL DAILY
Qty: 90 TABLET | Refills: 3 | Status: SHIPPED | OUTPATIENT
Start: 2024-03-07

## 2024-03-07 ASSESSMENT — PATIENT HEALTH QUESTIONNAIRE - PHQ9
SUM OF ALL RESPONSES TO PHQ9 QUESTIONS 1 & 2: 0
SUM OF ALL RESPONSES TO PHQ QUESTIONS 1-9: 0
1. LITTLE INTEREST OR PLEASURE IN DOING THINGS: 0
2. FEELING DOWN, DEPRESSED OR HOPELESS: 0

## 2024-03-07 NOTE — PROGRESS NOTES
Subjective:      Patient ID: Joey Dick is a 29 y.o. male    Hypertension f/u  HPI  Pt presents for follow up regarding treatment of hypertension. Currently on lisinopril with satisfactory BP control. No lightheadedness or GI side effects. No chest pain or SOB, no one-sided weakness or slurred speech. Unsure of previous hypertension w/u(since childhood). Attests to high salt diet.      Migraine controlled after amitriptyline initiation. Never controlled on Inderal.       GERD controlled on PPI. Attests to coffee, spicy food and late night meals.   Past Medical History:   Diagnosis Date    CAD (coronary artery disease)     murmur    Chronic pain     Hypertension     Low back pain      Past Surgical History:   Procedure Laterality Date    EYE SURGERY      laser    UPPER GASTROINTESTINAL ENDOSCOPY N/A 3/25/2019    EGD ESOPHAGOGASTRODUODENOSCOPY WITH DILATION performed by Elvin Leroy MD at [facility]    URETHRAL STRICTURE DILATATION      x2     Social History     Socioeconomic History    Marital status: Single     Spouse name: Not on file    Number of children: Not on file    Years of education: Not on file    Highest education level: Not on file   Occupational History    Not on file   Tobacco Use    Smoking status: Some Days     Types: Cigars    Smokeless tobacco: Never   Substance and Sexual Activity    Alcohol use: Yes     Comment: occassional    Drug use: No    Sexual activity: Yes     Partners: Female   Other Topics Concern    Not on file   Social History Narrative    Not on file     Social Determinants of Health     Financial Resource Strain: Low Risk  (7/13/2023)    Overall Financial Resource Strain (CARDIA)     Difficulty of Paying Living Expenses: Not hard at all   Food Insecurity: Not on file (7/13/2023)   Transportation Needs: Unknown (7/13/2023)    PRAPARE - Transportation     Lack of Transportation (Medical): Not on file     Lack of Transportation (Non-Medical): No   Physical Activity:

## 2024-03-08 ASSESSMENT — ENCOUNTER SYMPTOMS
SHORTNESS OF BREATH: 0
VOMITING: 0
DIARRHEA: 0
COUGH: 0
RHINORRHEA: 0
SORE THROAT: 0
SINUS PAIN: 0
WHEEZING: 0
SINUS PRESSURE: 0
ABDOMINAL PAIN: 0
NAUSEA: 0

## 2024-03-14 ENCOUNTER — HOSPITAL ENCOUNTER (OUTPATIENT)
Dept: RADIOLOGY | Facility: CLINIC | Age: 29
Discharge: HOME | End: 2024-03-14
Payer: COMMERCIAL

## 2024-03-14 ENCOUNTER — OFFICE VISIT (OUTPATIENT)
Dept: ORTHOPEDIC SURGERY | Facility: CLINIC | Age: 29
End: 2024-03-14
Payer: COMMERCIAL

## 2024-03-14 VITALS — WEIGHT: 240 LBS | BODY MASS INDEX: 35.55 KG/M2 | HEIGHT: 69 IN

## 2024-03-14 DIAGNOSIS — M25.511 ACUTE PAIN OF RIGHT SHOULDER: ICD-10-CM

## 2024-03-14 DIAGNOSIS — S46.111S LABRAL TEAR OF LONG HEAD OF BICEPS TENDON, RIGHT, SEQUELA: Primary | ICD-10-CM

## 2024-03-14 PROCEDURE — 73030 X-RAY EXAM OF SHOULDER: CPT | Mod: RT

## 2024-03-14 PROCEDURE — 73030 X-RAY EXAM OF SHOULDER: CPT | Mod: RIGHT SIDE | Performed by: ORTHOPAEDIC SURGERY

## 2024-03-14 PROCEDURE — 99213 OFFICE O/P EST LOW 20 MIN: CPT | Performed by: ORTHOPAEDIC SURGERY

## 2024-03-14 PROCEDURE — 99214 OFFICE O/P EST MOD 30 MIN: CPT | Performed by: ORTHOPAEDIC SURGERY

## 2024-03-14 ASSESSMENT — PAIN SCALES - GENERAL: PAINLEVEL_OUTOF10: 6

## 2024-03-14 ASSESSMENT — PAIN - FUNCTIONAL ASSESSMENT: PAIN_FUNCTIONAL_ASSESSMENT: 0-10

## 2024-03-14 NOTE — PROGRESS NOTES
History of present illness: Patient with a known history of bicep labral tear he had some scapular winging he had some mild scoliosis he had a thorough workup of his cervical and lumbar spine he actually went on to have lumbar spine surgery    His C-spine was normal his EMG was normal he continues to complain of paresthesias burning bicep pain pain down the arm pain with lifting with a snapping positive speeds sign he has been treated conservatively for 3 years    Physical exam:    General: No acute distress or breathing difficulty or discomfort, pleasant and cooperative with the examination.    Extremities: The right shoulder was inspected and was found to have no obvious deformity.  There was tenderness to touch over the lateral edges of the shoulder over the rotator cuff insertion.  Active forward flexion 110 degrees, external rotation to 50 degrees, abduction to 45 degrees, and internal rotation to the level of L2.    At this time the shoulder is neurovascular tact and neurosensory intact.  Motor intact C5-T1.  There was no obvious neck pain or radiculopathy noted.  There was no gross instability or adhesive capsulitis symptoms.  There was no evidence of apprehension or apprehension suppression.    Strength was tested in 4 planes with weakness in the supraspinatus strength testing and external rotation position.  There was no strength deficit in internal rotation.  Impingement signs were positive both supine and standing for impingeme recommend at this time nt test type I and II.  There was mild pain over the bicipital groove with a positive speeds sign    Diagnostic studies: We reviewed old MRI studies which shows complete tear of the superior labrum including the bicep base    Impression: Right shoulder pain bicep labral complex tear he has had no instability just tearing he has some chronic winging which is due to his scapula position due to his scoliosis there is been no EMG documented long thoracic or nerve  Noted. Will monitor infant for s/sx of infection. issue normal C-spine MRI    Plan: Right shoulder symptomatic bicep labral complex tear mostly pain no instability    Commend at this time arthroscopic labral debridement versus repair with probable bicep tenodesis    Options of surgery nonsurgical treatment of been explored    Risk and benefits of surgery discussed extensively with the patient.    Surgical risk included but were not limited to infection, wear, loosening, need for further surgery blood clot, failure to heal, failure of the surgery, stiffness, loss of limb life, extremity function change in length change, and associated risks of surgery during the coronavirus epidemic.    We talked about being in a sling between 4 and 6 weeks rehab for an additional 4 and 6 weeks he does do heavy lifting maintenance at [facility] he could be out of work anywhere from 3 to 4 months

## 2024-03-18 ENCOUNTER — LAB REQUISITION (OUTPATIENT)
Dept: LAB | Facility: HOSPITAL | Age: 29
End: 2024-03-18
Payer: COMMERCIAL

## 2024-03-18 LAB — SARS-COV-2 RNA RESP QL NAA+PROBE: NOT DETECTED

## 2024-03-18 PROCEDURE — 87635 SARS-COV-2 COVID-19 AMP PRB: CPT

## 2024-03-20 ENCOUNTER — OFFICE VISIT (OUTPATIENT)
Dept: PRIMARY CARE CLINIC | Age: 29
End: 2024-03-20
Payer: COMMERCIAL

## 2024-03-20 VITALS
HEART RATE: 89 BPM | OXYGEN SATURATION: 99 % | TEMPERATURE: 98.2 F | BODY MASS INDEX: 34.11 KG/M2 | DIASTOLIC BLOOD PRESSURE: 98 MMHG | WEIGHT: 231 LBS | SYSTOLIC BLOOD PRESSURE: 140 MMHG

## 2024-03-20 DIAGNOSIS — M54.9 MUSCULOSKELETAL BACK PAIN: Primary | ICD-10-CM

## 2024-03-20 PROCEDURE — 99213 OFFICE O/P EST LOW 20 MIN: CPT | Performed by: INTERNAL MEDICINE

## 2024-03-20 SDOH — ECONOMIC STABILITY: INCOME INSECURITY: HOW HARD IS IT FOR YOU TO PAY FOR THE VERY BASICS LIKE FOOD, HOUSING, MEDICAL CARE, AND HEATING?: NOT HARD AT ALL

## 2024-03-20 SDOH — ECONOMIC STABILITY: FOOD INSECURITY: WITHIN THE PAST 12 MONTHS, THE FOOD YOU BOUGHT JUST DIDN'T LAST AND YOU DIDN'T HAVE MONEY TO GET MORE.: NEVER TRUE

## 2024-03-20 SDOH — ECONOMIC STABILITY: FOOD INSECURITY: WITHIN THE PAST 12 MONTHS, YOU WORRIED THAT YOUR FOOD WOULD RUN OUT BEFORE YOU GOT MONEY TO BUY MORE.: NEVER TRUE

## 2024-03-20 ASSESSMENT — ENCOUNTER SYMPTOMS
BACK PAIN: 1
DIARRHEA: 0
COUGH: 0
WHEEZING: 0
NAUSEA: 0
VOMITING: 0
SHORTNESS OF BREATH: 0

## 2024-03-20 NOTE — PROGRESS NOTES
75 year old male returns for surveillance colonoscopy.  He had multiple adenomas on his last exam.  No major interval illness.  No family history of colon cancer or polyps that places him at higher risk.  He is on plavix and only stopped it three days ago..   BP (!) 140/98   Pulse 89   Temp 98.2 °F (36.8 °C)   Wt 104.8 kg (231 lb)   SpO2 99%   BMI 34.11 kg/m²     Physical Exam  Constitutional:       General: He is not in acute distress.     Appearance: He is not diaphoretic.   Cardiovascular:      Rate and Rhythm: Normal rate and regular rhythm.      Pulses: Normal pulses.      Heart sounds: Normal heart sounds, S1 normal and S2 normal.   Pulmonary:      Effort: Pulmonary effort is normal. No respiratory distress.      Breath sounds: Normal breath sounds. No wheezing or rales.   Chest:      Chest wall: No tenderness.   Abdominal:      General: Bowel sounds are normal.      Tenderness: There is no abdominal tenderness.   Neurological:      Mental Status: He is alert.       Assessment:       Diagnosis Orders   1. Musculoskeletal back pain Stable but not controlled     declines medication. Will continue PT          Plan:    No follow-ups on file.

## 2024-04-17 ENCOUNTER — LAB (OUTPATIENT)
Dept: LAB | Facility: LAB | Age: 29
End: 2024-04-17
Payer: COMMERCIAL

## 2024-04-17 DIAGNOSIS — N28.9 DISORDER OF KIDNEY AND URETER, UNSPECIFIED: Primary | ICD-10-CM

## 2024-04-17 LAB
ALBUMIN SERPL BCP-MCNC: 5 G/DL (ref 3.4–5)
ANION GAP SERPL CALC-SCNC: 12 MMOL/L (ref 10–20)
APPEARANCE UR: CLEAR
BILIRUB UR STRIP.AUTO-MCNC: NEGATIVE MG/DL
BUN SERPL-MCNC: 9 MG/DL (ref 6–23)
CALCIUM SERPL-MCNC: 9.6 MG/DL (ref 8.6–10.3)
CHLORIDE SERPL-SCNC: 105 MMOL/L (ref 98–107)
CO2 SERPL-SCNC: 26 MMOL/L (ref 21–32)
COLOR UR: YELLOW
CREAT SERPL-MCNC: 0.86 MG/DL (ref 0.5–1.3)
CREAT UR-MCNC: 159.4 MG/DL (ref 20–370)
EGFRCR SERPLBLD CKD-EPI 2021: >90 ML/MIN/1.73M*2
ERYTHROCYTE [DISTWIDTH] IN BLOOD BY AUTOMATED COUNT: 12.5 % (ref 11.5–14.5)
GLUCOSE SERPL-MCNC: 83 MG/DL (ref 74–99)
GLUCOSE UR STRIP.AUTO-MCNC: NEGATIVE MG/DL
HCT VFR BLD AUTO: 41.9 % (ref 41–52)
HGB BLD-MCNC: 14.5 G/DL (ref 13.5–17.5)
KETONES UR STRIP.AUTO-MCNC: NEGATIVE MG/DL
LEUKOCYTE ESTERASE UR QL STRIP.AUTO: NEGATIVE
MCH RBC QN AUTO: 30.3 PG (ref 26–34)
MCHC RBC AUTO-ENTMCNC: 34.6 G/DL (ref 32–36)
MCV RBC AUTO: 88 FL (ref 80–100)
NITRITE UR QL STRIP.AUTO: NEGATIVE
NRBC BLD-RTO: 0 /100 WBCS (ref 0–0)
PH UR STRIP.AUTO: 7 [PH]
PHOSPHATE SERPL-MCNC: 2.2 MG/DL (ref 2.5–4.9)
PLATELET # BLD AUTO: 228 X10*3/UL (ref 150–450)
POTASSIUM SERPL-SCNC: 4.2 MMOL/L (ref 3.5–5.3)
PROT UR STRIP.AUTO-MCNC: NEGATIVE MG/DL
PROT UR-ACNC: 11 MG/DL (ref 5–25)
PROT/CREAT UR: 0.07 MG/MG CREAT (ref 0–0.17)
RBC # BLD AUTO: 4.79 X10*6/UL (ref 4.5–5.9)
RBC # UR STRIP.AUTO: NEGATIVE /UL
SODIUM SERPL-SCNC: 139 MMOL/L (ref 136–145)
SP GR UR STRIP.AUTO: 1.02
UROBILINOGEN UR STRIP.AUTO-MCNC: <2 MG/DL
WBC # BLD AUTO: 4.4 X10*3/UL (ref 4.4–11.3)

## 2024-04-17 PROCEDURE — 80069 RENAL FUNCTION PANEL: CPT

## 2024-04-17 PROCEDURE — 82570 ASSAY OF URINE CREATININE: CPT

## 2024-04-17 PROCEDURE — 36415 COLL VENOUS BLD VENIPUNCTURE: CPT

## 2024-04-17 PROCEDURE — 85027 COMPLETE CBC AUTOMATED: CPT

## 2024-04-17 PROCEDURE — 84156 ASSAY OF PROTEIN URINE: CPT

## 2024-04-17 PROCEDURE — 81003 URINALYSIS AUTO W/O SCOPE: CPT

## 2024-04-20 PROBLEM — S46.211A TEAR OF RIGHT BICEPS MUSCLE: Status: ACTIVE | Noted: 2024-05-14

## 2024-04-20 PROBLEM — M24.111 LABRAL TEAR OF SHOULDER, DEGENERATIVE, RIGHT: Status: ACTIVE | Noted: 2024-05-14

## 2024-05-01 ENCOUNTER — LAB (OUTPATIENT)
Dept: LAB | Facility: LAB | Age: 29
End: 2024-05-01
Payer: COMMERCIAL

## 2024-05-01 DIAGNOSIS — I10 ESSENTIAL (PRIMARY) HYPERTENSION: Primary | ICD-10-CM

## 2024-05-01 DIAGNOSIS — Z01.818 PRE-OP TESTING: ICD-10-CM

## 2024-05-01 LAB
ALBUMIN SERPL BCP-MCNC: 5.2 G/DL (ref 3.4–5)
ALP SERPL-CCNC: 90 U/L (ref 33–120)
ALT SERPL W P-5'-P-CCNC: 13 U/L (ref 10–52)
ANION GAP SERPL CALC-SCNC: 14 MMOL/L (ref 10–20)
AST SERPL W P-5'-P-CCNC: 15 U/L (ref 9–39)
BASOPHILS # BLD AUTO: 0.02 X10*3/UL (ref 0–0.1)
BASOPHILS NFR BLD AUTO: 0.4 %
BILIRUB SERPL-MCNC: 0.4 MG/DL (ref 0–1.2)
BUN SERPL-MCNC: 16 MG/DL (ref 6–23)
CALCIUM SERPL-MCNC: 9.8 MG/DL (ref 8.6–10.3)
CHLORIDE SERPL-SCNC: 106 MMOL/L (ref 98–107)
CO2 SERPL-SCNC: 24 MMOL/L (ref 21–32)
CORTIS SERPL-MCNC: 19 UG/DL (ref 2.5–20)
CREAT SERPL-MCNC: 1.02 MG/DL (ref 0.5–1.3)
EGFRCR SERPLBLD CKD-EPI 2021: >90 ML/MIN/1.73M*2
EOSINOPHIL # BLD AUTO: 0.06 X10*3/UL (ref 0–0.7)
EOSINOPHIL NFR BLD AUTO: 1.3 %
ERYTHROCYTE [DISTWIDTH] IN BLOOD BY AUTOMATED COUNT: 12.4 % (ref 11.5–14.5)
GLUCOSE SERPL-MCNC: 121 MG/DL (ref 74–99)
HCT VFR BLD AUTO: 43.8 % (ref 41–52)
HGB BLD-MCNC: 14.9 G/DL (ref 13.5–17.5)
IMM GRANULOCYTES # BLD AUTO: 0.01 X10*3/UL (ref 0–0.7)
IMM GRANULOCYTES NFR BLD AUTO: 0.2 % (ref 0–0.9)
INR PPP: 1.1 (ref 0.9–1.1)
LYMPHOCYTES # BLD AUTO: 1.2 X10*3/UL (ref 1.2–4.8)
LYMPHOCYTES NFR BLD AUTO: 26.3 %
MCH RBC QN AUTO: 30 PG (ref 26–34)
MCHC RBC AUTO-ENTMCNC: 34 G/DL (ref 32–36)
MCV RBC AUTO: 88 FL (ref 80–100)
MONOCYTES # BLD AUTO: 0.26 X10*3/UL (ref 0.1–1)
MONOCYTES NFR BLD AUTO: 5.7 %
NEUTROPHILS # BLD AUTO: 3.02 X10*3/UL (ref 1.2–7.7)
NEUTROPHILS NFR BLD AUTO: 66.1 %
NRBC BLD-RTO: 0 /100 WBCS (ref 0–0)
PLATELET # BLD AUTO: 240 X10*3/UL (ref 150–450)
POTASSIUM SERPL-SCNC: 4.2 MMOL/L (ref 3.5–5.3)
PROT SERPL-MCNC: 7.4 G/DL (ref 6.4–8.2)
PROTHROMBIN TIME: 12.7 SECONDS (ref 9.8–12.8)
RBC # BLD AUTO: 4.96 X10*6/UL (ref 4.5–5.9)
SODIUM SERPL-SCNC: 140 MMOL/L (ref 136–145)
T4 FREE SERPL-MCNC: 0.85 NG/DL (ref 0.61–1.12)
TSH SERPL-ACNC: 0.6 MIU/L (ref 0.44–3.98)
WBC # BLD AUTO: 4.6 X10*3/UL (ref 4.4–11.3)

## 2024-05-01 PROCEDURE — 84443 ASSAY THYROID STIM HORMONE: CPT

## 2024-05-01 PROCEDURE — 85610 PROTHROMBIN TIME: CPT

## 2024-05-01 PROCEDURE — 36415 COLL VENOUS BLD VENIPUNCTURE: CPT

## 2024-05-01 PROCEDURE — 80053 COMPREHEN METABOLIC PANEL: CPT

## 2024-05-01 PROCEDURE — 85025 COMPLETE CBC W/AUTO DIFF WBC: CPT

## 2024-05-01 PROCEDURE — 83835 ASSAY OF METANEPHRINES: CPT

## 2024-05-01 PROCEDURE — 82533 TOTAL CORTISOL: CPT

## 2024-05-01 PROCEDURE — 84439 ASSAY OF FREE THYROXINE: CPT

## 2024-05-05 LAB
ANNOTATION COMMENT IMP: NORMAL
METANEPHS SERPL-SCNC: 0.22 NMOL/L (ref 0–0.49)
NORMETANEPH FREE SERPL-SCNC: 0.48 NMOL/L (ref 0–0.89)

## 2024-05-09 ENCOUNTER — OFFICE VISIT (OUTPATIENT)
Dept: ORTHOPEDIC SURGERY | Facility: CLINIC | Age: 29
End: 2024-05-09
Payer: COMMERCIAL

## 2024-05-09 DIAGNOSIS — S46.111S LABRAL TEAR OF LONG HEAD OF BICEPS TENDON, RIGHT, SEQUELA: ICD-10-CM

## 2024-05-09 DIAGNOSIS — M25.511 ACUTE PAIN OF RIGHT SHOULDER: Primary | ICD-10-CM

## 2024-05-09 DIAGNOSIS — M24.111 LABRAL TEAR OF SHOULDER, DEGENERATIVE, RIGHT: ICD-10-CM

## 2024-05-09 PROCEDURE — L3670 SO ACRO/CLAV CAN WEB PRE OTS: HCPCS | Performed by: PHYSICIAN ASSISTANT

## 2024-05-09 NOTE — PROGRESS NOTES
History and Physical      CHIEF COMPLAINT: Right shoulder pain and popping    HISTORY OF PRESENT ILLNESS:      The patient is a29 y.o. male with significant past medical history of right shoulder pain and popping diagnostic studies show a labral tear.  Conservative therapy is not providing relief.  After risk benefits alternatives were discussed patient likes to proceed with right shoulder arthroscopy.      Past Medical History:  Past Medical History:   Diagnosis Date    Personal history of other diseases of the circulatory system     History of essential hypertension    Personal history of other diseases of the musculoskeletal system and connective tissue     History of arthritis    Personal history of other diseases of the respiratory system     History of asthma        Past Surgical History:    Past Surgical History:   Procedure Laterality Date    OTHER SURGICAL HISTORY  10/10/2019    Spinal surgery    OTHER SURGICAL HISTORY  10/10/2019    Eye surgery    OTHER SURGICAL HISTORY  10/10/2019    Urethral dilation    XR CHEST PACEMAKER WITH FLUORO  6/3/2019    XR CHEST PACEMAKER WITH FLUORO 6/3/2019 [facility]       Medications Prior to Admission:    Current Outpatient Medications on File Prior to Visit   Medication Sig Dispense Refill    albuterol 90 mcg/actuation inhaler Inhale 2 puffs every 4 hours if needed.      amitriptyline (Elavil) 10 mg tablet Take 3 tablets (30 mg) by mouth once daily at bedtime.      cyclobenzaprine (Flexeril) 10 mg tablet Take 1 tablet (10 mg) by mouth 3 times a day as needed for muscle spasms for up to 10 days. 30 tablet 0    GABAPENTIN ORAL Take by mouth. 400 MG TABS      lidocaine (Lidoderm) 5 % patch PLACE 1 PATCH FOR 12 HRS ON THE SKIN ONCE DAILY. REMOVE & DISCARD PATCH WITHIN 12 HRS OR AS DIRECTED 90 patch 1    lisinopril 10 mg tablet Take by mouth.      omeprazole (PriLOSEC) 40 mg DR capsule Take by mouth.      predniSONE (Deltasone) 10 mg tablet 40MG FOR 2 DAYS, 30MG  FOR 2 DAYS, 20MG FOR 2 DAYS, 10MG FOR 2 DAYS 20 tablet 0    propranolol LA (Inderal LA) 120 mg 24 hr capsule Take 1 capsule (120 mg) by mouth once daily.       No current facility-administered medications on file prior to visit.        Allergies:  Patient has no known allergies.    Social History:   Social History     Socioeconomic History    Marital status: Single     Spouse name: Not on file    Number of children: Not on file    Years of education: Not on file    Highest education level: Not on file   Occupational History    Not on file   Tobacco Use    Smoking status: Some Days     Types: Cigarettes, Cigars    Smokeless tobacco: Never   Substance and Sexual Activity    Alcohol use: Not on file    Drug use: Not on file    Sexual activity: Not on file   Other Topics Concern    Not on file   Social History Narrative    Not on file     Social Determinants of Health     Financial Resource Strain: Low Risk  (3/20/2024)    Received from [facility]    Overall Financial Resource Strain (CARDIA)     Difficulty of Paying Living Expenses: Not hard at all   Food Insecurity: No Food Insecurity (3/20/2024)    Received from [facility]    Hunger Vital Sign     Worried About Running Out of Food in the Last Year: Never true     Ran Out of Food in the Last Year: Never true   Transportation Needs: Unknown (3/20/2024)    Received from [facility]    PRAPARE - Transportation     Lack of Transportation (Medical): Not on file     Lack of Transportation (Non-Medical): No   Physical Activity: Not on file   Stress: Not on file   Social Connections: Not on file   Intimate Partner Violence: Not on file   Housing Stability: Unknown (3/20/2024)    Received from [facility]    Housing Stability Vital Sign     Unable to Pay for Housing in the Last Year: Not on file     Number of Places Lived in the Last Year: Not on file     Unstable Housing in the Last  Year: No       Family History:   Family History   Problem Relation Name Age of Onset    No Known Problems Mother      No Known Problems Father          Review of systems: Noncontributory for orthopedics    Vitals:  There were no vitals taken for this visit.    Physical examination:  Head normocephalic atraumatic  Heart regular rate and rhythm  Lungs clear  Abdominal exam nontender nondistended  Extremity: Right shoulder forward flexion to 140 degrees abduction of 80 degrees external rotation of 70 degrees internal rotation to L4-5    Impression : Right shoulder labral tear      Plan:  Scheduled for right shoulder arthroscopy labral debridement versus repair possible long head bicep tenodesis    Risk and benefits of surgery discussed extensively with the patient.    Surgical risk included but were not limited to infection, wear, loosening, need for further surgery blood clot, failure to heal, failure of the surgery, stiffness, loss of limb life, extremity function change in length change, and associated risks of surgery during the coronavirus epidemic.    Risk with any surgery including arthroplasty and replacements include but are not limited to:       Wear, loosening, infection, blood clot, DVT, loss of limb, life, delayed recovery, limb length change, instability, dislocation, discomfort with new implant and failure of the procedure.

## 2024-05-13 DIAGNOSIS — S46.111S LABRAL TEAR OF LONG HEAD OF BICEPS TENDON, RIGHT, SEQUELA: Primary | ICD-10-CM

## 2024-05-13 DIAGNOSIS — M24.111 LABRAL TEAR OF SHOULDER, DEGENERATIVE, RIGHT: ICD-10-CM

## 2024-05-13 DIAGNOSIS — G89.18 ACUTE POSTOPERATIVE PAIN: ICD-10-CM

## 2024-05-13 RX ORDER — OXYCODONE AND ACETAMINOPHEN 5; 325 MG/1; MG/1
1 TABLET ORAL EVERY 6 HOURS PRN
Qty: 28 TABLET | Refills: 0 | Status: SHIPPED | OUTPATIENT
Start: 2024-05-13 | End: 2024-05-20 | Stop reason: SDUPTHER

## 2024-05-13 RX ORDER — IBUPROFEN 200 MG
200 TABLET ORAL EVERY 6 HOURS
COMMUNITY

## 2024-05-13 RX ORDER — ACETAMINOPHEN 500 MG/1
500 CAPSULE, LIQUID FILLED ORAL EVERY 6 HOURS PRN
COMMUNITY

## 2024-05-13 RX ORDER — ASPIRIN 81 MG
100 TABLET, DELAYED RELEASE (ENTERIC COATED) ORAL DAILY
COMMUNITY

## 2024-05-13 NOTE — H&P
"History Of Present Illness  Hair Lew is a 29 y.o. male presenting with right shoulder pain.  Diagnostic studies show labral tear.  Conservative therapy is failed to provide relief.  After risk benefits alternatives were discussed patient likes to proceed with right shoulder arthroscopy.     Past Medical History  He has a past medical history of Back pain, Constipation, Eczema, GERD (gastroesophageal reflux disease), Hypertension, Murmur, Personal history of other diseases of the circulatory system, Personal history of other diseases of the musculoskeletal system and connective tissue, Personal history of other diseases of the respiratory system, and Snores.    Surgical History  He has a past surgical history that includes Spine surgery; Refractive surgery; Urethra surgery; XR chest pacemaker w fluoro (06/03/2019); and Esophagogastroduodenoscopy.     Social History  He reports that he has been smoking cigars. He has never used smokeless tobacco. He reports current alcohol use. He reports that he does not use drugs.    Family History  Family History   Problem Relation Name Age of Onset    No Known Problems Mother      No Known Problems Father          Allergies  Patient has no known allergies.    Review of Systems  Noncontributory     Physical Exam  Head normocephalic atraumatic  Chest lungs are clear to auscultation bilaterally  Cardiac regular rate rhythm no murmurs rubs gallops appreciated  Abdomen soft nontender active bowel sounds x 4 quadrants  Extremities right shoulder patient has forward flexion up to 160 degrees abduction of 80 degrees external rotation 90 degrees internal rotation L4-5     Last Recorded Vitals  Height 1.753 m (5' 9\"), weight 99 kg (218 lb 4.1 oz).    Relevant Results      Scheduled medications    Continuous medications    PRN medications    No results found for this or any previous visit (from the past 24 hour(s)).    Assessment/Plan   Active Problems:    Labral tear of shoulder, " degenerative, right    Tear of right biceps muscle      Right shoulder arthroscopy labral debridement long head bicep tenodesis           Derrick Saldana, PASolC

## 2024-05-14 ENCOUNTER — HOSPITAL ENCOUNTER (OUTPATIENT)
Facility: HOSPITAL | Age: 29
Setting detail: OUTPATIENT SURGERY
Discharge: HOME | End: 2024-05-14
Attending: ORTHOPAEDIC SURGERY | Admitting: ORTHOPAEDIC SURGERY
Payer: COMMERCIAL

## 2024-05-14 ENCOUNTER — ANESTHESIA EVENT (OUTPATIENT)
Dept: OPERATING ROOM | Facility: HOSPITAL | Age: 29
End: 2024-05-14
Payer: COMMERCIAL

## 2024-05-14 ENCOUNTER — ANESTHESIA (OUTPATIENT)
Dept: OPERATING ROOM | Facility: HOSPITAL | Age: 29
End: 2024-05-14
Payer: COMMERCIAL

## 2024-05-14 VITALS
SYSTOLIC BLOOD PRESSURE: 153 MMHG | HEIGHT: 69 IN | HEART RATE: 82 BPM | DIASTOLIC BLOOD PRESSURE: 81 MMHG | WEIGHT: 214.95 LBS | TEMPERATURE: 97.2 F | RESPIRATION RATE: 18 BRPM | OXYGEN SATURATION: 95 % | BODY MASS INDEX: 31.84 KG/M2

## 2024-05-14 DIAGNOSIS — S46.211A: ICD-10-CM

## 2024-05-14 DIAGNOSIS — M75.111 PARTIAL NONTRAUMATIC TEAR OF ROTATOR CUFF, RIGHT: ICD-10-CM

## 2024-05-14 DIAGNOSIS — S46.211S TEAR OF RIGHT BICEPS MUSCLE, SEQUELA: ICD-10-CM

## 2024-05-14 DIAGNOSIS — S46.211A TEAR OF RIGHT BICEPS MUSCLE, INITIAL ENCOUNTER: Primary | ICD-10-CM

## 2024-05-14 DIAGNOSIS — M24.111 LABRAL TEAR OF SHOULDER, DEGENERATIVE, RIGHT: ICD-10-CM

## 2024-05-14 PROCEDURE — 3700000002 HC GENERAL ANESTHESIA TIME - EACH INCREMENTAL 1 MINUTE: Performed by: ORTHOPAEDIC SURGERY

## 2024-05-14 PROCEDURE — 7100000001 HC RECOVERY ROOM TIME - INITIAL BASE CHARGE: Performed by: ORTHOPAEDIC SURGERY

## 2024-05-14 PROCEDURE — 2500000004 HC RX 250 GENERAL PHARMACY W/ HCPCS (ALT 636 FOR OP/ED): Mod: JZ | Performed by: ORTHOPAEDIC SURGERY

## 2024-05-14 PROCEDURE — 3600000009 HC OR TIME - EACH INCREMENTAL 1 MINUTE - PROCEDURE LEVEL FOUR: Performed by: ORTHOPAEDIC SURGERY

## 2024-05-14 PROCEDURE — 2500000004 HC RX 250 GENERAL PHARMACY W/ HCPCS (ALT 636 FOR OP/ED): Performed by: ORTHOPAEDIC SURGERY

## 2024-05-14 PROCEDURE — 29823 SHO ARTHRS SRG XTNSV DBRDMT: CPT | Performed by: PHYSICIAN ASSISTANT

## 2024-05-14 PROCEDURE — 2500000004 HC RX 250 GENERAL PHARMACY W/ HCPCS (ALT 636 FOR OP/ED): Performed by: STUDENT IN AN ORGANIZED HEALTH CARE EDUCATION/TRAINING PROGRAM

## 2024-05-14 PROCEDURE — 3600000004 HC OR TIME - INITIAL BASE CHARGE - PROCEDURE LEVEL FOUR: Performed by: ORTHOPAEDIC SURGERY

## 2024-05-14 PROCEDURE — 3700000001 HC GENERAL ANESTHESIA TIME - INITIAL BASE CHARGE: Performed by: ORTHOPAEDIC SURGERY

## 2024-05-14 PROCEDURE — 88304 TISSUE EXAM BY PATHOLOGIST: CPT | Mod: TC,ELYLAB | Performed by: ORTHOPAEDIC SURGERY

## 2024-05-14 PROCEDURE — 23430 REPAIR BICEPS TENDON: CPT | Performed by: PHYSICIAN ASSISTANT

## 2024-05-14 PROCEDURE — 7100000009 HC PHASE TWO TIME - INITIAL BASE CHARGE: Performed by: ORTHOPAEDIC SURGERY

## 2024-05-14 PROCEDURE — 2500000001 HC RX 250 WO HCPCS SELF ADMINISTERED DRUGS (ALT 637 FOR MEDICARE OP): Performed by: STUDENT IN AN ORGANIZED HEALTH CARE EDUCATION/TRAINING PROGRAM

## 2024-05-14 PROCEDURE — 2500000005 HC RX 250 GENERAL PHARMACY W/O HCPCS: Performed by: NURSE ANESTHETIST, CERTIFIED REGISTERED

## 2024-05-14 PROCEDURE — 2500000004 HC RX 250 GENERAL PHARMACY W/ HCPCS (ALT 636 FOR OP/ED): Performed by: NURSE ANESTHETIST, CERTIFIED REGISTERED

## 2024-05-14 PROCEDURE — 2780000003 HC OR 278 NO HCPCS: Performed by: ORTHOPAEDIC SURGERY

## 2024-05-14 PROCEDURE — 2720000007 HC OR 272 NO HCPCS: Performed by: ORTHOPAEDIC SURGERY

## 2024-05-14 PROCEDURE — A4217 STERILE WATER/SALINE, 500 ML: HCPCS | Performed by: ORTHOPAEDIC SURGERY

## 2024-05-14 PROCEDURE — 88304 TISSUE EXAM BY PATHOLOGIST: CPT | Performed by: PATHOLOGY

## 2024-05-14 PROCEDURE — 7100000010 HC PHASE TWO TIME - EACH INCREMENTAL 1 MINUTE: Performed by: ORTHOPAEDIC SURGERY

## 2024-05-14 PROCEDURE — 7100000002 HC RECOVERY ROOM TIME - EACH INCREMENTAL 1 MINUTE: Performed by: ORTHOPAEDIC SURGERY

## 2024-05-14 PROCEDURE — C1713 ANCHOR/SCREW BN/BN,TIS/BN: HCPCS | Performed by: ORTHOPAEDIC SURGERY

## 2024-05-14 PROCEDURE — 29823 SHO ARTHRS SRG XTNSV DBRDMT: CPT | Performed by: ORTHOPAEDIC SURGERY

## 2024-05-14 PROCEDURE — 23430 REPAIR BICEPS TENDON: CPT | Performed by: ORTHOPAEDIC SURGERY

## 2024-05-14 DEVICE — PROXIMAL TENODESIS IMPLANT SYSTEM REV: 0
Type: IMPLANTABLE DEVICE | Site: SHOULDER | Status: FUNCTIONAL
Brand: ARTHREX®

## 2024-05-14 RX ORDER — MEPERIDINE HYDROCHLORIDE 25 MG/ML
12.5 INJECTION INTRAMUSCULAR; INTRAVENOUS; SUBCUTANEOUS EVERY 10 MIN PRN
Status: DISCONTINUED | OUTPATIENT
Start: 2024-05-14 | End: 2024-05-14 | Stop reason: HOSPADM

## 2024-05-14 RX ORDER — FENTANYL CITRATE 50 UG/ML
50 INJECTION, SOLUTION INTRAMUSCULAR; INTRAVENOUS EVERY 5 MIN PRN
Status: DISCONTINUED | OUTPATIENT
Start: 2024-05-14 | End: 2024-05-14 | Stop reason: HOSPADM

## 2024-05-14 RX ORDER — LIDOCAINE HYDROCHLORIDE 20 MG/ML
INJECTION, SOLUTION INFILTRATION; PERINEURAL AS NEEDED
Status: DISCONTINUED | OUTPATIENT
Start: 2024-05-14 | End: 2024-05-14

## 2024-05-14 RX ORDER — SODIUM CHLORIDE, SODIUM LACTATE, POTASSIUM CHLORIDE, CALCIUM CHLORIDE 600; 310; 30; 20 MG/100ML; MG/100ML; MG/100ML; MG/100ML
50 INJECTION, SOLUTION INTRAVENOUS CONTINUOUS
Status: DISCONTINUED | OUTPATIENT
Start: 2024-05-14 | End: 2024-05-14 | Stop reason: HOSPADM

## 2024-05-14 RX ORDER — LIDOCAINE HYDROCHLORIDE 10 MG/ML
0.1 INJECTION, SOLUTION EPIDURAL; INFILTRATION; INTRACAUDAL; PERINEURAL ONCE
Status: DISCONTINUED | OUTPATIENT
Start: 2024-05-14 | End: 2024-05-14 | Stop reason: HOSPADM

## 2024-05-14 RX ORDER — MIDAZOLAM HYDROCHLORIDE 1 MG/ML
INJECTION, SOLUTION INTRAMUSCULAR; INTRAVENOUS AS NEEDED
Status: DISCONTINUED | OUTPATIENT
Start: 2024-05-14 | End: 2024-05-14

## 2024-05-14 RX ORDER — SODIUM CHLORIDE, SODIUM LACTATE, POTASSIUM CHLORIDE, CALCIUM CHLORIDE 600; 310; 30; 20 MG/100ML; MG/100ML; MG/100ML; MG/100ML
100 INJECTION, SOLUTION INTRAVENOUS CONTINUOUS
Status: CANCELLED | OUTPATIENT
Start: 2024-05-14

## 2024-05-14 RX ORDER — FENTANYL CITRATE 50 UG/ML
INJECTION, SOLUTION INTRAMUSCULAR; INTRAVENOUS AS NEEDED
Status: DISCONTINUED | OUTPATIENT
Start: 2024-05-14 | End: 2024-05-14

## 2024-05-14 RX ORDER — LABETALOL HYDROCHLORIDE 5 MG/ML
5 INJECTION, SOLUTION INTRAVENOUS ONCE AS NEEDED
Status: DISCONTINUED | OUTPATIENT
Start: 2024-05-14 | End: 2024-05-14 | Stop reason: HOSPADM

## 2024-05-14 RX ORDER — SODIUM CHLORIDE 0.9 G/100ML
IRRIGANT IRRIGATION AS NEEDED
Status: DISCONTINUED | OUTPATIENT
Start: 2024-05-14 | End: 2024-05-14 | Stop reason: HOSPADM

## 2024-05-14 RX ORDER — PROPOFOL 10 MG/ML
INJECTION, EMULSION INTRAVENOUS AS NEEDED
Status: DISCONTINUED | OUTPATIENT
Start: 2024-05-14 | End: 2024-05-14

## 2024-05-14 RX ORDER — ONDANSETRON HYDROCHLORIDE 2 MG/ML
INJECTION, SOLUTION INTRAVENOUS AS NEEDED
Status: DISCONTINUED | OUTPATIENT
Start: 2024-05-14 | End: 2024-05-14

## 2024-05-14 RX ORDER — OXYCODONE HYDROCHLORIDE 5 MG/1
5 TABLET ORAL EVERY 4 HOURS PRN
Status: DISCONTINUED | OUTPATIENT
Start: 2024-05-14 | End: 2024-05-14 | Stop reason: HOSPADM

## 2024-05-14 RX ORDER — ONDANSETRON 4 MG/1
4 TABLET, FILM COATED ORAL EVERY 8 HOURS PRN
Status: CANCELLED | OUTPATIENT
Start: 2024-05-14

## 2024-05-14 RX ORDER — DIPHENHYDRAMINE HYDROCHLORIDE 50 MG/ML
12.5 INJECTION INTRAMUSCULAR; INTRAVENOUS ONCE AS NEEDED
Status: DISCONTINUED | OUTPATIENT
Start: 2024-05-14 | End: 2024-05-14 | Stop reason: HOSPADM

## 2024-05-14 RX ORDER — DROPERIDOL 2.5 MG/ML
0.62 INJECTION, SOLUTION INTRAMUSCULAR; INTRAVENOUS ONCE AS NEEDED
Status: DISCONTINUED | OUTPATIENT
Start: 2024-05-14 | End: 2024-05-14 | Stop reason: HOSPADM

## 2024-05-14 RX ORDER — OXYCODONE AND ACETAMINOPHEN 5; 325 MG/1; MG/1
1 TABLET ORAL EVERY 6 HOURS PRN
Status: CANCELLED | OUTPATIENT
Start: 2024-05-14

## 2024-05-14 RX ORDER — ROCURONIUM BROMIDE 10 MG/ML
INJECTION, SOLUTION INTRAVENOUS AS NEEDED
Status: DISCONTINUED | OUTPATIENT
Start: 2024-05-14 | End: 2024-05-14

## 2024-05-14 RX ORDER — CEFAZOLIN SODIUM 2 G/100ML
2 INJECTION, SOLUTION INTRAVENOUS ONCE
Status: COMPLETED | OUTPATIENT
Start: 2024-05-14 | End: 2024-05-14

## 2024-05-14 RX ADMIN — DEXAMETHASONE SODIUM PHOSPHATE 8 MG: 4 INJECTION, SOLUTION INTRAMUSCULAR; INTRAVENOUS at 14:24

## 2024-05-14 RX ADMIN — ROPIVACAINE HYDROCHLORIDE: 5 INJECTION EPIDURAL; INFILTRATION; PERINEURAL at 12:57

## 2024-05-14 RX ADMIN — SODIUM CHLORIDE, POTASSIUM CHLORIDE, SODIUM LACTATE AND CALCIUM CHLORIDE 50 ML/HR: 600; 310; 30; 20 INJECTION, SOLUTION INTRAVENOUS at 11:10

## 2024-05-14 RX ADMIN — FENTANYL CITRATE 50 MCG: 50 INJECTION, SOLUTION INTRAMUSCULAR; INTRAVENOUS at 15:21

## 2024-05-14 RX ADMIN — SODIUM CHLORIDE, SODIUM LACTATE, POTASSIUM CHLORIDE, AND CALCIUM CHLORIDE: .6; .31; .03; .02 INJECTION, SOLUTION INTRAVENOUS at 14:09

## 2024-05-14 RX ADMIN — FENTANYL CITRATE 100 MCG: 50 INJECTION, SOLUTION INTRAMUSCULAR; INTRAVENOUS at 14:13

## 2024-05-14 RX ADMIN — SUGAMMADEX 200 MG: 100 INJECTION, SOLUTION INTRAVENOUS at 14:56

## 2024-05-14 RX ADMIN — OXYCODONE HYDROCHLORIDE 5 MG: 5 TABLET ORAL at 16:08

## 2024-05-14 RX ADMIN — MIDAZOLAM 2 MG: 1 INJECTION INTRAMUSCULAR; INTRAVENOUS at 12:50

## 2024-05-14 RX ADMIN — PROPOFOL 200 MG: 10 INJECTION, EMULSION INTRAVENOUS at 14:13

## 2024-05-14 RX ADMIN — FENTANYL CITRATE 50 MCG: 50 INJECTION, SOLUTION INTRAMUSCULAR; INTRAVENOUS at 15:26

## 2024-05-14 RX ADMIN — FENTANYL CITRATE 50 MCG: 50 INJECTION, SOLUTION INTRAMUSCULAR; INTRAVENOUS at 15:36

## 2024-05-14 RX ADMIN — LIDOCAINE HYDROCHLORIDE 80 MG: 20 INJECTION, SOLUTION INFILTRATION; PERINEURAL at 14:13

## 2024-05-14 RX ADMIN — ONDANSETRON 4 MG: 2 INJECTION, SOLUTION INTRAMUSCULAR; INTRAVENOUS at 14:38

## 2024-05-14 RX ADMIN — ROCURONIUM BROMIDE 70 MG: 10 SOLUTION INTRAVENOUS at 14:13

## 2024-05-14 RX ADMIN — CEFAZOLIN SODIUM 2 G: 2 INJECTION, SOLUTION INTRAVENOUS at 14:24

## 2024-05-14 RX ADMIN — FENTANYL CITRATE 50 MCG: 50 INJECTION, SOLUTION INTRAMUSCULAR; INTRAVENOUS at 15:31

## 2024-05-14 ASSESSMENT — PAIN SCALES - GENERAL
PAINLEVEL_OUTOF10: 0 - NO PAIN
PAINLEVEL_OUTOF10: 5 - MODERATE PAIN
PAINLEVEL_OUTOF10: 3
PAINLEVEL_OUTOF10: 6
PAINLEVEL_OUTOF10: 5 - MODERATE PAIN
PAIN_LEVEL: 0
PAINLEVEL_OUTOF10: 3
PAINLEVEL_OUTOF10: 0 - NO PAIN
PAINLEVEL_OUTOF10: 4
PAINLEVEL_OUTOF10: 5 - MODERATE PAIN
PAINLEVEL_OUTOF10: 0 - NO PAIN
PAINLEVEL_OUTOF10: 6
PAINLEVEL_OUTOF10: 0 - NO PAIN
PAINLEVEL_OUTOF10: 3
PAINLEVEL_OUTOF10: 6
PAINLEVEL_OUTOF10: 4

## 2024-05-14 ASSESSMENT — PAIN DESCRIPTION - DESCRIPTORS
DESCRIPTORS: ACHING;SPASM;SORE
DESCRIPTORS: SHARP
DESCRIPTORS: ACHING;SHARP
DESCRIPTORS: ACHING;SORE

## 2024-05-14 ASSESSMENT — PAIN - FUNCTIONAL ASSESSMENT
PAIN_FUNCTIONAL_ASSESSMENT: 0-10
PAIN_FUNCTIONAL_ASSESSMENT: WONG-BAKER FACES
PAIN_FUNCTIONAL_ASSESSMENT: WONG-BAKER FACES
PAIN_FUNCTIONAL_ASSESSMENT: 0-10

## 2024-05-14 ASSESSMENT — COLUMBIA-SUICIDE SEVERITY RATING SCALE - C-SSRS
6. HAVE YOU EVER DONE ANYTHING, STARTED TO DO ANYTHING, OR PREPARED TO DO ANYTHING TO END YOUR LIFE?: NO
1. IN THE PAST MONTH, HAVE YOU WISHED YOU WERE DEAD OR WISHED YOU COULD GO TO SLEEP AND NOT WAKE UP?: NO
2. HAVE YOU ACTUALLY HAD ANY THOUGHTS OF KILLING YOURSELF?: NO

## 2024-05-14 NOTE — ANESTHESIA PREPROCEDURE EVALUATION
Patient: Hair Lew    Procedure Information       Date/Time: 05/14/24 6971    Procedure: Arthroscopy Shoulder Labral Repair Versus Debridement with Bicep Tenodesis (Right: Shoulder) - INTERSCALENE BLOCK    Location: [facility] / [facility]    Surgeons: Derrick Todd MD            Relevant Problems   Neuro   (+) Lumbar radiculopathy       Clinical information reviewed:   Tobacco  Allergies  Meds   Med Hx  Surg Hx   Fam Hx  Soc Hx        NPO Detail:  No data recorded     Physical Exam    Airway  Mallampati: II     Cardiovascular   Rhythm: regular  Rate: normal     Dental    Pulmonary - normal exam     Abdominal - normal exam             Anesthesia Plan    History of general anesthesia?: yes  History of complications of general anesthesia?: no    ASA 2     general and regional     intravenous induction   Postoperative administration of opioids is intended.  Anesthetic plan and risks discussed with patient.

## 2024-05-14 NOTE — DISCHARGE INSTRUCTIONS
Medication given may have significant effects after discharge. Therefore on the day of surgery:  1) you must be accompanied by a responsible adult upon discharge and for 24 hours after surgery.  Do not drive a motor vehicle, operate machinery, power tools or appliance, drink alcoholic beverages, or make critical decisions for 24 hours  2) Be aware of dizziness, which may cause a fall. Change positions slowly.  3) Eating: you may resume your regular diet but it is better to increase intake slowly with mild foods and working up to your regular diet. No greasy, fried or spicy foods today.  4) Nausea/Vomiting: Nausea and vomiting may occur as you become more active or begin to increase food intake. If this should happen, decrease activity and return to liquids. If the problem persists, call your surgeon  5) Pain: Your surgeon may have given you a prescription for pain medication. Take pain medication with food as prescribed. Pain medication may cause constipation, so drink plenty of fluids. If your pain medication does not provide adequate relief, call your surgeon  6) Urinating: Notify your surgeon if you have not urinated within 12 hours after discharge  7) Ice: Apply ice to operative site for 20 min 5-6 times a day or use Polar care as instructed  8) Dressing:   [x]   Remove dressing in 24hr    []  Remove dressing in 48hr   [x]  Leave open to air after initial dressing is taken off and incision is dry, then okay to shower  Do not remove the steri-strips. (no bath/ hot tubs/ pools)   []  Leave dressing in place. Keep dressing/ incision clean and dry    9) Activity    Shoulder/ elbow/Hand   [x]  Elevate extremity    [x]  Sling   [x] at all times (except for exercises and showering)  [] as needed only for comfort   [] Begin daily motion exercises out of sling as instructed   [x]  Bend and flex fingers/wrist/elbow frequently   [] other   Knee/ Ankle/ Foot   [] elevate extremity   [] crutches        [] non-weight  bearing to operative extremity  [] partial weight bearing  [] Full weight bearing as tolerated   []  Use brace whenever walking   [] other      10) Begin physical therapy if advised by you physician:   [] before returning to see you doctor   [x] not until you follow up with your doctor    11) call your doctor at [phone] for an appointment (or follow up as scheduled)    Contact [facility] office if  Increased redness, swelling, drainage of any kind, and/or pain to surgery site.  As well as new onset fevers and or chills.  These could signify an infection.  Calf or thigh tenderness to touch as well as increased swelling or redness.  This could signify a clot formation.  Numbness or tingling to an area around the incision site or below the incision site (toes). Or if the operative extremity becomes cold, blue.  Any rash appears, increased  or new onset nausea/vomiting occur.  This may indicate a reaction to a medication.  Temp is 38.5 C (101F)  12) If you have any concerns or questions, please call [facility] surgeon on call. The 24- hour phone is [phone]  13) If you are unable to contact your surgeon, in an emergency situation, go to the nearest hospital      General Anesthesia Discharge Instructions    About this topic  You may need general anesthesia if you need to be asleep during a procedure. Your doctor will use drugs to block the signals that go from your nerves to your brain. Doctors give general anesthesia during a surgery or procedure to:  Allow you to sleep  Help your body be still  Relax your muscles  Help you to relax and be pain free  Keep you from remembering the surgery  Let the doctor manage your airway, breathing, and blood flow  The doctor or nurse anesthetist gives general anesthesia by a shot into your vein. Sometimes, you may breathe in a gas through a mask placed over your face.  What care is needed at home?  Ask your doctor what you need to do when you go home.  Make sure you ask questions if you do not understand what the doctor says.  Your doctor may give you drugs to prevent or treat an upset stomach from the anesthetic. Take them as ordered.  If your throat is sore, suck on ice chips or popsicles to ease throat pain.  Put 2 to 3 pillows under your head and back when you lie down to help you breathe easier.  For the first 24 to 48 hours:  Do not operate heavy or dangerous machinery.  Do not make major decisions or sign important papers. You may not be able to think clearly.  Avoid beer, wine, or mixed drinks.  You are at a higher risk of falling for at least 24 hours after general anesthesia.  Take extra care when you get up.  Do not change positions quickly.  Do not rush when you need to go to the bathroom or to answer the phone.  Ask for help if you feel unsteady when you try to walk.  Wear shoes with non-slip soles and low heels.  What follow-up care is needed?  Your doctor may ask you to come back to the office to check on your progress. Be sure to keep these visits.  If you have stitches that do not dissolve or staples, you will need to have them removed. Your doctor will want to do this in 1 to 2 weeks. If the doctor used skin glue, the glue will fall off on its own.  What drugs may be needed?  The doctor may order drugs to:  Help with pain  Treat an upset stomach or throwing up  Will physical activity be limited?  You will not be allowed to drive right away after the procedure. Ask a family member or a friend to drive you home.  Avoid trying to get out of bed without help until you are sure of your balance.  You may have to limit your activity. Talk to your doctor about if you need to limit how much you lift or limit exercise after your procedure.  What changes to diet are needed?  Start with a light diet when you are fully awake. This includes things that are easy to swallow like soups, pudding, jello, toast, and eggs. Slowly progress to your normal diet.  What  problems could happen?  Low blood pressure  Breathing problems  Upset stomach or throwing up  Dizziness  Blood clots  Infection  When do I need to call the doctor?  Trouble breathing  Upset stomach or throwing up more than 3 times in the next 2 days  Dizziness  Teach Back: Helping You Understand  The Teach Back Method helps you understand the information we are giving you. After you talk with the staff, tell them in your own words what you learned. This helps to make sure the staff has described each thing clearly. It also helps to explain things that may have been confusing. Before going home, make sure you can do these:  I can tell you about my procedure.  I can tell you if I need to follow up with my doctor.  I can tell you what is good for me to eat and drink the next day.  I can tell you what I would do if I have trouble breathing, an upset stomach, or dizziness.  Where can I learn more?  [employer]  https://www.nigms.nih.gov/education/pages/factsheet_Anesthesia.aspx  [facility] Choices  http://www.nhs.uk/conditions/Anaesthetic-general/Pages/Definition.aspx  Last Reviewed Date  2020-04-22

## 2024-05-14 NOTE — ANESTHESIA POSTPROCEDURE EVALUATION
Patient: Hair Lew    Procedure Summary       Date: 05/14/24 Room / Location: [facility]    Anesthesia Start: 1409 Anesthesia Stop: 1507    Procedure: Arthroscopy Shoulder Labral Repair Versus Debridement with Bicep Tenodesis (Right: Shoulder) Diagnosis:       Labral tear of shoulder, degenerative, right      Tear of right biceps muscle      Partial nontraumatic tear of rotator cuff, right      (Labral tear of shoulder, degenerative, right [M24.111])      (Tear of right biceps muscle [S46.211A])    Surgeons: Derrick Todd MD Responsible Provider: Mg Merrill DO    Anesthesia Type: general, regional ASA Status: 2            Anesthesia Type: general, regional    Vitals Value Taken Time   /70 05/14/24 1505   Temp 36 05/14/24 1507   Pulse 70 05/14/24 1506   Resp 21 05/14/24 1506   SpO2 96 % 05/14/24 1506   Vitals shown include unfiled device data.    Anesthesia Post Evaluation    Patient location during evaluation: bedside  Patient participation: complete - patient participated  Level of consciousness: sleepy but conscious  Pain score: 0  Pain management: adequate  Airway patency: patent  Cardiovascular status: acceptable and stable  Respiratory status: acceptable and face mask  Hydration status: stable  Postoperative Nausea and Vomiting: none        No notable events documented.

## 2024-05-14 NOTE — ANESTHESIA PROCEDURE NOTES
Airway  Date/Time: 5/14/2024 2:19 PM  Urgency: elective    Airway not difficult    Staffing  Performed: CRNA   Authorized by: Mg Merrill DO    Performed by: DORCAS Morrison-YESI  Patient location during procedure: OR    Indications and Patient Condition  Indications for airway management: anesthesia and airway protection  Spontaneous Ventilation: absent  Sedation level: deep  Preoxygenated: yes  Patient position: sniffing  MILS maintained throughout  Mask difficulty assessment: 1 - vent by mask  Planned trial extubation    Final Airway Details  Final airway type: endotracheal airway      Successful airway: ETT  Cuffed: yes   Successful intubation technique: video laryngoscopy  Facilitating devices/methods: intubating stylet  Endotracheal tube insertion site: oral  Blade: Danni  Blade size: #3  ETT size (mm): 7.5  Cormack-Lehane Classification: grade I - full view of glottis  Placement verified by: chest auscultation and capnometry   Cuff volume (mL): 7  Measured from: gums  ETT to gums (cm): 22  Number of attempts at approach: 1    Additional Comments  atraumatic

## 2024-05-14 NOTE — ANESTHESIA PROCEDURE NOTES
Peripheral Block    Patient location during procedure: pre-op  Start time: 5/14/2024 12:50 PM  End time: 5/14/2024 1:00 PM  Reason for block: at surgeon's request and post-op pain management  Staffing  Performed: attending   Authorized by: Mg Merrill DO    Performed by: Mg Merrill DO  Preanesthetic Checklist  Completed: patient identified, IV checked, site marked, risks and benefits discussed, surgical consent, monitors and equipment checked, pre-op evaluation and timeout performed   Timeout performed at:   Peripheral Block  Patient position: laying flat  Prep: ChloraPrep  Patient monitoring: heart rate, continuous pulse ox and cardiac monitor  Block type: interscalene  Laterality: right  Injection technique: single-shot  Guidance: ultrasound guided  Local infiltration: lidocaine  Infiltration strength: 1 %  Dose: 2 mL  Needle  Needle gauge: 21 G  Needle length: 10 cm  Needle localization: ultrasound guidance     image stored in chart  Assessment  Injection assessment: negative aspiration for heme, no paresthesia on injection, incremental injection and local visualized surrounding nerve on ultrasound  Heart rate change: no  Additional Notes  Time out performed. 20 ml .5 % Ropivacaine with 5mg Decadron used in divided doses. Patient tolerated procedure well.

## 2024-05-14 NOTE — OP NOTE
Arthroscopy Shoulder Labral Repair Versus Debridement with Bicep Tenodesis (R) Operative Note  Preop diagnosis: Right shoulder labral tear and long head bicep tear with subluxation    Postop diagnosis: Right shoulder labral tear, right shoulder long head bicep tear with subluxation, partial undersurface rotator cuff tear      Assistant: Derrick Saldana physician assistant (PAC) was required and present throughout the entire case.  Given the nature of the disease process and the procedure, a skilled surgical first assistant was necessary during the entire case.  The assistant was necessary to hold retractors and manipulate extremity during the procedure.  A certified scrub tech was also present at the back table managing instruments with supplies for the surgical case    Date: 2024  OR Location: [facility]    Name: Hair Lew, : 1995, Age: 29 y.o., MRN: [id number], Sex: male    Diagnosis  Pre-op Diagnosis     * Labral tear of shoulder, degenerative, right [M24.111]     * Tear of right biceps muscle [S46.211A] Post-op Diagnosis     * Labral tear of shoulder, degenerative, right [M24.111]     * Tear of right biceps muscle [S46.211A]     * Partial nontraumatic tear of rotator cuff, right [M75.111]     Procedures  Right shoulder arthroscopic extensive debridement of partial undersurface cuff tear labral tear,  bicep tear,  bicep base tear, and with bicep tenotomy 96790    Right shoulder open proximal long head bicep tenodesis 68436  Surgeons      * Derrick Todd - Primary    Resident/Fellow/Other Assistant:  Surgeons and Role:     * Derrick Saldana PA-C - Assisting    Procedure Summary  Anesthesia: General  ASA: II  Anesthesia Staff: Anesthesiologist: Mg Merrill DO  CRNA: DORCAS Morrison-CRNA  Estimated Blood Loss: Less than 10 mL  Intra-op Medications: Administrations occurring from 1315 to 1415 on 24:  * No intraprocedure medications in log *            Anesthesia Record               Intraprocedure I/O Totals          Intake    LR bolus 1400.00 mL    Total Intake 1400 mL          Specimen:   ID Type Source Tests Collected by Time   1 : RIGHT BICEPS TENDON Tissue TENDON/TENDON SHEATH SURGICAL PATHOLOGY EXAM Derrick Todd MD 5/14/2024 5075        Staff:   Circulator: Atiya Nunes RN  Scrub Person: Chelsy Rubalcava         Drains and/or Catheters: * None in log *    Tourniquet Times:         Implants:  Implants       Type Name Action Serial No.      Implant KIT, IMPLANT SYSTEM, PROXIMAL TENODESIS - [id number] Implanted      Implant KIT, IMPLANT SYSTEM, PROXIMAL TENODESIS - [id number] Implanted               Findings: see procedure details    Indications: Hair Lew is an 29 y.o. male who is having surgery for Labral tear of shoulder, degenerative, right [M24.111]  Tear of right biceps muscle [S46.211A].     The patient was seen in the preoperative area. The risks, benefits, complications, treatment options, non-operative alternatives, expected recovery and outcomes were discussed with the patient. The possibilities of reaction to medication, pulmonary aspiration, injury to surrounding structures, bleeding, recurrent infection, the need for additional procedures, failure to diagnose a condition, and creating a complication requiring transfusion or operation were discussed with the patient. The patient concurred with the proposed plan, giving informed consent.  The site of surgery was properly noted/marked if necessary per policy. The patient has been actively warmed in preoperative area. Preoperative antibiotics have been ordered and given within 1 hours of incision. Venous thrombosis prophylaxis have been ordered.    Procedure Details:   Risks and benefits of surgery discussed extensively with the patient included but not limited to failure procedure stiffness delayed prolonged recovery associated risks of anesthetic retear need for further surgery loss of  limb and life.  Patient also understood the risks of surgery during the coronavirus epidemic.Patient brought operating room at [facility] induction with anesthesia placed in the lateral position.  Exam under anesthesia showed full range of motion with no instability.      The arm was prepped and draped and placed in shoulder traction in the lateral position.  Standard portals were established from posterior to anterior and with the aid of a switching stick we established an anterior portal.  Humeral head and glenoid were intact the labrum was frayed significantly it was debrided extensively and as it was torn including the bicep and also at the bicep base.        The bicep in addition to being torn was also subluxed anteriorly out of the interval.    A bicep tenotomy was completed.  The bicep then withdrew through the interval.  The residual labrum was smooth debrided and cleaned extensively to a stable labral rim was left in place.        The articular surface of the glenohumeral joint was intact.  Superiorly we could easily see a f small partial surface cuff tear involving only 5 to 10% of the undersurface of the supraspinatus tendon just posterior to the bicep exit.  This was easily smooth and debrided leaving behind a stable footprint our insertion site of the supraspinatus tendon.      Final photodocumentation was completed and the portals were closed.      We then  tilted the patient 45 degrees and began our bicep tenodesis.  A small skin incision was made in the axillary fold of 1-1/2 cm.  Skin was opened and dissection was completed bluntly and retractors were placed around the humerus under the pectoralis major protecting the neurovascular structures.  We identified the diseased bicep tendon which had been previously tenotomized.  We withdrew the bicep through this incision and then completed the tenodesis to the anterior cortex of the humerus below the bicipital groove.  The tenodesis was  completed with a bicortical tunnel and an Arthrex tenodesis button system with a #2 FiberWire that was used to retention the residual bicep construct to its normal resting length.  The diseased portion of the tendon was sent to pathology.  Final lavage irrigation completed we then closed this incision with 2-0 Vicryl and 4-0 Monocryl.        A compressive dressing was applied and the patient was transferred to recovery room after anesthesia was reversed and the patient was placed in a sling in the supine position      Complications:  None; patient tolerated the procedure well.    Disposition: PACU - hemodynamically stable.  Condition: stable         Derrick Todd  Phone Number: [phone]

## 2024-05-16 ENCOUNTER — OFFICE VISIT (OUTPATIENT)
Dept: ORTHOPEDIC SURGERY | Facility: CLINIC | Age: 29
End: 2024-05-16
Payer: COMMERCIAL

## 2024-05-16 DIAGNOSIS — M25.511 ACUTE PAIN OF RIGHT SHOULDER: ICD-10-CM

## 2024-05-16 DIAGNOSIS — S46.111S LABRAL TEAR OF LONG HEAD OF BICEPS TENDON, RIGHT, SEQUELA: Primary | ICD-10-CM

## 2024-05-16 PROCEDURE — 99024 POSTOP FOLLOW-UP VISIT: CPT | Performed by: PHYSICIAN ASSISTANT

## 2024-05-16 NOTE — PROGRESS NOTES
History of present illness status post right shoulder arthroscopy labral debridement and long head bicep tenodesis.  Patient presents in sling.      Physical exam:      General: No acute distress or breathing difficulty or discomfort, pleasant and cooperative with the examination.    Extremities: Right shoulder incisions are clean dry and intact sutures removed new Steri-Strips were placed there is no drainage or evidence of infection      Impression: Status post right shoulder arthroscopy long head bicep tenodesis    Plan: Patient will remain in his sling when he is up and about till 6/4/2024.  He will then start physical therapy.  His prescription was provided today.  No resistance until 6/25/2024.  He will follow-up in 3 weeks with an x-ray outlet view of his right shoulder and range of motion wound check.

## 2024-05-20 DIAGNOSIS — G89.18 ACUTE POSTOPERATIVE PAIN: ICD-10-CM

## 2024-05-20 DIAGNOSIS — S46.111S LABRAL TEAR OF LONG HEAD OF BICEPS TENDON, RIGHT, SEQUELA: ICD-10-CM

## 2024-05-20 DIAGNOSIS — M24.111 LABRAL TEAR OF SHOULDER, DEGENERATIVE, RIGHT: ICD-10-CM

## 2024-05-20 RX ORDER — OXYCODONE AND ACETAMINOPHEN 5; 325 MG/1; MG/1
1 TABLET ORAL EVERY 6 HOURS PRN
Qty: 28 TABLET | Refills: 0 | Status: SHIPPED | OUTPATIENT
Start: 2024-05-20 | End: 2024-05-28 | Stop reason: SDUPTHER

## 2024-05-23 LAB
LABORATORY COMMENT REPORT: NORMAL
PATH REPORT.FINAL DX SPEC: NORMAL
PATH REPORT.GROSS SPEC: NORMAL
PATH REPORT.RELEVANT HX SPEC: NORMAL
PATH REPORT.TOTAL CANCER: NORMAL

## 2024-05-28 DIAGNOSIS — M62.830 BACK MUSCLE SPASM: ICD-10-CM

## 2024-05-28 DIAGNOSIS — M24.111 LABRAL TEAR OF SHOULDER, DEGENERATIVE, RIGHT: ICD-10-CM

## 2024-05-28 DIAGNOSIS — S46.111S LABRAL TEAR OF LONG HEAD OF BICEPS TENDON, RIGHT, SEQUELA: Primary | ICD-10-CM

## 2024-05-28 DIAGNOSIS — G89.18 ACUTE POSTOPERATIVE PAIN: ICD-10-CM

## 2024-05-28 DIAGNOSIS — S46.111S LABRAL TEAR OF LONG HEAD OF BICEPS TENDON, RIGHT, SEQUELA: ICD-10-CM

## 2024-05-28 RX ORDER — OXYCODONE AND ACETAMINOPHEN 5; 325 MG/1; MG/1
1 TABLET ORAL EVERY 6 HOURS PRN
Qty: 28 TABLET | Refills: 0 | Status: SHIPPED | OUTPATIENT
Start: 2024-05-28 | End: 2024-05-28 | Stop reason: SDUPTHER

## 2024-05-28 RX ORDER — CYCLOBENZAPRINE HCL 10 MG
10 TABLET ORAL 3 TIMES DAILY PRN
Qty: 30 TABLET | Refills: 0 | Status: SHIPPED | OUTPATIENT
Start: 2024-05-28 | End: 2024-06-07

## 2024-05-28 RX ORDER — OXYCODONE AND ACETAMINOPHEN 5; 325 MG/1; MG/1
1 TABLET ORAL EVERY 6 HOURS PRN
Qty: 28 TABLET | Refills: 0 | Status: SHIPPED | OUTPATIENT
Start: 2024-05-28 | End: 2024-06-04

## 2024-06-02 DIAGNOSIS — G43.019 INTRACTABLE MIGRAINE WITHOUT AURA AND WITHOUT STATUS MIGRAINOSUS: ICD-10-CM

## 2024-06-03 RX ORDER — AMITRIPTYLINE HYDROCHLORIDE 10 MG/1
TABLET, FILM COATED ORAL
Qty: 270 TABLET | Refills: 1 | Status: SHIPPED | OUTPATIENT
Start: 2024-06-03

## 2024-06-04 ENCOUNTER — TELEPHONE (OUTPATIENT)
Dept: ORTHOPEDIC SURGERY | Facility: CLINIC | Age: 29
End: 2024-06-04
Payer: COMMERCIAL

## 2024-06-04 DIAGNOSIS — M24.111 LABRAL TEAR OF SHOULDER, DEGENERATIVE, RIGHT: ICD-10-CM

## 2024-06-04 NOTE — TELEPHONE ENCOUNTER
Patient is a Rt ext debride/biceps 5/14/24 and would like a med refill to [facility]

## 2024-06-05 ENCOUNTER — HOSPITAL ENCOUNTER (OUTPATIENT)
Dept: RADIOLOGY | Facility: HOSPITAL | Age: 29
Discharge: HOME | End: 2024-06-05
Payer: COMMERCIAL

## 2024-06-05 DIAGNOSIS — I10 ESSENTIAL (PRIMARY) HYPERTENSION: ICD-10-CM

## 2024-06-05 PROCEDURE — 93975 VASCULAR STUDY: CPT

## 2024-06-05 RX ORDER — OXYCODONE AND ACETAMINOPHEN 5; 325 MG/1; MG/1
1 TABLET ORAL EVERY 6 HOURS PRN
Qty: 28 TABLET | Refills: 0 | Status: SHIPPED | OUTPATIENT
Start: 2024-06-05 | End: 2024-06-12

## 2024-06-05 RX ORDER — CYCLOBENZAPRINE HCL 10 MG
10 TABLET ORAL 3 TIMES DAILY PRN
Qty: 30 TABLET | Refills: 0 | Status: SHIPPED | OUTPATIENT
Start: 2024-06-05 | End: 2024-06-15

## 2024-06-06 ENCOUNTER — OFFICE VISIT (OUTPATIENT)
Dept: ORTHOPEDIC SURGERY | Facility: CLINIC | Age: 29
End: 2024-06-06
Payer: COMMERCIAL

## 2024-06-06 ENCOUNTER — HOSPITAL ENCOUNTER (OUTPATIENT)
Dept: RADIOLOGY | Facility: CLINIC | Age: 29
Discharge: HOME | End: 2024-06-06
Payer: COMMERCIAL

## 2024-06-06 DIAGNOSIS — M25.511 ACUTE PAIN OF RIGHT SHOULDER: ICD-10-CM

## 2024-06-06 DIAGNOSIS — S46.111S LABRAL TEAR OF LONG HEAD OF BICEPS TENDON, RIGHT, SEQUELA: Primary | ICD-10-CM

## 2024-06-06 PROCEDURE — 73020 X-RAY EXAM OF SHOULDER: CPT | Mod: RIGHT SIDE | Performed by: ORTHOPAEDIC SURGERY

## 2024-06-06 PROCEDURE — 99024 POSTOP FOLLOW-UP VISIT: CPT | Performed by: ORTHOPAEDIC SURGERY

## 2024-06-06 PROCEDURE — 73020 X-RAY EXAM OF SHOULDER: CPT | Mod: LT

## 2024-06-06 PROCEDURE — 73020 X-RAY EXAM OF SHOULDER: CPT | Mod: RT

## 2024-06-06 NOTE — PROGRESS NOTES
History of present illness 3 weeks status post right shoulder arthroscopy subacromial decompression and long head bicep tenodesis.  Patient did report that he sustained a fall while going up stairs as he slipped.  He had a fair amount of pain although it has dissipated.      Physical exam:      General: No acute distress or breathing difficulty or discomfort, pleasant and cooperative with the examination.    Extremities: Right shoulder incisions are clean dry all well-healing intact.  He is currently wearing the sling.  He is neurovascularly intact hand wrist and elbow.      Diagnostic studies: X-ray outlet view taken today of the right shoulder read by Dr. Derrick Todd    Impression: Status post right shoulder arthroscopy subacromial decompression labral debridement long of bicep tenodesis    Plan: Patient will now come out of the sling he is due to start physical therapy tomorrow.  Will work on range of motion program.  No resistance for 3 weeks.  Will see him in 4 weeks with a range of motion check.

## 2024-06-11 ENCOUNTER — TELEPHONE (OUTPATIENT)
Dept: ORTHOPEDIC SURGERY | Facility: CLINIC | Age: 29
End: 2024-06-11
Payer: COMMERCIAL

## 2024-06-11 ENCOUNTER — OFFICE VISIT (OUTPATIENT)
Dept: PRIMARY CARE CLINIC | Age: 29
End: 2024-06-11
Payer: COMMERCIAL

## 2024-06-11 VITALS
BODY MASS INDEX: 33.18 KG/M2 | HEART RATE: 90 BPM | DIASTOLIC BLOOD PRESSURE: 72 MMHG | OXYGEN SATURATION: 98 % | RESPIRATION RATE: 18 BRPM | HEIGHT: 69 IN | TEMPERATURE: 98.3 F | WEIGHT: 224 LBS | SYSTOLIC BLOOD PRESSURE: 130 MMHG

## 2024-06-11 DIAGNOSIS — I10 HYPERTENSION, UNSPECIFIED TYPE: Primary | ICD-10-CM

## 2024-06-11 DIAGNOSIS — G44.209 TENSION HEADACHE: ICD-10-CM

## 2024-06-11 PROBLEM — S43.431A SUPERIOR GLENOID LABRUM LESION OF RIGHT SHOULDER: Status: ACTIVE | Noted: 2023-10-12

## 2024-06-11 PROCEDURE — 3075F SYST BP GE 130 - 139MM HG: CPT | Performed by: INTERNAL MEDICINE

## 2024-06-11 PROCEDURE — 99214 OFFICE O/P EST MOD 30 MIN: CPT | Performed by: INTERNAL MEDICINE

## 2024-06-11 PROCEDURE — 3078F DIAST BP <80 MM HG: CPT | Performed by: INTERNAL MEDICINE

## 2024-06-11 RX ORDER — IBUPROFEN 200 MG
200 TABLET ORAL EVERY 6 HOURS
COMMUNITY

## 2024-06-11 RX ORDER — CYCLOBENZAPRINE HCL 10 MG
10 TABLET ORAL 3 TIMES DAILY PRN
COMMUNITY
Start: 2024-05-28 | End: 2024-06-15

## 2024-06-11 RX ORDER — OXYCODONE HYDROCHLORIDE AND ACETAMINOPHEN 5; 325 MG/1; MG/1
TABLET ORAL
COMMUNITY
Start: 2024-06-05

## 2024-06-11 RX ORDER — LIDOCAINE 50 MG/G
PATCH TOPICAL
COMMUNITY
Start: 2024-01-02

## 2024-06-11 ASSESSMENT — ENCOUNTER SYMPTOMS
NAUSEA: 0
COUGH: 0
VOMITING: 0
DIARRHEA: 0
SHORTNESS OF BREATH: 0
ABDOMINAL PAIN: 0
WHEEZING: 0

## 2024-06-11 NOTE — PROGRESS NOTES
Subjective:      Patient ID: Joey Dick is a 29 y.o. male    Post op f/u   HPI  Pt presents for post op follow up. Diagnosed with right labral tear of long head of biceps tendon--->4 weeks status post right shoulder arthroscopy subacromial decompression and long head bicep tenodesis. Pain controlled with full ROM. Tolerating PT well.     Wants to donate plasma. Told of heart murmur and asked to get clearance form completed by PCP.   Past Medical History:   Diagnosis Date    CAD (coronary artery disease)     murmur    Chronic pain     Hypertension     Low back pain      Past Surgical History:   Procedure Laterality Date    EYE SURGERY      laser    UPPER GASTROINTESTINAL ENDOSCOPY N/A 3/25/2019    EGD ESOPHAGOGASTRODUODENOSCOPY WITH DILATION performed by Elvin Leroy MD at [facility]    URETHRAL STRICTURE DILATATION      x2     Social History     Socioeconomic History    Marital status: Single     Spouse name: Not on file    Number of children: Not on file    Years of education: Not on file    Highest education level: Not on file   Occupational History    Not on file   Tobacco Use    Smoking status: Some Days     Types: Cigars    Smokeless tobacco: Never   Substance and Sexual Activity    Alcohol use: Yes     Comment: occassional    Drug use: No    Sexual activity: Yes     Partners: Female   Other Topics Concern    Not on file   Social History Narrative    Not on file     Social Determinants of Health     Financial Resource Strain: Low Risk  (3/20/2024)    Overall Financial Resource Strain (CARDIA)     Difficulty of Paying Living Expenses: Not hard at all   Food Insecurity: No Food Insecurity (3/20/2024)    Hunger Vital Sign     Worried About Running Out of Food in the Last Year: Never true     Ran Out of Food in the Last Year: Never true   Transportation Needs: Unknown (3/20/2024)    PRAPARE - Transportation     Lack of Transportation (Medical): Not on file     Lack of Transportation

## 2024-06-11 NOTE — TELEPHONE ENCOUNTER
Pt called and was asking for a refill on pain medication last fill 6/5/24 qty 28, sx labral repair 5/14/24

## 2024-06-12 DIAGNOSIS — S46.111S LABRAL TEAR OF LONG HEAD OF BICEPS TENDON, RIGHT, SEQUELA: ICD-10-CM

## 2024-06-12 RX ORDER — OXYCODONE AND ACETAMINOPHEN 5; 325 MG/1; MG/1
1 TABLET ORAL EVERY 8 HOURS PRN
Qty: 14 TABLET | Refills: 0 | Status: SHIPPED | OUTPATIENT
Start: 2024-06-12 | End: 2024-06-18 | Stop reason: SDUPTHER

## 2024-06-18 DIAGNOSIS — S46.111S LABRAL TEAR OF LONG HEAD OF BICEPS TENDON, RIGHT, SEQUELA: ICD-10-CM

## 2024-06-18 RX ORDER — OXYCODONE AND ACETAMINOPHEN 5; 325 MG/1; MG/1
1 TABLET ORAL EVERY 8 HOURS PRN
Qty: 14 TABLET | Refills: 0 | Status: SHIPPED | OUTPATIENT
Start: 2024-06-18 | End: 2024-06-25

## 2024-06-25 ENCOUNTER — TELEPHONE (OUTPATIENT)
Dept: ORTHOPEDIC SURGERY | Facility: CLINIC | Age: 29
End: 2024-06-25
Payer: COMMERCIAL

## 2024-06-26 DIAGNOSIS — S46.111S LABRAL TEAR OF LONG HEAD OF BICEPS TENDON, RIGHT, SEQUELA: ICD-10-CM

## 2024-06-26 RX ORDER — OXYCODONE AND ACETAMINOPHEN 5; 325 MG/1; MG/1
1 TABLET ORAL EVERY 8 HOURS PRN
Qty: 10 TABLET | Refills: 0 | Status: SHIPPED | OUTPATIENT
Start: 2024-06-26 | End: 2024-07-03

## 2024-07-10 ENCOUNTER — OFFICE VISIT (OUTPATIENT)
Dept: ORTHOPEDIC SURGERY | Facility: CLINIC | Age: 29
End: 2024-07-10
Payer: COMMERCIAL

## 2024-07-10 DIAGNOSIS — S46.111S LABRAL TEAR OF LONG HEAD OF BICEPS TENDON, RIGHT, SEQUELA: Primary | ICD-10-CM

## 2024-07-10 PROCEDURE — 99211 OFF/OP EST MAY X REQ PHY/QHP: CPT | Performed by: ORTHOPAEDIC SURGERY

## 2024-07-10 NOTE — PROGRESS NOTES
History of present illness patient is almost 2 months status post right shoulder arthroscopy extensive debridement of labrum and long head bicep tenodesis.  He has been performing physical therapy at [facility].  He is progressing in terms of range of motion but is still weak with resistance.      Physical exam:      General: No acute distress or breathing difficulty or discomfort, pleasant and cooperative with the examination.    Extremities: Right shoulder incisions are all well-healed intact forward flexion to 160 degrees abduction of 80 degrees external rotation 90 degrees internal rotation L4-5    Impression: Status post right shoulder arthroscopy long head bicep tenodesis    Plan: Patient will continue to work on conditioning and range of motion with [employer].  We anticipate him returning to work 8/14/2024 without restrictions.  We will see him again in 1 month for range of motion check.

## 2024-08-14 ENCOUNTER — OFFICE VISIT (OUTPATIENT)
Dept: ORTHOPEDIC SURGERY | Facility: CLINIC | Age: 29
End: 2024-08-14
Payer: COMMERCIAL

## 2024-08-14 DIAGNOSIS — S46.111S LABRAL TEAR OF LONG HEAD OF BICEPS TENDON, RIGHT, SEQUELA: Primary | ICD-10-CM

## 2024-08-14 PROCEDURE — 99211 OFF/OP EST MAY X REQ PHY/QHP: CPT | Performed by: ORTHOPAEDIC SURGERY

## 2024-08-14 NOTE — LETTER
August 14, 2024     Patient: Hair Lew   YOB: 1995   Date of Visit: 8/14/2024       To Whom It May Concern:    Hair Lew was seen in my clinic on 8/14/2024 at 9:45 am. Please excuse Hair for his absence from work on this day to make the appointment.  May return to work with no restrictions 8/14/24    If you have any questions or concerns, please don't hesitate to call.         Sincerely,         Derrick Todd MD        CC: No Recipients

## 2024-08-14 NOTE — PROGRESS NOTES
History of present illness: Patient here adjusted 3 months out from bicep labral repair and bicep tenodesis he is improved significantly    Physical exam:    General: No acute distress or breathing difficulty or discomfort, pleasant and cooperative with the examination.    Extremities: Forward flexion now back to 170 abduction to 70 external rotation to 80 good push pull good strength below shoulder he has full strength below shoulder    He is neuro intact he does feel some tightness in the bicep with abduction and external rotation in the throwers position only    There is no sulcus sign there is no instability    Ecchymosis bruising swelling edema is all resolved    Diagnostic studies: No new    Impression: Right shoulder function returning after labral debridement bicep tenodesis    Plan: Return to full work 8/14/2024 I will see him back if there is an issue or problem in the future

## 2024-11-22 ENCOUNTER — OFFICE VISIT (OUTPATIENT)
Dept: PRIMARY CARE CLINIC | Age: 29
End: 2024-11-22

## 2024-11-22 VITALS
WEIGHT: 224 LBS | HEIGHT: 69 IN | DIASTOLIC BLOOD PRESSURE: 80 MMHG | SYSTOLIC BLOOD PRESSURE: 106 MMHG | OXYGEN SATURATION: 97 % | RESPIRATION RATE: 18 BRPM | BODY MASS INDEX: 33.18 KG/M2 | HEART RATE: 80 BPM

## 2024-11-22 DIAGNOSIS — R10.11 RUQ ABDOMINAL PAIN: Primary | ICD-10-CM

## 2024-11-22 DIAGNOSIS — K59.00 CONSTIPATION, UNSPECIFIED CONSTIPATION TYPE: ICD-10-CM

## 2024-11-22 ASSESSMENT — ENCOUNTER SYMPTOMS
COUGH: 0
DIARRHEA: 0
WHEEZING: 0
SHORTNESS OF BREATH: 0
NAUSEA: 0
CONSTIPATION: 1
ABDOMINAL PAIN: 1
VOMITING: 0

## 2024-11-22 NOTE — PROGRESS NOTES
Subjective:      Patient ID: Joey Dick is a 29 y.o. male    Abdominal pain x 1 month   HPI  Pt presents with 1 month of RUQ crampy abd pan. Pain is rated 2/10, nonradiating, aggravated by eating and lying down. No NVD. Attests to hard stools. Currently on Senna. Diet is carb-laden. No fever or chills. No prior abd surgery.       Past Medical History:   Diagnosis Date    CAD (coronary artery disease)     murmur    Chronic pain     Hypertension     Low back pain      Past Surgical History:   Procedure Laterality Date    EYE SURGERY      laser    UPPER GASTROINTESTINAL ENDOSCOPY N/A 3/25/2019    EGD ESOPHAGOGASTRODUODENOSCOPY WITH DILATION performed by Elvin Leroy MD at [facility]    URETHRAL STRICTURE DILATATION      x2     Social History     Socioeconomic History    Marital status: Single     Spouse name: Not on file    Number of children: Not on file    Years of education: Not on file    Highest education level: Not on file   Occupational History    Not on file   Tobacco Use    Smoking status: Some Days     Types: Cigars    Smokeless tobacco: Never   Substance and Sexual Activity    Alcohol use: Yes     Comment: occassional    Drug use: No    Sexual activity: Yes     Partners: Female   Other Topics Concern    Not on file   Social History Narrative    Not on file     Social Determinants of Health     Financial Resource Strain: Low Risk  (3/20/2024)    Overall Financial Resource Strain (CARDIA)     Difficulty of Paying Living Expenses: Not hard at all   Food Insecurity: No Food Insecurity (3/20/2024)    Hunger Vital Sign     Worried About Running Out of Food in the Last Year: Never true     Ran Out of Food in the Last Year: Never true   Transportation Needs: Unknown (3/20/2024)    PRAPARE - Transportation     Lack of Transportation (Medical): Not on file     Lack of Transportation (Non-Medical): No   Physical Activity: Not on file   Stress: Not on file   Social Connections: Not on file   Intimate

## 2024-11-25 ENCOUNTER — HOSPITAL ENCOUNTER (OUTPATIENT)
Dept: RADIOLOGY | Facility: HOSPITAL | Age: 29
Discharge: HOME | End: 2024-11-25
Payer: COMMERCIAL

## 2024-11-25 DIAGNOSIS — R10.11 RIGHT UPPER QUADRANT PAIN: ICD-10-CM

## 2024-11-25 PROCEDURE — 76705 ECHO EXAM OF ABDOMEN: CPT | Performed by: RADIOLOGY

## 2024-11-25 PROCEDURE — 76705 ECHO EXAM OF ABDOMEN: CPT

## 2024-11-30 DIAGNOSIS — G43.019 INTRACTABLE MIGRAINE WITHOUT AURA AND WITHOUT STATUS MIGRAINOSUS: ICD-10-CM

## 2024-12-03 RX ORDER — AMITRIPTYLINE HYDROCHLORIDE 10 MG/1
TABLET ORAL
Qty: 270 TABLET | Refills: 1 | Status: SHIPPED | OUTPATIENT
Start: 2024-12-03

## 2024-12-23 ENCOUNTER — OFFICE VISIT (OUTPATIENT)
Dept: PRIMARY CARE CLINIC | Age: 29
End: 2024-12-23

## 2024-12-23 VITALS
DIASTOLIC BLOOD PRESSURE: 70 MMHG | BODY MASS INDEX: 32.93 KG/M2 | TEMPERATURE: 98.6 F | RESPIRATION RATE: 18 BRPM | HEART RATE: 77 BPM | SYSTOLIC BLOOD PRESSURE: 102 MMHG | WEIGHT: 223 LBS | OXYGEN SATURATION: 99 %

## 2024-12-23 DIAGNOSIS — L23.9 ALLERGIC CONTACT DERMATITIS, UNSPECIFIED TRIGGER: ICD-10-CM

## 2024-12-23 DIAGNOSIS — K59.00 CONSTIPATION, UNSPECIFIED CONSTIPATION TYPE: Primary | ICD-10-CM

## 2024-12-23 RX ORDER — TRIAMCINOLONE ACETONIDE 1 MG/G
OINTMENT TOPICAL 2 TIMES DAILY
Qty: 1 EACH | Refills: 3 | Status: SHIPPED | OUTPATIENT
Start: 2024-12-23 | End: 2025-01-06

## 2024-12-23 SDOH — ECONOMIC STABILITY: FOOD INSECURITY: WITHIN THE PAST 12 MONTHS, YOU WORRIED THAT YOUR FOOD WOULD RUN OUT BEFORE YOU GOT MONEY TO BUY MORE.: NEVER TRUE

## 2024-12-23 SDOH — ECONOMIC STABILITY: FOOD INSECURITY: WITHIN THE PAST 12 MONTHS, THE FOOD YOU BOUGHT JUST DIDN'T LAST AND YOU DIDN'T HAVE MONEY TO GET MORE.: NEVER TRUE

## 2024-12-23 SDOH — ECONOMIC STABILITY: INCOME INSECURITY: HOW HARD IS IT FOR YOU TO PAY FOR THE VERY BASICS LIKE FOOD, HOUSING, MEDICAL CARE, AND HEATING?: NOT HARD AT ALL

## 2024-12-23 ASSESSMENT — ENCOUNTER SYMPTOMS
SHORTNESS OF BREATH: 0
COLOR CHANGE: 1
COUGH: 0
VOMITING: 0
DIARRHEA: 0
ABDOMINAL PAIN: 0
NAUSEA: 0
WHEEZING: 0

## 2024-12-23 ASSESSMENT — PATIENT HEALTH QUESTIONNAIRE - PHQ9
2. FEELING DOWN, DEPRESSED OR HOPELESS: NOT AT ALL
SUM OF ALL RESPONSES TO PHQ QUESTIONS 1-9: 0
SUM OF ALL RESPONSES TO PHQ QUESTIONS 1-9: 0
1. LITTLE INTEREST OR PLEASURE IN DOING THINGS: NOT AT ALL
SUM OF ALL RESPONSES TO PHQ9 QUESTIONS 1 & 2: 0
SUM OF ALL RESPONSES TO PHQ QUESTIONS 1-9: 0
SUM OF ALL RESPONSES TO PHQ QUESTIONS 1-9: 0

## 2024-12-23 NOTE — PROGRESS NOTES
Subjective:      Patient ID: Joey Dick is a 29 y.o. male    Follow up   HPI  Pt presents for follow up regarding abd pain due to constipation. Resolved on stool softener. Metamucil unavailable at [facility]. US showed no cholelithiasis and fatty liver. Normal LFT.    Dry itchy hands x 6 months. Attests to regular chemical exposure at work.     Past Medical History:   Diagnosis Date    CAD (coronary artery disease)     murmur    Chronic pain     Hypertension     Low back pain      Past Surgical History:   Procedure Laterality Date    EYE SURGERY      laser    UPPER GASTROINTESTINAL ENDOSCOPY N/A 3/25/2019    EGD ESOPHAGOGASTRODUODENOSCOPY WITH DILATION performed by Elvin Leroy MD at [facility]    URETHRAL STRICTURE DILATATION      x2     Social History     Socioeconomic History    Marital status: Single     Spouse name: Not on file    Number of children: Not on file    Years of education: Not on file    Highest education level: Not on file   Occupational History    Not on file   Tobacco Use    Smoking status: Some Days     Types: Cigars    Smokeless tobacco: Never   Substance and Sexual Activity    Alcohol use: Yes     Comment: occassional    Drug use: No    Sexual activity: Yes     Partners: Female   Other Topics Concern    Not on file   Social History Narrative    Not on file     Social Determinants of Health     Financial Resource Strain: Low Risk  (12/23/2024)    Overall Financial Resource Strain (CARDIA)     Difficulty of Paying Living Expenses: Not hard at all   Food Insecurity: No Food Insecurity (12/23/2024)    Hunger Vital Sign     Worried About Running Out of Food in the Last Year: Never true     Ran Out of Food in the Last Year: Never true   Transportation Needs: Unknown (12/23/2024)    PRAPARE - Transportation     Lack of Transportation (Medical): Not on file     Lack of Transportation (Non-Medical): No   Physical Activity: Not on file   Stress: Not on file   Social Connections: Not on file

## 2025-01-07 ENCOUNTER — OFFICE VISIT (OUTPATIENT)
Dept: ORTHOPEDIC SURGERY | Facility: CLINIC | Age: 30
End: 2025-01-07
Payer: COMMERCIAL

## 2025-01-07 ENCOUNTER — HOSPITAL ENCOUNTER (OUTPATIENT)
Dept: RADIOLOGY | Facility: CLINIC | Age: 30
Discharge: HOME | End: 2025-01-07
Payer: COMMERCIAL

## 2025-01-07 DIAGNOSIS — S60.552A FOREIGN BODY OF HAND, LEFT, INITIAL ENCOUNTER: ICD-10-CM

## 2025-01-07 PROCEDURE — 73130 X-RAY EXAM OF HAND: CPT | Mod: LT

## 2025-01-07 PROCEDURE — 99214 OFFICE O/P EST MOD 30 MIN: CPT | Performed by: ORTHOPAEDIC SURGERY

## 2025-01-07 PROCEDURE — 73130 X-RAY EXAM OF HAND: CPT | Mod: LEFT SIDE | Performed by: ORTHOPAEDIC SURGERY

## 2025-01-07 NOTE — PROGRESS NOTES
"    1/7/2025    Chief Complaint   Patient presents with    Left Hand - New Patient Visit     FB  Xrays today        History of Present Illness:  Patient Hair Lew , 29 y.o. male, presents today, 1/7/2025, for evaluation of left hand pain and foreign body .  He states in childhood he had a fall about 15 years ago where he scraped his palm substantially on a hard concrete surface and felt like he had retained foreign body which he describes as \"rocks\" in the palm.  He states that originally they were very deep they were not painful they did not cause him any issues up until a couple months ago when he started to have pain and he felt that they were migrating towards the surface.  Today he has palpable mass that he equates to foreign body that is very troublesome.  He is a left-hand-dominant individual otherwise healthy.  However, due to his left hand status he feels he is becoming more painful causing functional difficulties.       Review of Systems:   GENERAL: Negative  GI: Negative  MUSCULOSKELETAL: See HPI  SKIN: Negative  NEURO:  Negative     Physical Exam:  GENERAL:  Alert and oriented to person, place, and time.  No acute distress and breathing comfortably; pleasant and cooperative with the examination.  HEENT:  Head is normocephalic and atraumatic.  NECK:  Supple, no visible swelling.  CARDIOVASCULAR:  No palpable tachycardia.  LUNGS:  No audible wheezing or labored breathing.  ABDOMEN:  Nondistended.  Extremities: Evaluation of left upper extremity finds the patient to have a palpable radial artery at the wrist with brisk capillary refill to all digits. The patient has intact sensorium to axillary, radial, median and ulnar nerves. There are no open wounds. There are no signs of infection. There is no evidence of lymphedema or lymphatic streaking. The patient has supple compartments of the left arm, forearm and hand.  2 palpable foreign bodies in the palm of the left hand just radial to the midline over " the thenar aspect.  They are mildly tender to palpation.  They are not freely mobile.  There is no associated fluid collection or erythema surrounding them.     Imaging/Test Results:  3 views of the hand show no acute fracture or dislocation.     Assessment:  Suspected foreign body of the left palm x 2.     Plan:  Treatment options were discussed including both operative and non-operative treatment strategies.  Patient elects to proceed forth with surgical intervention by way of left foreign body removal under wide-awake approach to anesthesia.  Risks, benefits, and alternatives to surgery were discussed including, but not limited to infection risk, persistent or incomplete relief of pain, swelling, or stiffness, and post-operative pain and discomfort.  The patient verbalized agreement and understanding of the plan for care.  All questions answered at today's visit.  Plan for follow-up 10-14 days post-op.    In a face to face encounter, I performed a history and physical examination, discussed pertinent diagnostic studies if indicated, and discussed diagnosis and management strategies with both the patient and the mid-level provider. I reviewed the mid-level's note and agree with the documented findings and plan of care.  Patient presents status post remote injury to the left palm.  He perceives retained foreign body that has gotten more irritating over time.  X-rays demonstrate zones of calcification certainly consistent with foreign body.  Treatment options were discussed.  Patient elects for open exploration of his previous penetrating trauma with removal of presumed retained foreign body.  These are palpable on today's exam along the base of the thenar.  Plan for wide-awake approach to anesthesia.

## 2025-01-09 ENCOUNTER — OFFICE VISIT (OUTPATIENT)
Dept: ORTHOPEDIC SURGERY | Facility: CLINIC | Age: 30
End: 2025-01-09
Payer: COMMERCIAL

## 2025-01-09 ENCOUNTER — HOSPITAL ENCOUNTER (OUTPATIENT)
Dept: RADIOLOGY | Facility: CLINIC | Age: 30
Discharge: HOME | End: 2025-01-09
Payer: COMMERCIAL

## 2025-01-09 DIAGNOSIS — S43.402A SPRAIN OF LEFT SHOULDER, INITIAL ENCOUNTER: Primary | ICD-10-CM

## 2025-01-09 DIAGNOSIS — M25.512 ACUTE PAIN OF LEFT SHOULDER: ICD-10-CM

## 2025-01-09 DIAGNOSIS — S40.012A CONTUSION OF LEFT SHOULDER, INITIAL ENCOUNTER: ICD-10-CM

## 2025-01-09 PROCEDURE — 73030 X-RAY EXAM OF SHOULDER: CPT | Mod: LT

## 2025-01-09 PROCEDURE — 99214 OFFICE O/P EST MOD 30 MIN: CPT | Performed by: FAMILY MEDICINE

## 2025-01-09 RX ORDER — NAPROXEN 500 MG/1
500 TABLET ORAL
Qty: 28 TABLET | Refills: 0 | Status: SHIPPED | OUTPATIENT
Start: 2025-01-09 | End: 2025-01-23

## 2025-01-09 RX ORDER — METHYLPREDNISOLONE 4 MG/1
TABLET ORAL
Qty: 1 EACH | Refills: 0 | Status: SHIPPED | OUTPATIENT
Start: 2025-01-09
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
## 2025-01-23 DIAGNOSIS — G89.18 POST-OP PAIN: Primary | ICD-10-CM

## 2025-01-23 RX ORDER — HYDROCODONE BITARTRATE AND ACETAMINOPHEN 5; 325 MG/1; MG/1
1 TABLET ORAL EVERY 8 HOURS PRN
Qty: 6 TABLET | Refills: 0 | Status: SHIPPED | OUTPATIENT
Start: 2025-01-23 | End: 2025-01-25

## 2025-01-24 PROCEDURE — 10120 INC&RMVL FB SUBQ TISS SMPL: CPT | Performed by: ORTHOPAEDIC SURGERY

## 2025-01-27 ENCOUNTER — OFFICE VISIT (OUTPATIENT)
Dept: ORTHOPEDIC SURGERY | Facility: CLINIC | Age: 30
End: 2025-01-27
Payer: COMMERCIAL

## 2025-01-27 DIAGNOSIS — S60.552A FOREIGN BODY OF HAND, LEFT, INITIAL ENCOUNTER: Primary | ICD-10-CM

## 2025-01-27 PROCEDURE — 99211 OFF/OP EST MAY X REQ PHY/QHP: CPT | Performed by: PHYSICIAN ASSISTANT

## 2025-01-27 NOTE — PROGRESS NOTES
Hair Lew is a 30 y.o. male who presents for wound check.    HPI:  30-year-old gentleman here at the walk-in clinic for a wound check.  He denies any fever chills nausea vomiting night sweats.  He has no bowel or bladder complaints.    Assessment:  30-year-old gentleman presents to the walk-in clinic for wound check.  He recently underwent a left hand foreign body removal with Dr. Souza.  He slipped and fell over the weekend on his outstretched left hand, he was concerned that he may have popped a stitch.  He is not describing any significant pain.  Examination of his hands show an intact incision and intact sutures, there may be an inferior portion of the incision where a stitch did come out, but it is very hard to tell since the incision is so small.  There is no sign of any damage to the incision or dehiscence.  He felt like it was better to come and be evaluated just to make sure he did not damage his surgery site.    Plan:  We will dress the incision with a bandage, and he will keep his regularly scheduled follow-up appointment with Dr. Souza.    Parag Park PA-C

## 2025-01-27 NOTE — PROGRESS NOTES
Acute Injury New Patient Visit    CC: No chief complaint on file.      HPI: Hair is a 30 y.o. male presents today for evaluation for skin check status post         Review of Systems   GENERAL: Negative for malaise, significant weight loss, fever  MUSCULOSKELETAL: See HPI  NEURO:  Negative for numbness / tingling     Past Medical History  Past Medical History:   Diagnosis Date    Back pain     Constipation     Eczema     GERD (gastroesophageal reflux disease)     Hypertension     Murmur     Personal history of other diseases of the circulatory system     History of essential hypertension    Personal history of other diseases of the musculoskeletal system and connective tissue     History of arthritis    Personal history of other diseases of the respiratory system     History of asthma    Snores        Medication review  Medication Documentation Review Audit       Reviewed by Cole C Budinsky, MD (Physician) on 25 at 1416      Medication Order Taking? Sig Documenting Provider Last Dose Status   acetaminophen (Tylenol) 500 mg capsule [id number] No Take 1 capsule (500 mg) by mouth every 6 hours if needed for mild pain (1 - 3). Historical Provider, MD Past Week Active   amitriptyline (Elavil) 10 mg tablet [id number] No Take 3 tablets (30 mg) by mouth once daily at bedtime. Historical Provider, MD 2024 Active   cyclobenzaprine (Flexeril) 10 mg tablet [id number]  Take 1 tablet (10 mg) by mouth 3 times a day as needed for muscle spasms for up to 10 days. Derrick Todd MD   24 2359   cyclobenzaprine (Flexeril) 10 mg tablet [id number]  Take 1 tablet (10 mg) by mouth 3 times a day as needed for muscle spasms for up to 10 days. Derrick Todd MD   06/15/24 2359   docusate sodium (Stool Softener) 100 mg tablet [id number] No Take 1 tablet (100 mg) by mouth once daily. Historical Provider, MD 2024 Active   ibuprofen 200 mg tablet [id number] No Take 1 tablet (200 mg) by mouth every 6 hours.  Historical Provider, MD Unknown Active   lidocaine (Lidoderm) 5 % patch [id number] No PLACE 1 PATCH FOR 12 HRS ON THE SKIN ONCE DAILY. REMOVE & DISCARD PATCH WITHIN 12 HRS OR AS DIRECTED James Menendez MD Past Month Active   lisinopril 10 mg tablet [id number] No Take by mouth. Historical Provider, MD 5/13/2024 Active   omeprazole (PriLOSEC) 40 mg DR capsule [id number] No Take by mouth. Historical Provider, MD 5/13/2024 Active   predniSONE (Deltasone) 10 mg tablet [id number] No 40MG FOR 2 DAYS, 30MG FOR 2 DAYS, 20MG FOR 2 DAYS, 10MG FOR 2 DAYS   Patient not taking: Reported on 5/13/2024    James Menendez MD Not Taking Active                    Allergies  No Known Allergies    Social History  Social History     Socioeconomic History    Marital status: Single     Spouse name: Not on file    Number of children: Not on file    Years of education: Not on file    Highest education level: Not on file   Occupational History    Not on file   Tobacco Use    Smoking status: Some Days     Types: Cigars    Smokeless tobacco: Never   Vaping Use    Vaping status: Never Used   Substance and Sexual Activity    Alcohol use: Yes     Comment: socially    Drug use: Never    Sexual activity: Defer   Other Topics Concern    Not on file   Social History Narrative    Not on file     Social Drivers of Health     Financial Resource Strain: Low Risk  (12/23/2024)    Received from [facility]    Overall Financial Resource Strain (CARDIA)     Difficulty of Paying Living Expenses: Not hard at all   Food Insecurity: No Food Insecurity (12/23/2024)    Received from [facility]    Hunger Vital Sign     Worried About Running Out of Food in the Last Year: Never true     Ran Out of Food in the Last Year: Never true   Transportation Needs: Unknown (12/23/2024)    Received from [facility]    PRAPARE - Transportation     Lack of Transportation (Medical): Not on file     Lack of Transportation  (Non-Medical): No   Physical Activity: Not on file   Stress: Not on file   Social Connections: Not on file   Intimate Partner Violence: Not on file   Housing Stability: Unknown (12/23/2024)    Received from [facility]    Housing Stability Vital Sign     Unable to Pay for Housing in the Last Year: Not on file     Number of Times Moved in the Last Year: Not on file     Homeless in the Last Year: No       Surgical History  Past Surgical History:   Procedure Laterality Date    ESOPHAGOGASTRODUODENOSCOPY      REFRACTIVE SURGERY      SPINE SURGERY      URETHRA SURGERY      Urethral dilation    XR CHEST PACEMAKER WITH FLUORO  06/03/2019    XR CHEST PACEMAKER WITH FLUORO 6/3/2019 [facility]       Physical Exam:  GENERAL:  Patient is awake, alert, and oriented to person place and time.  Patient appears well nourished and well kept.  Affect Calm, Not Acutely Distressed.  HEENT:  Normocephalic, Atraumatic, EOMI  CARDIOVASCULAR:  Hemodynamically stable.  RESPIRATORY:  Normal respirations with unlabored breathing.  Extremity: ***      Diagnostics: ***  XR shoulder left 2+ views  Narrative: Interpreted By:  Budinsky, Cole,   STUDY:  XR SHOULDER LEFT 2+ VIEWS; ;  1/9/2025 1:26 pm      INDICATION:  Signs/Symptoms:pain.      ACCESSION NUMBER(S):  [id number]      ORDERING CLINICIAN:  COLE BUDINSKY      Impression: Four views left shoulder demonstrate no presence for acute fracture  or dislocation. Normal anatomic spacing alignment noted.          Signed by: Cole Budinsky 1/9/2025 5:41 PM  Dictation workstation:   [id number]      Procedure: ***    Assessment: ***    Plan: Hair ***    No orders of the defined types were placed in this encounter.     At the conclusion of the visit there were no further questions by the patient/family regarding their plan of care.  Patient was instructed to call or return with any issues, questions, or concerns regarding their injury and/or treatment plan described  above.     01/27/25 at 1:42 PM - James Menendez MD    Office: [phone]    This note was prepared using voice recognition software.  The details of this note are correct and have been reviewed, and corrected to the best of my ability.  Some grammatical errors may persist related to the Dragon software.

## 2025-02-04 ENCOUNTER — OFFICE VISIT (OUTPATIENT)
Dept: ORTHOPEDIC SURGERY | Facility: CLINIC | Age: 30
End: 2025-02-04
Payer: COMMERCIAL

## 2025-02-04 DIAGNOSIS — S60.552A FOREIGN BODY OF HAND, LEFT, INITIAL ENCOUNTER: Primary | ICD-10-CM

## 2025-02-04 PROCEDURE — 99211 OFF/OP EST MAY X REQ PHY/QHP: CPT | Performed by: ORTHOPAEDIC SURGERY

## 2025-02-04 NOTE — PROGRESS NOTES
2/4/2025    Chief Complaint   Patient presents with    Left Hand - Post-op     Lt palm foreign body removal  DOS 1/24/25       History of Present Illness:  Patient Hair Lew , 30 y.o. male, presents today, 2/4/2025, for evaluation of left hand  foreign body removal, about 2 weeks postop.  He states he is done well in the interim since surgery.  He did have a fall about 3 days after surgery and popped one of the stitches open.  Since then the stitches have been removed.  However, the wound is healed well.  Minimal soreness discomfort overall.  He was originally scheduled remain off work until 2/20/2025 but wants to return early .         Review of Systems:   GENERAL: Negative  GI: Negative  MUSCULOSKELETAL: See HPI  SKIN: Negative  NEURO:  Negative     Physical Exam:  GENERAL:  Alert and oriented to person, place, and time.  No acute distress and breathing comfortably; pleasant and cooperative with the examination.  HEENT:  Head is normocephalic and atraumatic.  NECK:  Supple, no visible swelling.  CARDIOVASCULAR:  No palpable tachycardia.  LUNGS:  No audible wheezing or labored breathing.  ABDOMEN:  Nondistended.  Extremities: The surgical incision is clean, dry, intact, and appears to be healing well.  No active bleeding, erythema, warmth, drainage, or signs of infection.  Appropriate functional ROM demonstrated with flexion/extension of the digits, and flexion/extension/pronosupination of the wrist.     Imaging/Test Results:  None today.  Specimen was given to the patient not sent for pathology.     Assessment:  Left palm foreign body removal x 2, 2 weeks postop doing well.     Plan:  Sutures were removed in the office today.  The patient can begin to weight bear as tolerated.  We discussed and reviewed home exercise program for range of motion recovery, scar massage, and desensitization techniques.  They can return to activities as tolerated.  The patient will follow-up with our office on an as needed  basis.  All patient questions answered at today's visit.  Will give him a note to return to work on 2/11/2025 without restrictions and update his short-term disability paperwork as well.    Akiko Schmid PA-C

## 2025-02-04 NOTE — LETTER
February 4, 2025     Patient: Hair Lew   YOB: 1995   Date of Visit: 2/4/2025       To Whom It May Concern:    It is my medical opinion that Hair Lew may return to work on 2/11/2025 .    If you have any questions or concerns, please don't hesitate to call.         Sincerely,        Clive Souza, DO

## 2025-02-06 ENCOUNTER — OFFICE VISIT (OUTPATIENT)
Dept: ORTHOPEDIC SURGERY | Facility: CLINIC | Age: 30
End: 2025-02-06
Payer: COMMERCIAL

## 2025-02-06 ENCOUNTER — HOSPITAL ENCOUNTER (OUTPATIENT)
Dept: RADIOLOGY | Facility: EXTERNAL LOCATION | Age: 30
Discharge: HOME | End: 2025-02-06

## 2025-02-06 ENCOUNTER — HOSPITAL ENCOUNTER (OUTPATIENT)
Dept: RADIOLOGY | Facility: CLINIC | Age: 30
Discharge: HOME | End: 2025-02-06
Payer: COMMERCIAL

## 2025-02-06 DIAGNOSIS — M89.8X2 PAIN OF RIGHT HUMERUS: ICD-10-CM

## 2025-02-06 DIAGNOSIS — M25.511 ACUTE PAIN OF RIGHT SHOULDER: Primary | ICD-10-CM

## 2025-02-06 DIAGNOSIS — M25.511 ACUTE PAIN OF RIGHT SHOULDER: ICD-10-CM

## 2025-02-06 PROCEDURE — 73060 X-RAY EXAM OF HUMERUS: CPT | Mod: RT

## 2025-02-06 PROCEDURE — 99214 OFFICE O/P EST MOD 30 MIN: CPT | Performed by: FAMILY MEDICINE

## 2025-02-06 PROCEDURE — 20611 DRAIN/INJ JOINT/BURSA W/US: CPT | Mod: RT | Performed by: FAMILY MEDICINE

## 2025-02-06 PROCEDURE — 73030 X-RAY EXAM OF SHOULDER: CPT | Mod: RT

## 2025-02-06 PROCEDURE — 2500000004 HC RX 250 GENERAL PHARMACY W/ HCPCS (ALT 636 FOR OP/ED): Performed by: FAMILY MEDICINE

## 2025-02-06 RX ORDER — CYCLOBENZAPRINE HCL 5 MG
5 TABLET ORAL NIGHTLY
Qty: 14 TABLET | Refills: 0 | Status: SHIPPED | OUTPATIENT
Start: 2025-02-06 | End: 2025-02-20

## 2025-02-06 RX ORDER — LIDOCAINE HYDROCHLORIDE 10 MG/ML
6 INJECTION, SOLUTION INFILTRATION; PERINEURAL
Status: COMPLETED | OUTPATIENT
Start: 2025-02-06 | End: 2025-02-06

## 2025-02-06 RX ORDER — BETAMETHASONE SODIUM PHOSPHATE AND BETAMETHASONE ACETATE 3; 3 MG/ML; MG/ML
12 INJECTION, SUSPENSION INTRA-ARTICULAR; INTRALESIONAL; INTRAMUSCULAR; SOFT TISSUE
Status: COMPLETED | OUTPATIENT
Start: 2025-02-06 | End: 2025-02-06

## 2025-02-06 RX ADMIN — BETAMETHASONE SODIUM PHOSPHATE AND BETAMETHASONE ACETATE 12 MG: 3; 3 INJECTION, SUSPENSION INTRA-ARTICULAR; INTRALESIONAL; INTRAMUSCULAR at 12:33

## 2025-02-06 RX ADMIN — LIDOCAINE HYDROCHLORIDE 6 ML: 10 INJECTION, SOLUTION INFILTRATION; PERINEURAL at 12:33

## 2025-02-06 NOTE — PROGRESS NOTES
Acute Injury New Patient Visit    CC: No chief complaint on file.      HPI: Hair is a 30 y.o.male who presents today with new complaints of acute lateral sided right shoulder pain status post fall out of bed.  He states he had fallen off of his bed about 3 feet or so had some bruising to the inside of the forearm which is calm down he has some residual pain to the outside of his shoulder.  He request an injection here today for the 3 days of worsening pain.  He denies any numbness tingling or burning.        Review of Systems   GENERAL: Negative for malaise, significant weight loss, fever  MUSCULOSKELETAL: See HPI  NEURO: Negative for numbness / tingling     Past Medical History  Past Medical History:   Diagnosis Date    Back pain     Constipation     Eczema     GERD (gastroesophageal reflux disease)     Hypertension     Murmur     Personal history of other diseases of the circulatory system     History of essential hypertension    Personal history of other diseases of the musculoskeletal system and connective tissue     History of arthritis    Personal history of other diseases of the respiratory system     History of asthma    Snores        Medication review  Medication Documentation Review Audit       Reviewed by Cole C Budinsky, MD (Physician) on 25 at 1231      Medication Order Taking? Sig Documenting Provider Last Dose Status   acetaminophen (Tylenol) 500 mg capsule [id number] No Take 1 capsule (500 mg) by mouth every 6 hours if needed for mild pain (1 - 3). Historical Provider, MD Past Week Active   amitriptyline (Elavil) 10 mg tablet [id number] No Take 3 tablets (30 mg) by mouth once daily at bedtime. Historical Provider, MD 2024 Active   cyclobenzaprine (Flexeril) 10 mg tablet [id number]  Take 1 tablet (10 mg) by mouth 3 times a day as needed for muscle spasms for up to 10 days. Derrick Todd MD   24 4021   cyclobenzaprine (Flexeril) 10 mg tablet [id number]  Take 1 tablet (10 mg)  by mouth 3 times a day as needed for muscle spasms for up to 10 days. Derrick Todd MD   06/15/24 1664   docusate sodium (Stool Softener) 100 mg tablet [id number] No Take 1 tablet (100 mg) by mouth once daily. Historical Provider, MD 2024 Active   ibuprofen 200 mg tablet [id number] No Take 1 tablet (200 mg) by mouth every 6 hours. Historical Provider, MD Unknown Active   lidocaine (Lidoderm) 5 % patch [id number] No PLACE 1 PATCH FOR 12 HRS ON THE SKIN ONCE DAILY. REMOVE & DISCARD PATCH WITHIN 12 HRS OR AS DIRECTED James Menendez MD Past Month Active   lisinopril 10 mg tablet [id number] No Take by mouth. Historical Provider, MD 2024 Active   methylPREDNISolone (Medrol Dospak) 4 mg tablets [id number]  Follow schedule on package instructions Cole C Budinsky, MD  Active   omeprazole (PriLOSEC) 40 mg DR capsule [id number] No Take by mouth. Historical Provider, MD 2024 Active   predniSONE (Deltasone) 10 mg tablet [id number] No 40MG FOR 2 DAYS, 30MG FOR 2 DAYS, 20MG FOR 2 DAYS, 10MG FOR 2 DAYS   Patient not taking: Reported on 2024    James Menendez MD Not Taking Active                    Allergies  No Known Allergies    Social History  Social History     Socioeconomic History    Marital status: Single     Spouse name: Not on file    Number of children: Not on file    Years of education: Not on file    Highest education level: Not on file   Occupational History    Not on file   Tobacco Use    Smoking status: Some Days     Types: Cigars    Smokeless tobacco: Never   Vaping Use    Vaping status: Never Used   Substance and Sexual Activity    Alcohol use: Yes     Comment: socially    Drug use: Never    Sexual activity: Defer   Other Topics Concern    Not on file   Social History Narrative    Not on file     Social Drivers of Health     Financial Resource Strain: Low Risk  (2024)    Received from [facility]    Overall Financial Resource Strain (CARDIA)     Difficulty of  Paying Living Expenses: Not hard at all   Food Insecurity: No Food Insecurity (12/23/2024)    Received from [facility]    Hunger Vital Sign     Worried About Running Out of Food in the Last Year: Never true     Ran Out of Food in the Last Year: Never true   Transportation Needs: Unknown (12/23/2024)    Received from [facility]    PRAPARE - Transportation     Lack of Transportation (Medical): Not on file     Lack of Transportation (Non-Medical): No   Physical Activity: Not on file   Stress: Not on file   Social Connections: Not on file   Intimate Partner Violence: Not on file   Housing Stability: Unknown (12/23/2024)    Received from [facility]    Housing Stability Vital Sign     Unable to Pay for Housing in the Last Year: Not on file     Number of Times Moved in the Last Year: Not on file     Homeless in the Last Year: No       Surgical History  Past Surgical History:   Procedure Laterality Date    ESOPHAGOGASTRODUODENOSCOPY      REFRACTIVE SURGERY      SPINE SURGERY      URETHRA SURGERY      Urethral dilation    XR CHEST PACEMAKER WITH FLUORO  06/03/2019    XR CHEST PACEMAKER WITH FLUORO 6/3/2019 [facility]       Physical Exam:  GENERAL:  Patient is awake, alert, and oriented to person place and time.  Patient appears well nourished and well kept.  Affect Calm, Not Acutely Distressed.  HEENT:  Normocephalic, Atraumatic, EOMI  CARDIOVASCULAR:  Hemodynamically stable.  RESPIRATORY:  Normal respirations with unlabored breathing.  NEURO: Gross sensation intact to the upper extremities bilaterally.  Extremity: Right shoulder demonstrates soft tissue swelling tenderness over the subacromial bursa tenderness palpation only limited to that area with a minimal amount at the biceps tendon proximally.  He has full forward flexion lateral abduction 5 out of 5 strength with resisted supraspinatus testing as well as negative Neer's Sagastume and  Mono's.  Equivocal speeds and Yergason's with minimal proximal biceps pain forearm compartment soft compressible soft tissue swelling and bruise noted to the volar forearm.  Full range of motion and strength otherwise about the right upper extremity.      Diagnostics: X-rays today demonstrate no presence for acute fracture or dislocation        Procedure: Subacromial bursa injection right shoulder as below  L Inj/Asp: R subacromial bursa on 2/6/2025 12:33 PM  Indications: pain  Details: 22 G needle, ultrasound-guided superolateral approach  Medications: 6 mL lidocaine 10 mg/mL (1 %); 12 mg betamethasone acet,sod phos 6 mg/mL  Outcome: tolerated well, no immediate complications  Procedure, treatment alternatives, risks and benefits explained, specific risks discussed. Consent was given by the patient. Immediately prior to procedure a time out was called to verify the correct patient, procedure, equipment, support staff and site/side marked as required. Patient was prepped and draped in the usual sterile fashion.           Assessment:   Problem List Items Addressed This Visit    None  Visit Diagnoses       Acute pain of right shoulder    -  Primary    Relevant Medications    cyclobenzaprine (Flexeril) 5 mg tablet    Other Relevant Orders    XR shoulder right 2+ views    Point of Care Ultrasound (Completed)    Pain of right humerus        Relevant Medications    cyclobenzaprine (Flexeril) 5 mg tablet    Other Relevant Orders    XR humerus right    Point of Care Ultrasound (Completed)             Plan: Patient received and tolerated the injection well had modest immediate symptomatic relief.  Will plan to see him back as needed going forward with any persistent or worsening issues or concerns will consider further evaluation with an MRI if necessary.  He was encouraged ice and was given home exercises for range of motion and strength recovery.  Muscle relaxer provided for any nocturnal symptomatic relief.  Orders  Placed This Encounter    L Inj/Asp    XR shoulder right 2+ views    XR humerus right    Point of Care Ultrasound    cyclobenzaprine (Flexeril) 5 mg tablet      At the conclusion of the visit there were no further questions by the patient/family regarding their plan of care.  Patient was instructed to call or return with any issues, questions, or concerns regarding their injury and/or treatment plan described above.     02/06/25 at 12:33 PM - Cole C Budinsky, MD    Office: [phone]    This note was prepared using voice recognition software.  The details of this note are correct and have been reviewed, and corrected to the best of my ability.  Some grammatical errors may persist related to the Dragon software.

## 2025-02-09 ENCOUNTER — HOSPITAL ENCOUNTER (EMERGENCY)
Facility: HOSPITAL | Age: 30
Discharge: HOME | End: 2025-02-09
Payer: COMMERCIAL

## 2025-02-09 VITALS
WEIGHT: 225 LBS | DIASTOLIC BLOOD PRESSURE: 79 MMHG | HEIGHT: 69 IN | TEMPERATURE: 97.5 F | SYSTOLIC BLOOD PRESSURE: 169 MMHG | OXYGEN SATURATION: 98 % | BODY MASS INDEX: 33.33 KG/M2 | HEART RATE: 88 BPM | RESPIRATION RATE: 18 BRPM

## 2025-02-09 DIAGNOSIS — M25.511 RIGHT SHOULDER PAIN, UNSPECIFIED CHRONICITY: Primary | ICD-10-CM

## 2025-02-09 PROCEDURE — 96372 THER/PROPH/DIAG INJ SC/IM: CPT | Performed by: REGISTERED NURSE

## 2025-02-09 PROCEDURE — 2500000004 HC RX 250 GENERAL PHARMACY W/ HCPCS (ALT 636 FOR OP/ED): Performed by: REGISTERED NURSE

## 2025-02-09 PROCEDURE — 99284 EMERGENCY DEPT VISIT MOD MDM: CPT

## 2025-02-09 RX ORDER — KETOROLAC TROMETHAMINE 30 MG/ML
30 INJECTION, SOLUTION INTRAMUSCULAR; INTRAVENOUS ONCE
Status: COMPLETED | OUTPATIENT
Start: 2025-02-09 | End: 2025-02-09

## 2025-02-09 RX ADMIN — KETOROLAC TROMETHAMINE 30 MG: 30 INJECTION, SOLUTION INTRAMUSCULAR at 11:38

## 2025-02-09 ASSESSMENT — PAIN - FUNCTIONAL ASSESSMENT: PAIN_FUNCTIONAL_ASSESSMENT: 0-10

## 2025-02-09 ASSESSMENT — PAIN DESCRIPTION - LOCATION: LOCATION: SHOULDER

## 2025-02-09 ASSESSMENT — PAIN SCALES - GENERAL
PAINLEVEL_OUTOF10: 0 - NO PAIN
PAINLEVEL_OUTOF10: 7

## 2025-02-09 ASSESSMENT — COLUMBIA-SUICIDE SEVERITY RATING SCALE - C-SSRS
6. HAVE YOU EVER DONE ANYTHING, STARTED TO DO ANYTHING, OR PREPARED TO DO ANYTHING TO END YOUR LIFE?: NO
2. HAVE YOU ACTUALLY HAD ANY THOUGHTS OF KILLING YOURSELF?: NO
1. IN THE PAST MONTH, HAVE YOU WISHED YOU WERE DEAD OR WISHED YOU COULD GO TO SLEEP AND NOT WAKE UP?: NO

## 2025-02-09 NOTE — ED PROVIDER NOTES
HPI   Chief Complaint   Patient presents with   • Shoulder Pain     R shoulder     30-year-old male with past medical history significant for right bicep labral repair and bicep tenodesis surgery in August 2024 presents emergency department today for evaluation of right shoulder pain.  Patient tells me he took a fall last week at work.  Patient tells me he was seen in the orthopedics office last week and did have an injection into his shoulder he tells me initially he is feeling better however the pain came back worse on Thursday.  Patient tells me he is been taking over-the-counter medicines with minimal relief.  Patient denies reaching back out to orthopedics for worsening pain.  Patient tells me the pain is making it difficult for him to sleep.  Patient tells me with movement of his right shoulder he does hear a clicking sound.  Patient denies fever or chills.  Patient denies any new injury or trauma.  Patient has no other complaints on arrival.    HPI        Patient History   Past Medical History:   Diagnosis Date   • Back pain    • Constipation    • Eczema    • GERD (gastroesophageal reflux disease)    • Hypertension    • Murmur    • Personal history of other diseases of the circulatory system     History of essential hypertension   • Personal history of other diseases of the musculoskeletal system and connective tissue     History of arthritis   • Personal history of other diseases of the respiratory system     History of asthma   • Snores      Past Surgical History:   Procedure Laterality Date   • ESOPHAGOGASTRODUODENOSCOPY     • REFRACTIVE SURGERY     • SPINE SURGERY     • URETHRA SURGERY      Urethral dilation   • XR CHEST PACEMAKER WITH FLUORO  06/03/2019    XR CHEST PACEMAKER WITH FLUORO 6/3/2019 [facility]     Family History   Problem Relation Name Age of Onset   • No Known Problems Mother     • No Known Problems Father       Social History     Tobacco Use   • Smoking status: Some Days      Types: Cigars   • Smokeless tobacco: Never   Vaping Use   • Vaping status: Never Used   Substance Use Topics   • Alcohol use: Yes     Comment: socially   • Drug use: Never       Physical Exam   ED Triage Vitals [02/09/25 1105]   Temperature Heart Rate Respirations BP   36.4 °C (97.5 °F) 90 18 176/77      Pulse Ox Temp Source Heart Rate Source Patient Position   98 % Temporal -- Sitting      BP Location FiO2 (%)     Right arm --       Physical Exam  Vitals and nursing note reviewed.   HENT:      Head: Normocephalic and atraumatic.   Cardiovascular:      Rate and Rhythm: Normal rate.      Pulses: Normal pulses.   Abdominal:      Palpations: Abdomen is soft.   Skin:     General: Skin is warm and dry.      Capillary Refill: Capillary refill takes less than 2 seconds.   Neurological:      General: No focal deficit present.      Mental Status: He is alert and oriented to person, place, and time.   Psychiatric:         Mood and Affect: Mood normal.         Behavior: Behavior normal.           ED Course & MDM   Diagnoses as of 02/09/25 1137   Right shoulder pain, unspecified chronicity                 No data recorded                                 Medical Decision Making    Patient seen exam emergency department; patient is healthy nontoxic appearance and does not appear to be in acute distress.  Patient's respirations are even unlabored, skin is warm and dry no diaphoresis noted.  Patient is neurologically intact any focal deficits.  Patient to motion in right shoulder however he does report pain.  Patient has equal strength in upper extremities.  Patient's MSPs are intact distally.  I did discuss with patient that ultimately he needs to return to orthopedics to discuss further planning.  Patient may need to have a CT of his shoulder.  Patient given a shot of Toradol while in the emergency department.  I did encourage him to utilize the walk-in clinic tomorrow for reevaluation.  All patient's questions and concerns were  addressed prior to discharge.  Patient discharged home in stable condition.    Procedure  Procedures     Elizabeth Delgado, DORCAS-CNP  02/09/25 1135

## 2025-02-10 ENCOUNTER — OFFICE VISIT (OUTPATIENT)
Dept: ORTHOPEDIC SURGERY | Facility: CLINIC | Age: 30
End: 2025-02-10
Payer: COMMERCIAL

## 2025-02-10 DIAGNOSIS — M24.111 LABRAL TEAR OF SHOULDER, DEGENERATIVE, RIGHT: ICD-10-CM

## 2025-02-10 DIAGNOSIS — S43.402A SPRAIN OF LEFT SHOULDER, INITIAL ENCOUNTER: Primary | ICD-10-CM

## 2025-02-10 PROCEDURE — 20610 DRAIN/INJ JOINT/BURSA W/O US: CPT | Mod: LT | Performed by: ORTHOPAEDIC SURGERY

## 2025-02-10 PROCEDURE — 2500000004 HC RX 250 GENERAL PHARMACY W/ HCPCS (ALT 636 FOR OP/ED): Performed by: ORTHOPAEDIC SURGERY

## 2025-02-10 PROCEDURE — 99214 OFFICE O/P EST MOD 30 MIN: CPT | Mod: 25 | Performed by: ORTHOPAEDIC SURGERY

## 2025-02-10 PROCEDURE — 99214 OFFICE O/P EST MOD 30 MIN: CPT | Performed by: ORTHOPAEDIC SURGERY

## 2025-02-10 RX ORDER — LIDOCAINE HYDROCHLORIDE 10 MG/ML
5 INJECTION, SOLUTION INFILTRATION; PERINEURAL
Status: COMPLETED | OUTPATIENT
Start: 2025-02-10 | End: 2025-02-10

## 2025-02-10 RX ORDER — BETAMETHASONE SODIUM PHOSPHATE AND BETAMETHASONE ACETATE 3; 3 MG/ML; MG/ML
2 INJECTION, SUSPENSION INTRA-ARTICULAR; INTRALESIONAL; INTRAMUSCULAR; SOFT TISSUE
Status: COMPLETED | OUTPATIENT
Start: 2025-02-10 | End: 2025-02-10

## 2025-02-10 RX ADMIN — BETAMETHASONE SODIUM PHOSPHATE AND BETAMETHASONE ACETATE 2 ML: 3; 3 INJECTION, SUSPENSION INTRA-ARTICULAR; INTRALESIONAL; INTRAMUSCULAR at 16:32

## 2025-02-10 RX ADMIN — LIDOCAINE HYDROCHLORIDE 5 ML: 10 INJECTION, SOLUTION INFILTRATION; PERINEURAL at 16:32

## 2025-02-10 NOTE — PROGRESS NOTES
History of present illness: Patient here with a history of right shoulder arthroscopic labral repair bicep tenodesis    He had a trauma landed on his shoulder had a large hematoma bruise of the forearm jammed the shoulder and had severe pain    He tried a cortisone shot exercise motion program but continues to have severe shoulder pain right side    Recently he was running a snowblower and hurt his left shoulder had x-rays which were negative    Physical exam:    General: No acute distress or breathing difficulty or discomfort, pleasant and cooperative with the examination.    Extremities: Left shoulder is examined today    The left shoulder was inspected and was found to have no obvious deformity.  There was tenderness to touch over the lateral edges of the shoulder over the rotator cuff insertion.  Active forward flexion 120 degrees, external rotation to 60 degrees, abduction to 45 degrees, and internal rotation to the level of L2.    At this time the shoulder is neurovascular tact and neurosensory intact.  Motor intact C5-T1.  There was no obvious neck pain or radiculopathy noted.  There was no gross instability or adhesive capsulitis symptoms.  There was no evidence of apprehension or apprehension suppression.    Strength was tested in 4 planes with weakness in the supraspinatus strength testing and external rotation position.  There was no strength deficit in internal rotation.  Impingement signs were positive both supine and standing for impingement test type I and II.  There was mild pain over the bicipital groove with a positive speeds sign    Before aspiration injection the risks of a cortisone injection including infection, local skin irritation, skin atrophy, calcification, continued pain and discomfort, elevated blood sugar, burning, failure to relieve pain, possible late infection were discussed with the patient.    Postprocedure discomfort can be alleviated with additional medications, ice, elevation,  rest over the first 24 hours as recommended.    Patient verbalized understanding and wanted to proceed with the planned procedure.    After informed consent was provided and allergies verified, the patient was positioned appropriately on thel bed.  The left shoulder to be aspirated and injected was prepped and draped in a sterile fashion.  The skin was anesthetized with ethyl chloride spray.  A joint aspiration was to be performed an 18-gauge needle was used otherwise a 22-gauge needle was used to inject the appropriate joint.    Joint injection was performed with a mixture of 5 cc 1% lidocaine plain and 2 cc Celestone Soluspan 6 mg per mL.  The needle was removed and the puncture site closed and sealed with a Band-Aid.  The patient tolerated the procedure well.    Right shoulder exam    Right shoulder continues to be painful shoulder exam right    The right shoulder was inspected and was found to have no obvious deformity.  There was tenderness to touch over the lateral edges of the shoulder over the rotator cuff insertion.  Active forward flexion 110 degrees, external rotation to 50 degrees, abduction to 45 degrees, and internal rotation to the level of L2.    At this time the shoulder is neurovascular tact and neurosensory intact.  Motor intact C5-T1.  There was no obvious neck pain or radiculopathy noted.  There was no gross instability or adhesive capsulitis symptoms.  There was no evidence of apprehension or apprehension suppression.    Strength was tested in 4 planes with weakness in the supraspinatus strength testing and external rotation position.  There was no strength deficit in internal rotation.  Impingement signs were positive both supine and standing for impingement test type I and II.  There was mild pain over the bicipital groove with a positive speeds sign    Diagnostic studies: X-rays are unremarkable both shoulders, the right shoulder is had a bicep tenodesis with a impact tact bicep tenodesis  button    Impression: Right shoulder possible recurrent labral tear with instability    Left shoulder recent trauma and impingement    Plan: Injection left shoulder ice elevate range of motion program    Right shoulder arthrogram MRI is going to be repeated as he is failed injections and is as severe pain following a trauma to his shoulder with prior surgery concern is for labral retear or instability  Patient ID: Hair Lew is a 30 y.o. male.    L Inj/Asp: L subacromial bursa on 2/10/2025 4:32 PM  Indications: pain  Details: 22 G needle, medial approach  Medications: 2 mL betamethasone acet,sod phos 6 mg/mL; 5 mL lidocaine 10 mg/mL (1 %)  Outcome: tolerated well, no immediate complications  Procedure, treatment alternatives, risks and benefits explained, specific risks discussed. Consent was given by the patient. Immediately prior to procedure a time out was called to verify the correct patient, procedure, equipment, support staff and site/side marked as required.

## 2025-02-12 ENCOUNTER — TELEPHONE (OUTPATIENT)
Dept: ORTHOPEDIC SURGERY | Facility: CLINIC | Age: 30
End: 2025-02-12
Payer: COMMERCIAL

## 2025-02-12 NOTE — TELEPHONE ENCOUNTER
Patient's girlfriend lvm, pt is asking if anything can be sent to help with the pain. His MRI is scheduled for 2/24/25.  Pharmacy is [facility] on [facility].

## 2025-02-12 NOTE — TELEPHONE ENCOUNTER
I called her back, no pain meds until surgery. He is waiting to get MRI  Flexeril did not help either

## 2025-02-19 DIAGNOSIS — K21.9 GASTROESOPHAGEAL REFLUX DISEASE WITHOUT ESOPHAGITIS: ICD-10-CM

## 2025-02-20 RX ORDER — OMEPRAZOLE 40 MG/1
CAPSULE, DELAYED RELEASE ORAL DAILY
Qty: 90 CAPSULE | Refills: 2 | Status: SHIPPED | OUTPATIENT
Start: 2025-02-20

## 2025-02-24 ENCOUNTER — HOSPITAL ENCOUNTER (OUTPATIENT)
Dept: RADIOLOGY | Facility: HOSPITAL | Age: 30
Discharge: HOME | End: 2025-02-24
Payer: COMMERCIAL

## 2025-02-24 VITALS
RESPIRATION RATE: 16 BRPM | SYSTOLIC BLOOD PRESSURE: 133 MMHG | OXYGEN SATURATION: 97 % | HEART RATE: 91 BPM | DIASTOLIC BLOOD PRESSURE: 78 MMHG

## 2025-02-24 DIAGNOSIS — M24.111 LABRAL TEAR OF SHOULDER, DEGENERATIVE, RIGHT: ICD-10-CM

## 2025-02-24 PROCEDURE — 2500000001 HC RX 250 WO HCPCS SELF ADMINISTERED DRUGS (ALT 637 FOR MEDICARE OP): Performed by: RADIOLOGY

## 2025-02-24 PROCEDURE — 73222 MRI JOINT UPR EXTREM W/DYE: CPT | Mod: RIGHT SIDE | Performed by: RADIOLOGY

## 2025-02-24 PROCEDURE — 2550000001 HC RX 255 CONTRASTS: Performed by: ORTHOPAEDIC SURGERY

## 2025-02-24 PROCEDURE — 23350 INJECTION FOR SHOULDER X-RAY: CPT | Mod: RT

## 2025-02-24 PROCEDURE — 2500000004 HC RX 250 GENERAL PHARMACY W/ HCPCS (ALT 636 FOR OP/ED): Performed by: RADIOLOGY

## 2025-02-24 PROCEDURE — 2500000005 HC RX 250 GENERAL PHARMACY W/O HCPCS: Performed by: RADIOLOGY

## 2025-02-24 PROCEDURE — A9575 INJ GADOTERATE MEGLUMI 0.1ML: HCPCS | Performed by: ORTHOPAEDIC SURGERY

## 2025-02-24 RX ORDER — GADOTERATE MEGLUMINE 376.9 MG/ML
0.1 INJECTION INTRAVENOUS
Status: COMPLETED | OUTPATIENT
Start: 2025-02-24 | End: 2025-02-24

## 2025-02-24 RX ORDER — DIPHENHYDRAMINE HCL 25 MG
50 TABLET ORAL ONCE
Status: COMPLETED | OUTPATIENT
Start: 2025-02-24 | End: 2025-02-24

## 2025-02-24 RX ORDER — SODIUM CHLORIDE 9 MG/ML
30 INJECTION INTRAMUSCULAR; INTRAVENOUS; SUBCUTANEOUS
Status: COMPLETED | OUTPATIENT
Start: 2025-02-24 | End: 2025-02-24

## 2025-02-24 RX ORDER — LIDOCAINE HYDROCHLORIDE 20 MG/ML
20 INJECTION, SOLUTION INFILTRATION; PERINEURAL ONCE
Status: COMPLETED | OUTPATIENT
Start: 2025-02-24 | End: 2025-02-24

## 2025-02-24 RX ADMIN — LIDOCAINE HYDROCHLORIDE 9 ML: 20 INJECTION, SOLUTION INFILTRATION; PERINEURAL at 13:35

## 2025-02-24 RX ADMIN — IOHEXOL 2 ML: 300 INJECTION, SOLUTION INTRAVENOUS at 13:34

## 2025-02-24 RX ADMIN — DIPHENHYDRAMINE HYDROCHLORIDE 50 MG: 25 TABLET ORAL at 14:32

## 2025-02-24 RX ADMIN — SODIUM CHLORIDE 10 ML: 9 INJECTION, SOLUTION INTRAMUSCULAR; INTRAVENOUS; SUBCUTANEOUS at 13:36

## 2025-02-24 RX ADMIN — GADOTERATE MEGLUMINE 0.1 ML: 376.9 INJECTION INTRAVENOUS at 13:35

## 2025-02-24 NOTE — NURSING NOTE
Pt observed x 1 hour.  Pt states that the burning sensation on the neck has subsided.  The redness on neck and shoulder has improved.  Dr. Miller informed.

## 2025-02-24 NOTE — NURSING NOTE
Pt s/p right shoulder arthrogram.  Pt with redness to right shoulder after injection.  Redness is not raised but does ismael.  Pt had MRI.  Following the MRI the patient complaining of burning to neck and bilateral shoulders.  The reddened area is warm to touch.  Dr. Miller informed and order received to medicate patient with benedryl 50mg and observe patient for 1 hour.  Pt brought to [place] area and is sitting in recliner chair.  No new complaints.  Pt has a family member to pick him up when ready to go home.

## 2025-03-03 ENCOUNTER — APPOINTMENT (OUTPATIENT)
Dept: ORTHOPEDIC SURGERY | Facility: CLINIC | Age: 30
End: 2025-03-03
Payer: COMMERCIAL

## 2025-03-03 DIAGNOSIS — M24.111 LABRAL TEAR OF SHOULDER, DEGENERATIVE, RIGHT: Primary | ICD-10-CM

## 2025-03-03 PROCEDURE — 99213 OFFICE O/P EST LOW 20 MIN: CPT | Performed by: ORTHOPAEDIC SURGERY

## 2025-03-03 NOTE — PROGRESS NOTES
History of present illness: Patient with a history of traumatic injury right shoulder he fell out of bed landing on the shoulder ecchymosis bruising swelling resolved he had a prior history of a bicep tenodesis    He has had considerable pain and follows up    Physical exam:    General: No acute distress or breathing difficulty or discomfort, pleasant and cooperative with the examination.    Extremities: The right shoulder was inspected and was found to have no obvious deformity.  There was tenderness to touch over the lateral edges of the shoulder over the rotator cuff insertion.  Active forward flexion 110 degrees, external rotation to 50 degrees, abduction to 45 degrees, and internal rotation to the level of L2.    At this time the shoulder is neurovascular tact and neurosensory intact.  Motor intact C5-T1.  There was no obvious neck pain or radiculopathy noted.  There was no gross instability or adhesive capsulitis symptoms.  There was no evidence of apprehension or apprehension suppression.    Strength was tested in 4 planes with weakness in the supraspinatus strength testing and external rotation position.  There was no strength deficit in internal rotation.  Impingement signs were positive both supine and standing for impingement test type I and II.  There was mild pain over the bicipital groove with a positive speeds sign    Diagnostic studies: MRI imaging study shows labral tear previous bicep tenodesis is stable and high-grade partial rotator cuff tear right shoulder    Impression: Highly symptomatic shoulder with labral and high-grade partial cuff tear right shoulder    Plan: Failing conservative treatment now for arthroscopy labral debridement versus repair with probable rotator cuff repair rehab program can take 3 months we will see him on the operative schedule in the near future    Alternatives to surgery discussed    Risk and benefits of surgery discussed extensively with the patient.    Surgical  risk included but were not limited to infection, wear, loosening, need for further surgery blood clot, failure to heal, failure of the surgery, stiffness, loss of limb life, extremity function change in length change, and associated risks of  any surgery.

## 2025-03-14 ENCOUNTER — HOSPITAL ENCOUNTER (EMERGENCY)
Facility: HOSPITAL | Age: 30
Discharge: HOME | End: 2025-03-14
Payer: COMMERCIAL

## 2025-03-14 ENCOUNTER — APPOINTMENT (OUTPATIENT)
Dept: RADIOLOGY | Facility: HOSPITAL | Age: 30
End: 2025-03-14
Payer: COMMERCIAL

## 2025-03-14 VITALS
RESPIRATION RATE: 18 BRPM | DIASTOLIC BLOOD PRESSURE: 87 MMHG | HEIGHT: 69 IN | TEMPERATURE: 96.4 F | HEART RATE: 78 BPM | WEIGHT: 230 LBS | OXYGEN SATURATION: 100 % | SYSTOLIC BLOOD PRESSURE: 148 MMHG | BODY MASS INDEX: 34.07 KG/M2

## 2025-03-14 DIAGNOSIS — S46.911A RIGHT SHOULDER STRAIN, INITIAL ENCOUNTER: Primary | ICD-10-CM

## 2025-03-14 PROCEDURE — 73030 X-RAY EXAM OF SHOULDER: CPT | Mod: RT

## 2025-03-14 PROCEDURE — 2500000001 HC RX 250 WO HCPCS SELF ADMINISTERED DRUGS (ALT 637 FOR MEDICARE OP): Performed by: PHYSICIAN ASSISTANT

## 2025-03-14 PROCEDURE — 73030 X-RAY EXAM OF SHOULDER: CPT | Mod: RIGHT SIDE | Performed by: SURGERY

## 2025-03-14 PROCEDURE — 99283 EMERGENCY DEPT VISIT LOW MDM: CPT

## 2025-03-14 RX ORDER — CYCLOBENZAPRINE HCL 5 MG
5 TABLET ORAL 3 TIMES DAILY
Qty: 30 TABLET | Refills: 0 | Status: SHIPPED | OUTPATIENT
Start: 2025-03-14 | End: 2025-03-24

## 2025-03-14 RX ORDER — OXYCODONE HYDROCHLORIDE 5 MG/1
5 TABLET ORAL ONCE
Status: COMPLETED | OUTPATIENT
Start: 2025-03-14 | End: 2025-03-14

## 2025-03-14 RX ORDER — NAPROXEN 500 MG/1
500 TABLET ORAL
Qty: 30 TABLET | Refills: 0 | Status: SHIPPED | OUTPATIENT
Start: 2025-03-14 | End: 2025-03-29

## 2025-03-14 RX ADMIN — OXYCODONE 5 MG: 5 TABLET ORAL at 02:59

## 2025-03-14 ASSESSMENT — PAIN SCALES - GENERAL
PAINLEVEL_OUTOF10: 5 - MODERATE PAIN
PAINLEVEL_OUTOF10: 10 - WORST POSSIBLE PAIN

## 2025-03-14 ASSESSMENT — LIFESTYLE VARIABLES
EVER HAD A DRINK FIRST THING IN THE MORNING TO STEADY YOUR NERVES TO GET RID OF A HANGOVER: NO
HAVE PEOPLE ANNOYED YOU BY CRITICIZING YOUR DRINKING: NO
EVER FELT BAD OR GUILTY ABOUT YOUR DRINKING: NO
TOTAL SCORE: 0
HAVE YOU EVER FELT YOU SHOULD CUT DOWN ON YOUR DRINKING: NO

## 2025-03-14 ASSESSMENT — PAIN - FUNCTIONAL ASSESSMENT
PAIN_FUNCTIONAL_ASSESSMENT: 0-10
PAIN_FUNCTIONAL_ASSESSMENT: 0-10

## 2025-03-14 ASSESSMENT — COLUMBIA-SUICIDE SEVERITY RATING SCALE - C-SSRS
2. HAVE YOU ACTUALLY HAD ANY THOUGHTS OF KILLING YOURSELF?: NO
1. IN THE PAST MONTH, HAVE YOU WISHED YOU WERE DEAD OR WISHED YOU COULD GO TO SLEEP AND NOT WAKE UP?: NO
6. HAVE YOU EVER DONE ANYTHING, STARTED TO DO ANYTHING, OR PREPARED TO DO ANYTHING TO END YOUR LIFE?: NO

## 2025-03-14 ASSESSMENT — PAIN DESCRIPTION - ORIENTATION: ORIENTATION: RIGHT

## 2025-03-14 ASSESSMENT — PAIN DESCRIPTION - LOCATION: LOCATION: SHOULDER

## 2025-03-14 NOTE — Clinical Note
Hair Lew was seen and treated in our emergency department on 3/14/2025.  He may return to work on 03/17/2025.       If you have any questions or concerns, please don't hesitate to call.      Derrick Aceves PA-C

## 2025-03-14 NOTE — ED PROVIDER NOTES
HPI   Chief Complaint   Patient presents with   • Shoulder Pain     Pt due for rotator cuff and labrum surgery in right shoulder in 2 weeks. 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 Hand/elbow intact. Pt a/ox4 from home       30-year-old male presents emergency room with chief complaint of worsening right shoulder pain.  The patient has a known history of rotator cuff and labrum tear and is scheduled for surgery with Dr. Derrick Todd.  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  He denies numbness or tingling distally.  Rates his shoulder pain a 10 out of 10.  Is worse with movement.  The patient has not taken anything for pain prior to arrival from home.  The patient is the primary historian.      History provided by:  Patient and medical records          Patient History   Past Medical History:   Diagnosis Date   • Back pain    • Constipation    • Eczema    • GERD (gastroesophageal reflux disease)    • Hypertension    • Murmur    • Personal history of other diseases of the circulatory system     History of essential hypertension   • Personal history of other diseases of the musculoskeletal system and connective tissue     History of arthritis   • Personal history of other diseases of the respiratory system     History of asthma   • Snores      Past Surgical History:   Procedure Laterality Date   • ESOPHAGOGASTRODUODENOSCOPY     • REFRACTIVE SURGERY     • SPINE SURGERY     • URETHRA SURGERY      Urethral dilation   • XR CHEST PACEMAKER WITH FLUORO  06/03/2019    XR CHEST PACEMAKER WITH FLUORO 6/3/2019 [facility]     Family History   Problem Relation Name Age of Onset   • No Known Problems Mother     • No Known Problems Father       Social History     Tobacco Use   • Smoking status: Some Days     Types: Cigars   • Smokeless tobacco: Never   Vaping Use   • Vaping status: Never  Used   Substance Use Topics   • Alcohol use: Yes     Comment: socially   • Drug use: Never       Physical Exam   ED Triage Vitals [03/14/25 0159]   Temperature Heart Rate Respirations BP   35.8 °C (96.4 °F) 88 18 (!) 170/97      Pulse Ox Temp Source Heart Rate Source Patient Position   100 % Temporal Monitor --      BP Location FiO2 (%)     -- --       Physical Exam  Vitals and nursing note reviewed.   Constitutional:       Appearance: Normal appearance. He is normal weight. He is not ill-appearing.   HENT:      Head: Normocephalic and atraumatic.      Right Ear: External ear normal.      Left Ear: External ear normal.      Nose: Nose normal.      Mouth/Throat:      Mouth: Mucous membranes are moist.      Pharynx: Oropharynx is clear.   Eyes:      Conjunctiva/sclera: Conjunctivae normal.      Pupils: Pupils are equal, round, and reactive to light.   Cardiovascular:      Rate and Rhythm: Normal rate and regular rhythm.      Pulses: Normal pulses.      Heart sounds: Normal heart sounds.   Pulmonary:      Effort: Pulmonary effort is normal.      Breath sounds: Normal breath sounds.   Chest:      Chest wall: No tenderness.   Musculoskeletal:      Right shoulder: Tenderness present. No bony tenderness. Decreased range of motion.      Left shoulder: Normal.        Arms:       Cervical back: Normal range of motion and neck supple. No rigidity.      Comments: Shoulder shows no range of motion, there is no deformity no bony step-off, neurovasc intact distally.   Lymphadenopathy:      Cervical: No cervical adenopathy.   Skin:     General: Skin is warm and dry.      Capillary Refill: Capillary refill takes less than 2 seconds.   Neurological:      General: No focal deficit present.      Mental Status: He is alert and oriented to person, place, and time. Mental status is at baseline.   Psychiatric:         Mood and Affect: Mood normal.         Behavior: Behavior normal.         Thought Content: Thought content normal.          Judgment: Judgment normal.           ED Course & MDM   Diagnoses as of 03/14/25 0343   Right shoulder strain, initial encounter                 No data recorded     Bim Coma Scale Score: 15 (03/14/25 0202 : Marta Prince RN)                           Medical Decision Making  Temp 35 8 heart rate 88 respirations 18 /97 pulse ox is 100% on room air  The patient was medicated with oxycodone 5 mg p.o. an x-ray of the right shoulder has been ordered.  X-ray of the shoulder shows no evidence of acute fracture or malalignment, no osteolysis or aggressive periostitis, suture anchor again noted in the lateral aspect of the proximal humerus, soft tissues appear within normal limits.  I discussed results of the x-ray with the patient.  I am more suspicious of a shoulder strain, there is no evidence to suggest fracture or dislocation.  The patient does have a sling at home advised him to wear the sling but take it off every 1-2 hours and do active and passive range of motion exercises.  The patient was discharged home with prescription for naproxen, Flexeril and advised him to follow-up closely with Dr. Todd.  He was given a work excuse.  He was advised to return with any concerns or worsening of symptoms all questions answered prior to discharge        Procedure  Procedures     Derrick Aceves PA-C  03/14/25 5171

## 2025-03-17 ENCOUNTER — LAB (OUTPATIENT)
Dept: LAB | Facility: HOSPITAL | Age: 30
End: 2025-03-17
Payer: COMMERCIAL

## 2025-03-17 LAB
ALBUMIN SERPL BCP-MCNC: 4.4 G/DL (ref 3.4–5)
ALP SERPL-CCNC: 68 U/L (ref 33–120)
ALT SERPL W P-5'-P-CCNC: 16 U/L (ref 10–52)
ANION GAP SERPL CALC-SCNC: 11 MMOL/L (ref 10–20)
AST SERPL W P-5'-P-CCNC: 16 U/L (ref 9–39)
BASOPHILS # BLD AUTO: 0.03 X10*3/UL (ref 0–0.1)
BASOPHILS NFR BLD AUTO: 0.6 %
BILIRUB SERPL-MCNC: 0.3 MG/DL (ref 0–1.2)
BUN SERPL-MCNC: 9 MG/DL (ref 6–23)
CALCIUM SERPL-MCNC: 9 MG/DL (ref 8.6–10.3)
CHLORIDE SERPL-SCNC: 106 MMOL/L (ref 98–107)
CO2 SERPL-SCNC: 28 MMOL/L (ref 21–32)
CREAT SERPL-MCNC: 0.8 MG/DL (ref 0.5–1.3)
EGFRCR SERPLBLD CKD-EPI 2021: >90 ML/MIN/1.73M*2
EOSINOPHIL # BLD AUTO: 0.07 X10*3/UL (ref 0–0.7)
EOSINOPHIL NFR BLD AUTO: 1.3 %
ERYTHROCYTE [DISTWIDTH] IN BLOOD BY AUTOMATED COUNT: 12.5 % (ref 11.5–14.5)
GLUCOSE SERPL-MCNC: 91 MG/DL (ref 74–99)
HCT VFR BLD AUTO: 42.7 % (ref 41–52)
HGB BLD-MCNC: 14.4 G/DL (ref 13.5–17.5)
IMM GRANULOCYTES # BLD AUTO: 0.01 X10*3/UL (ref 0–0.7)
IMM GRANULOCYTES NFR BLD AUTO: 0.2 % (ref 0–0.9)
INR PPP: 1 (ref 0.9–1.1)
LYMPHOCYTES # BLD AUTO: 1.7 X10*3/UL (ref 1.2–4.8)
LYMPHOCYTES NFR BLD AUTO: 31.3 %
MCH RBC QN AUTO: 29.6 PG (ref 26–34)
MCHC RBC AUTO-ENTMCNC: 33.7 G/DL (ref 32–36)
MCV RBC AUTO: 88 FL (ref 80–100)
MONOCYTES # BLD AUTO: 0.48 X10*3/UL (ref 0.1–1)
MONOCYTES NFR BLD AUTO: 8.8 %
NEUTROPHILS # BLD AUTO: 3.15 X10*3/UL (ref 1.2–7.7)
NEUTROPHILS NFR BLD AUTO: 57.8 %
NRBC BLD-RTO: 0 /100 WBCS (ref 0–0)
PLATELET # BLD AUTO: 243 X10*3/UL (ref 150–450)
POTASSIUM SERPL-SCNC: 4.5 MMOL/L (ref 3.5–5.3)
PROT SERPL-MCNC: 6.3 G/DL (ref 6.4–8.2)
PROTHROMBIN TIME: 11.3 SECONDS (ref 9.8–12.4)
RBC # BLD AUTO: 4.86 X10*6/UL (ref 4.5–5.9)
SODIUM SERPL-SCNC: 140 MMOL/L (ref 136–145)
WBC # BLD AUTO: 5.4 X10*3/UL (ref 4.4–11.3)

## 2025-03-17 PROCEDURE — 85610 PROTHROMBIN TIME: CPT

## 2025-03-17 PROCEDURE — 80053 COMPREHEN METABOLIC PANEL: CPT

## 2025-03-17 PROCEDURE — 85025 COMPLETE CBC W/AUTO DIFF WBC: CPT

## 2025-03-27 ENCOUNTER — OFFICE VISIT (OUTPATIENT)
Dept: ORTHOPEDIC SURGERY | Facility: CLINIC | Age: 30
End: 2025-03-27
Payer: COMMERCIAL

## 2025-03-27 DIAGNOSIS — M25.511 ACUTE PAIN OF RIGHT SHOULDER: Primary | ICD-10-CM

## 2025-03-27 DIAGNOSIS — M24.111 LABRAL TEAR OF SHOULDER, DEGENERATIVE, RIGHT: ICD-10-CM

## 2025-03-27 PROCEDURE — 99211 OFF/OP EST MAY X REQ PHY/QHP: CPT | Performed by: PHYSICIAN ASSISTANT

## 2025-03-27 RX ORDER — OXYCODONE AND ACETAMINOPHEN 5; 325 MG/1; MG/1
1 TABLET ORAL EVERY 6 HOURS PRN
Qty: 28 TABLET | Refills: 0 | Status: SHIPPED | OUTPATIENT
Start: 2025-03-27 | End: 2025-04-03

## 2025-03-27 RX ORDER — CYCLOBENZAPRINE HCL 10 MG
10 TABLET ORAL 3 TIMES DAILY PRN
Qty: 30 TABLET | Refills: 0 | Status: SHIPPED | OUTPATIENT
Start: 2025-03-27 | End: 2025-04-06

## 2025-03-27 NOTE — PROGRESS NOTES
History and Physical      CHIEF COMPLAINT: Right shoulder pain    HISTORY OF PRESENT ILLNESS:      The patient is a30 y.o. male with significant past medical history of right shoulder pain after a fall.  Diagnostic studies show labral tear and rotator cuff tear.  Conservative therapy is failing to provide relief.  After risk benefits alternatives were discussed patient likes to proceed with right shoulder arthroscopy.      Past Medical History:  Past Medical History:   Diagnosis Date    Back pain     Constipation     Eczema     GERD (gastroesophageal reflux disease)     Hypertension     Murmur     Personal history of other diseases of the circulatory system     History of essential hypertension    Personal history of other diseases of the musculoskeletal system and connective tissue     History of arthritis    Personal history of other diseases of the respiratory system     History of asthma    Snores         Past Surgical History:    Past Surgical History:   Procedure Laterality Date    ESOPHAGOGASTRODUODENOSCOPY      REFRACTIVE SURGERY      SPINE SURGERY      URETHRA SURGERY      Urethral dilation    XR CHEST PACEMAKER WITH FLUORO  06/03/2019    XR CHEST PACEMAKER WITH FLUORO 6/3/2019 [facility]       Medications Prior to Admission:    Current Outpatient Medications on File Prior to Visit   Medication Sig Dispense Refill    acetaminophen (Tylenol) 500 mg capsule Take 1 capsule (500 mg) by mouth every 6 hours if needed for mild pain (1 - 3).      amitriptyline (Elavil) 10 mg tablet Take 3 tablets (30 mg) by mouth once daily at bedtime.      cyclobenzaprine (Flexeril) 5 mg tablet Take 1 tablet (5 mg) by mouth 3 times a day for 10 days. 30 tablet 0    docusate sodium (Stool Softener) 100 mg tablet Take 1 tablet (100 mg) by mouth once daily.      ibuprofen 200 mg tablet Take 1 tablet (200 mg) by mouth every 6 hours.      lidocaine (Lidoderm) 5 % patch PLACE 1 PATCH FOR 12 HRS ON THE SKIN ONCE DAILY.  REMOVE & DISCARD PATCH WITHIN 12 HRS OR AS DIRECTED 90 patch 1    lisinopril 10 mg tablet Take by mouth.      methylPREDNISolone (Medrol Dospak) 4 mg tablets Follow schedule on package instructions 1 each 0    naproxen (Naprosyn) 500 mg tablet Take 1 tablet (500 mg) by mouth 2 times daily (morning and late afternoon) for 15 days. 30 tablet 0    omeprazole (PriLOSEC) 40 mg DR capsule Take by mouth.      predniSONE (Deltasone) 10 mg tablet 40MG FOR 2 DAYS, 30MG FOR 2 DAYS, 20MG FOR 2 DAYS, 10MG FOR 2 DAYS (Patient not taking: Reported on 5/13/2024) 20 tablet 0     No current facility-administered medications on file prior to visit.        Allergies:  Patient has no known allergies.    Social History:   Social History     Socioeconomic History    Marital status: Single     Spouse name: Not on file    Number of children: Not on file    Years of education: Not on file    Highest education level: Not on file   Occupational History    Not on file   Tobacco Use    Smoking status: Some Days     Types: Cigars    Smokeless tobacco: Never   Vaping Use    Vaping status: Never Used   Substance and Sexual Activity    Alcohol use: Yes     Comment: socially    Drug use: Never    Sexual activity: Defer   Other Topics Concern    Not on file   Social History Narrative    Not on file     Social Drivers of Health     Financial Resource Strain: Low Risk  (12/23/2024)    Received from [facility]    Overall Financial Resource Strain (CARDIA)     Difficulty of Paying Living Expenses: Not hard at all   Food Insecurity: No Food Insecurity (12/23/2024)    Received from [facility]    Hunger Vital Sign     Worried About Running Out of Food in the Last Year: Never true     Ran Out of Food in the Last Year: Never true   Transportation Needs: Unknown (12/23/2024)    Received from [facility]    PRAPARE - Transportation     Lack of Transportation (Medical): Not on file     Lack of  Transportation (Non-Medical): No   Physical Activity: Not on file   Stress: Not on file   Social Connections: Not on file   Intimate Partner Violence: Not on file   Housing Stability: Unknown (12/23/2024)    Received from [facility]    Housing Stability Vital Sign     Unable to Pay for Housing in the Last Year: Not on file     Number of Times Moved in the Last Year: Not on file     Homeless in the Last Year: No       Family History:   Family History   Problem Relation Name Age of Onset    No Known Problems Mother      No Known Problems Father          Review of systems: Noncontributory for orthopedics    Vitals:  There were no vitals taken for this visit.    Physical examination:  Head normocephalic atraumatic  Heart regular rate and rhythm  Lungs clear  Abdominal exam nontender nondistended  Extremity: Right shoulder patient has forward flexion to 110 degrees abduction 60 degrees external rotation 40 degrees internal Tatian posterior iliac crest    Impression : Right shoulder rotator cuff tear and labral tear      Plan:  Scheduled for right shoulder arthroscopic labral repair and rotator cuff repair    Risk and benefits of surgery discussed extensively with the patient.    Surgical risk included but were not limited to infection, wear, loosening, need for further surgery blood clot, failure to heal, failure of the surgery, stiffness, loss of limb life, extremity function change in length change, and associated risks of surgery during the coronavirus epidemic.    Risk with any surgery including arthroplasty and replacements include but are not limited to:       Wear, loosening, infection, blood clot, DVT, loss of limb, life, delayed recovery, limb length change, instability, dislocation, discomfort with new implant and failure of the procedure.

## 2025-04-01 ENCOUNTER — OUTSIDE PROCEDURE (OUTPATIENT)
Dept: ORTHOPEDIC SURGERY | Facility: CLINIC | Age: 30
End: 2025-04-01
Payer: COMMERCIAL

## 2025-04-01 ENCOUNTER — TRANSCRIBE ORDERS (OUTPATIENT)
Dept: PREADMISSION TESTING | Facility: HOSPITAL | Age: 30
End: 2025-04-01
Payer: COMMERCIAL

## 2025-04-01 DIAGNOSIS — Z01.818 PRE-OP TESTING: Primary | ICD-10-CM

## 2025-04-01 PROCEDURE — 29822 SHO ARTHRS SRG LMTD DBRDMT: CPT | Performed by: PHYSICIAN ASSISTANT

## 2025-04-01 PROCEDURE — 29822 SHO ARTHRS SRG LMTD DBRDMT: CPT | Performed by: ORTHOPAEDIC SURGERY

## 2025-04-01 PROCEDURE — 29806 SHO ARTHRS SRG CAPSULORRAPHY: CPT | Performed by: ORTHOPAEDIC SURGERY

## 2025-04-01 NOTE — PROGRESS NOTES
Preop diagnosis: Right shoulder labral tear and partial rotator cuff tear    Postop diagnosis: Right shoulder labral tear and partial rotator cuff tear    Procedure: Right shoulder arthroscopic capsulorrhaphy 54572    arthroscopic limited debridement of undersurface partial rotator cuff tear 47927    Surgeon: Derrick Todd M.D.    Assistant: Derrick Saldana physician assistant (PAC) was required and present throughout the entire case.  Given the nature of the disease process and the procedure, a skilled surgical first assistant was necessary during the entire case.  The assistant was necessary to hold retractors and manipulate extremity during the procedure.  A certified scrub tech was also present at the back table managing instruments with supplies for the surgical case      Anesthesia: General With a scalene block      Blood loss: Minimal    Fluids: Less than 2 L       Indications: Right shoulder traumatic labral tear.  Patient also had partial undersurface cuff tearing.  Patient had a history of prior bicep tenodesis but with a new trauma and fall severe pain MRI showing traumatic labral tear now for operative repair      Summation of event:    Patient was brought operating 5 at [facility].  Patient had duction of anesthesia.  Routine prep and drape.  Site was identified timeout was completed the patient was placed in well padded traction and arthroscopy portals were established posterior and anterior the aid of a switching stick    We inspected the glenohumeral joint glenohumeral joint was intact the cartilage intact there was some partial undersurface rotator cuff tearing behind the bicep it was around 10% cuff thickness that was smoothed and debrided the remaining cuff was intact.  The axillary pouch was empty the posterior cuff was intact.    There was traumatic labral tearing starting at 1:00 anteriorly and extending posteriorly all the way to 7:00 as described on the MRI it  was traumatic in nature there was still blood coming from the glenoid rim the labrum was otherwise intact but detached circumferentially at the bicipital previously tenodesis    With the camera posteriorly into working portals anteriorly we placed anchors at approximately 1:00 and at 12:00  holes were made 3.0 bio suture anchors were placed and #2 FiberWire was placed in and around the leading edge of the labrum and then arthroscopically tied away from the joint to firmly reapposed the labrum to the glenoid surface.    The camera was then placed anteriorly and we sequentially repaired the posterior labrum in a similar fashion placing  holes at approximately 7:00 and at 11:00 and then using 3.0 bio suture anchors from [employer] again and labral repair system we were able to firmly repair and place sutures away from the glenoid joint opposing the labrum up against the glenoid rim creating a stable firm buttress final arthroscopy pictures were taken after all sutures were trimmed and tied the humeral head was centered over the glenoid symmetrically.    Final photodocumentation completed the joint space was drained the portals were closed with nylon sutures a compressive dressing was applied a sling was applied the patient was placed supine and then extubated and then taken to recovery room

## 2025-04-02 ENCOUNTER — TELEPHONE (OUTPATIENT)
Dept: PRIMARY CARE CLINIC | Age: 30
End: 2025-04-02

## 2025-04-02 NOTE — TELEPHONE ENCOUNTER
Dr Rodas - Patient wanted just to make you aware that he had shoulder surgery with Dr. Jose Corona on 4/1/25. He states he doesn't need anything, only to inform you about the surgery since you are his PCP and a really good doctor.    See below attached report to review

## 2025-04-03 ENCOUNTER — OFFICE VISIT (OUTPATIENT)
Dept: ORTHOPEDIC SURGERY | Facility: CLINIC | Age: 30
End: 2025-04-03
Payer: COMMERCIAL

## 2025-04-03 DIAGNOSIS — M24.111 LABRAL TEAR OF SHOULDER, DEGENERATIVE, RIGHT: Primary | ICD-10-CM

## 2025-04-03 DIAGNOSIS — S46.911A RIGHT SHOULDER STRAIN, INITIAL ENCOUNTER: ICD-10-CM

## 2025-04-03 PROCEDURE — 99211 OFF/OP EST MAY X REQ PHY/QHP: CPT | Performed by: PHYSICIAN ASSISTANT

## 2025-04-03 RX ORDER — OXYCODONE AND ACETAMINOPHEN 5; 325 MG/1; MG/1
1 TABLET ORAL EVERY 6 HOURS PRN
Qty: 28 TABLET | Refills: 0 | Status: SHIPPED | OUTPATIENT
Start: 2025-04-07 | End: 2025-04-14

## 2025-04-03 NOTE — PROGRESS NOTES
History of present illness patient is status post right shoulder arthroscopic revision labral repair.  Patient presents in sling today.      Physical exam:      General: No acute distress or breathing difficulty or discomfort, pleasant and cooperative with the examination.    Extremities: Right shoulder incisions are clean dry and intact sutures removed new Steri-Strips were placed there is no drainage redness evidence of infection      Impression: Status post right revision arthroscopic labral repair    Plan: We discussed hand wrist and elbow range of motion.  Patient will remain in the sling for at least 6 weeks.  He will follow-up in 3 weeks for wound check and we will provide him a physical therapy prescription at that time

## 2025-04-06 DIAGNOSIS — I10 HYPERTENSION, UNSPECIFIED TYPE: ICD-10-CM

## 2025-04-07 RX ORDER — LISINOPRIL 10 MG/1
10 TABLET ORAL DAILY
Qty: 90 TABLET | Refills: 3 | Status: SHIPPED | OUTPATIENT
Start: 2025-04-07

## 2025-04-11 ENCOUNTER — TELEPHONE (OUTPATIENT)
Dept: ORTHOPEDIC SURGERY | Facility: CLINIC | Age: 30
End: 2025-04-11
Payer: COMMERCIAL

## 2025-04-11 DIAGNOSIS — M24.111 LABRAL TEAR OF SHOULDER, DEGENERATIVE, RIGHT: ICD-10-CM

## 2025-04-11 RX ORDER — OXYCODONE AND ACETAMINOPHEN 5; 325 MG/1; MG/1
1 TABLET ORAL EVERY 6 HOURS PRN
Qty: 28 TABLET | Refills: 0 | Status: SHIPPED | OUTPATIENT
Start: 2025-04-11 | End: 2025-04-18 | Stop reason: SDUPTHER

## 2025-04-11 NOTE — TELEPHONE ENCOUNTER
Patient would like a refill on Oxycodone sent to [facility] in [place].    rt capsulorrhaphy 4/1/25

## 2025-04-18 ENCOUNTER — TELEPHONE (OUTPATIENT)
Dept: ORTHOPEDIC SURGERY | Facility: CLINIC | Age: 30
End: 2025-04-18
Payer: COMMERCIAL

## 2025-04-18 DIAGNOSIS — M24.111 LABRAL TEAR OF SHOULDER, DEGENERATIVE, RIGHT: ICD-10-CM

## 2025-04-18 RX ORDER — OXYCODONE AND ACETAMINOPHEN 5; 325 MG/1; MG/1
1 TABLET ORAL EVERY 6 HOURS PRN
Qty: 28 TABLET | Refills: 0 | Status: SHIPPED | OUTPATIENT
Start: 2025-04-18 | End: 2025-04-25

## 2025-04-24 ENCOUNTER — OFFICE VISIT (OUTPATIENT)
Dept: ORTHOPEDIC SURGERY | Facility: CLINIC | Age: 30
End: 2025-04-24
Payer: COMMERCIAL

## 2025-04-24 DIAGNOSIS — M24.111 LABRAL TEAR OF SHOULDER, DEGENERATIVE, RIGHT: Primary | ICD-10-CM

## 2025-04-24 PROCEDURE — 99024 POSTOP FOLLOW-UP VISIT: CPT | Performed by: PHYSICIAN ASSISTANT

## 2025-04-24 PROCEDURE — 99212 OFFICE O/P EST SF 10 MIN: CPT | Performed by: PHYSICIAN ASSISTANT

## 2025-04-24 RX ORDER — CYCLOBENZAPRINE HCL 10 MG
10 TABLET ORAL 3 TIMES DAILY PRN
Qty: 30 TABLET | Refills: 0 | Status: CANCELLED | OUTPATIENT
Start: 2025-04-24 | End: 2025-05-04

## 2025-04-24 RX ORDER — OXYCODONE AND ACETAMINOPHEN 5; 325 MG/1; MG/1
1 TABLET ORAL EVERY 6 HOURS PRN
Qty: 28 TABLET | Refills: 0 | Status: SHIPPED | OUTPATIENT
Start: 2025-04-24 | End: 2025-05-01

## 2025-04-24 RX ORDER — CYCLOBENZAPRINE HCL 10 MG
10 TABLET ORAL 3 TIMES DAILY PRN
Qty: 30 TABLET | Refills: 0 | Status: SHIPPED | OUTPATIENT
Start: 2025-04-24 | End: 2025-05-04

## 2025-04-24 RX ORDER — OXYCODONE AND ACETAMINOPHEN 5; 325 MG/1; MG/1
1 TABLET ORAL EVERY 6 HOURS PRN
Qty: 28 TABLET | Refills: 0 | Status: CANCELLED | OUTPATIENT
Start: 2025-04-24 | End: 2025-05-01

## 2025-04-24 NOTE — PROGRESS NOTES
History of present illness patient is status post right revision right capsulorrhaphy of the labrum.  He presents in sling today.  Patient reports having a fairly significant amount of soreness throughout the shoulder and arm.      Physical exam:      General: No acute distress or breathing difficulty or discomfort, pleasant and cooperative with the examination.    Extremities: Right shoulder incisions are clean dry and intact he is neurovascularly intact      Impression: Status post right shoulder revision arthroscopic capsulorrhaphy    Plan: Patient will remain in sling until 5/14/2025.  He will then start physical therapy.  Order was provided today he would like to perform this at [facility].  He will follow-up in approximately 4 to 5 weeks for range of motion check.

## 2025-05-01 ENCOUNTER — TELEPHONE (OUTPATIENT)
Dept: ORTHOPEDIC SURGERY | Facility: CLINIC | Age: 30
End: 2025-05-01
Payer: COMMERCIAL

## 2025-05-01 DIAGNOSIS — M24.111 LABRAL TEAR OF SHOULDER, DEGENERATIVE, RIGHT: ICD-10-CM

## 2025-05-01 RX ORDER — OXYCODONE AND ACETAMINOPHEN 5; 325 MG/1; MG/1
1 TABLET ORAL EVERY 6 HOURS PRN
Qty: 28 TABLET | Refills: 0 | Status: SHIPPED | OUTPATIENT
Start: 2025-05-01 | End: 2025-05-08

## 2025-05-01 NOTE — TELEPHONE ENCOUNTER
Patient called for REFILLS on Oxycodone. Coming up appointment is on 5/29.  Pharmacy verified as [facility] [place]  Pt phone number is [phone]

## 2025-05-07 ENCOUNTER — TELEPHONE (OUTPATIENT)
Dept: ORTHOPEDIC SURGERY | Facility: CLINIC | Age: 30
End: 2025-05-07
Payer: COMMERCIAL

## 2025-05-07 DIAGNOSIS — G43.019 INTRACTABLE MIGRAINE WITHOUT AURA AND WITHOUT STATUS MIGRAINOSUS: ICD-10-CM

## 2025-05-08 RX ORDER — OXYCODONE AND ACETAMINOPHEN 5; 325 MG/1; MG/1
1 TABLET ORAL EVERY 6 HOURS PRN
Qty: 28 TABLET | Refills: 0 | Status: SHIPPED | OUTPATIENT
Start: 2025-05-08 | End: 2025-05-15

## 2025-05-08 RX ORDER — AMITRIPTYLINE HYDROCHLORIDE 10 MG/1
TABLET ORAL
Qty: 270 TABLET | Refills: 1 | Status: SHIPPED | OUTPATIENT
Start: 2025-05-08

## 2025-05-13 DIAGNOSIS — M24.111 LABRAL TEAR OF SHOULDER, DEGENERATIVE, RIGHT: ICD-10-CM

## 2025-05-13 RX ORDER — OXYCODONE AND ACETAMINOPHEN 5; 325 MG/1; MG/1
1 TABLET ORAL EVERY 6 HOURS PRN
Qty: 28 TABLET | Refills: 0 | Status: SHIPPED | OUTPATIENT
Start: 2025-05-13 | End: 2025-05-20

## 2025-05-21 ENCOUNTER — TELEPHONE (OUTPATIENT)
Dept: ORTHOPEDIC SURGERY | Facility: CLINIC | Age: 30
End: 2025-05-21
Payer: COMMERCIAL

## 2025-05-21 DIAGNOSIS — M24.111 LABRAL TEAR OF SHOULDER, DEGENERATIVE, RIGHT: ICD-10-CM

## 2025-05-21 RX ORDER — CYCLOBENZAPRINE HCL 10 MG
10 TABLET ORAL 3 TIMES DAILY PRN
Qty: 30 TABLET | Refills: 0 | Status: SHIPPED | OUTPATIENT
Start: 2025-05-21 | End: 2025-05-31

## 2025-05-21 NOTE — TELEPHONE ENCOUNTER
Patient called stating he wanted to get a refill for OXY and if it wasn't possible to get that to see if he can get Muscle relaxer   Pharm [facility] [place]

## 2025-05-29 ENCOUNTER — HOSPITAL ENCOUNTER (OUTPATIENT)
Dept: RADIOLOGY | Facility: CLINIC | Age: 30
Discharge: HOME | End: 2025-05-29
Payer: COMMERCIAL

## 2025-05-29 ENCOUNTER — OFFICE VISIT (OUTPATIENT)
Dept: ORTHOPEDIC SURGERY | Facility: CLINIC | Age: 30
End: 2025-05-29
Payer: COMMERCIAL

## 2025-05-29 DIAGNOSIS — R21 RASH IN ADULT: ICD-10-CM

## 2025-05-29 DIAGNOSIS — M25.511 ACUTE PAIN OF RIGHT SHOULDER: ICD-10-CM

## 2025-05-29 DIAGNOSIS — M24.111 LABRAL TEAR OF SHOULDER, DEGENERATIVE, RIGHT: Primary | ICD-10-CM

## 2025-05-29 DIAGNOSIS — S46.111S LABRAL TEAR OF LONG HEAD OF BICEPS TENDON, RIGHT, SEQUELA: ICD-10-CM

## 2025-05-29 PROCEDURE — 99024 POSTOP FOLLOW-UP VISIT: CPT | Performed by: PHYSICIAN ASSISTANT

## 2025-05-29 PROCEDURE — 73030 X-RAY EXAM OF SHOULDER: CPT | Mod: RT

## 2025-05-29 PROCEDURE — 99212 OFFICE O/P EST SF 10 MIN: CPT | Performed by: PHYSICIAN ASSISTANT

## 2025-05-29 PROCEDURE — 73030 X-RAY EXAM OF SHOULDER: CPT | Mod: RIGHT SIDE | Performed by: ORTHOPAEDIC SURGERY

## 2025-05-29 RX ORDER — TIZANIDINE HYDROCHLORIDE 6 MG/1
6 CAPSULE, GELATIN COATED ORAL 3 TIMES DAILY
Qty: 30 CAPSULE | Refills: 0 | Status: SHIPPED | OUTPATIENT
Start: 2025-05-29 | End: 2025-06-08

## 2025-05-29 RX ORDER — PREDNISONE 10 MG/1
TABLET ORAL
Qty: 20 TABLET | Refills: 0 | Status: SHIPPED | OUTPATIENT
Start: 2025-05-29

## 2025-05-29 NOTE — PROGRESS NOTES
History of present illness patient with complex issues and injuries to both shoulders.  He has had 2 previous shoulder arthroscopies on the right shoulder.  He has been in the sling for a long time and is just recently started range of motion therapy.  He did have significant pain and spasm when he started physical therapy and so was concerned and wanted his arm checked out before proceeding.      Physical exam:      General: No acute distress or breathing difficulty or discomfort, pleasant and cooperative with the examination.    Extremities: Right shoulder incisions are well-healed intact no gross evidence of adhesions he has forward flexion up to 120 degrees external rotation of 50 degrees internal rotation posterior iliac crest abduction of 60 degrees.  He does have some ulnar neuritis on this right side most likely from being in the sling for so long.  Left shoulder patient has forward flexion to 140 degrees abduction of 90 degrees positive apprehension sign external rotation to 50 degrees internal rotation L4-5      Diagnostic studies: Left shoulder previous x-rays show no gross bony abnormality and they were read by Dr. Derrick Todd from previous visit.  Right shoulder x-rays 2 views today show long head biceps tenodesis button in good position they were read by Dr. Derrick Todd    Impression: Right shoulder tightness stiffness and ulnar neuritis with muscle spasm during range of motion.  Left shoulder probable labral tear    Plan: Right shoulder patient will continue gentle range of motion therapy program starting in supine position.  Will change him to a different muscle relaxer and put him on a prednisone burst.  It appears that he just has quite a bit of tendinitis and stiffness from being in the sling for a long period of time.  Patient has had to use his left arm significantly more during this period and is having a lot of pain and popping within his shoulder.  He has undergone conservative therapy  including NSAIDs, cortisone injection and physical therapy.  We will now order a katya MRI for the left shoulder as we suspect labral tear.  He will follow-up in 1 month for right shoulder range of motion check and to go over his MRI of the left shoulder

## 2025-05-30 ENCOUNTER — TELEPHONE (OUTPATIENT)
Dept: ORTHOPEDIC SURGERY | Facility: CLINIC | Age: 30
End: 2025-05-30
Payer: COMMERCIAL

## 2025-05-30 DIAGNOSIS — M25.511 ACUTE PAIN OF RIGHT SHOULDER: ICD-10-CM

## 2025-05-30 DIAGNOSIS — S46.111S LABRAL TEAR OF LONG HEAD OF BICEPS TENDON, RIGHT, SEQUELA: ICD-10-CM

## 2025-05-30 DIAGNOSIS — R21 RASH IN ADULT: ICD-10-CM

## 2025-05-30 DIAGNOSIS — M24.111 LABRAL TEAR OF SHOULDER, DEGENERATIVE, RIGHT: ICD-10-CM

## 2025-05-30 DIAGNOSIS — S43.432S LABRAL TEAR OF SHOULDER, LEFT, SEQUELA: Primary | ICD-10-CM

## 2025-06-04 NOTE — TELEPHONE ENCOUNTER
Patient had called that incorrect MRI was ordered. Went ahead and ordered the left shoulder as stated in the notes.  Patient understood.   Procedure End

## 2025-06-06 DIAGNOSIS — S43.432A LABRAL TEAR OF SHOULDER, LEFT, INITIAL ENCOUNTER: ICD-10-CM

## 2025-06-12 ENCOUNTER — APPOINTMENT (OUTPATIENT)
Dept: RADIOLOGY | Facility: HOSPITAL | Age: 30
End: 2025-06-12
Payer: COMMERCIAL

## 2025-06-23 ENCOUNTER — OFFICE VISIT (OUTPATIENT)
Dept: PRIMARY CARE CLINIC | Age: 30
End: 2025-06-23
Payer: COMMERCIAL

## 2025-06-23 VITALS
DIASTOLIC BLOOD PRESSURE: 72 MMHG | SYSTOLIC BLOOD PRESSURE: 120 MMHG | OXYGEN SATURATION: 97 % | BODY MASS INDEX: 34.8 KG/M2 | TEMPERATURE: 97.4 F | RESPIRATION RATE: 17 BRPM | WEIGHT: 235 LBS | HEART RATE: 94 BPM | HEIGHT: 69 IN

## 2025-06-23 DIAGNOSIS — G89.29 CHRONIC PAIN OF BOTH SHOULDERS: Primary | ICD-10-CM

## 2025-06-23 DIAGNOSIS — M25.511 CHRONIC PAIN OF BOTH SHOULDERS: Primary | ICD-10-CM

## 2025-06-23 DIAGNOSIS — M24.111 LABRAL TEAR OF SHOULDER, DEGENERATIVE, RIGHT: ICD-10-CM

## 2025-06-23 DIAGNOSIS — Z23 HEPATITIS B VACCINATION ADMINISTERED AT CURRENT VISIT: ICD-10-CM

## 2025-06-23 DIAGNOSIS — M25.512 CHRONIC PAIN OF BOTH SHOULDERS: Primary | ICD-10-CM

## 2025-06-23 DIAGNOSIS — I10 HYPERTENSION, UNSPECIFIED TYPE: ICD-10-CM

## 2025-06-23 PROCEDURE — 96372 THER/PROPH/DIAG INJ SC/IM: CPT | Performed by: INTERNAL MEDICINE

## 2025-06-23 PROCEDURE — 3078F DIAST BP <80 MM HG: CPT | Performed by: INTERNAL MEDICINE

## 2025-06-23 PROCEDURE — 99214 OFFICE O/P EST MOD 30 MIN: CPT | Performed by: INTERNAL MEDICINE

## 2025-06-23 PROCEDURE — 3074F SYST BP LT 130 MM HG: CPT | Performed by: INTERNAL MEDICINE

## 2025-06-23 RX ORDER — KETOROLAC TROMETHAMINE 30 MG/ML
60 INJECTION, SOLUTION INTRAMUSCULAR; INTRAVENOUS ONCE
Status: COMPLETED | OUTPATIENT
Start: 2025-06-23 | End: 2025-06-23

## 2025-06-23 RX ORDER — KETOROLAC TROMETHAMINE 10 MG/1
10 TABLET, FILM COATED ORAL EVERY 6 HOURS PRN
Qty: 20 TABLET | Refills: 0 | Status: SHIPPED | OUTPATIENT
Start: 2025-06-23 | End: 2026-06-23

## 2025-06-23 RX ORDER — TIZANIDINE HYDROCHLORIDE 6 MG/1
6 CAPSULE, GELATIN COATED ORAL 3 TIMES DAILY PRN
COMMUNITY
Start: 2025-05-29 | End: 2025-06-23

## 2025-06-23 RX ADMIN — KETOROLAC TROMETHAMINE 60 MG: 30 INJECTION, SOLUTION INTRAMUSCULAR; INTRAVENOUS at 10:44

## 2025-06-23 SDOH — ECONOMIC STABILITY: FOOD INSECURITY: WITHIN THE PAST 12 MONTHS, THE FOOD YOU BOUGHT JUST DIDN'T LAST AND YOU DIDN'T HAVE MONEY TO GET MORE.: NEVER TRUE

## 2025-06-23 SDOH — ECONOMIC STABILITY: FOOD INSECURITY: WITHIN THE PAST 12 MONTHS, YOU WORRIED THAT YOUR FOOD WOULD RUN OUT BEFORE YOU GOT MONEY TO BUY MORE.: NEVER TRUE

## 2025-06-23 ASSESSMENT — PATIENT HEALTH QUESTIONNAIRE - PHQ9
2. FEELING DOWN, DEPRESSED OR HOPELESS: NOT AT ALL
SUM OF ALL RESPONSES TO PHQ QUESTIONS 1-9: 0
1. LITTLE INTEREST OR PLEASURE IN DOING THINGS: NOT AT ALL
SUM OF ALL RESPONSES TO PHQ QUESTIONS 1-9: 0

## 2025-06-23 NOTE — PROGRESS NOTES
Subjective:      Patient ID: Joey Dick is a 30 y.o. male    Follow up   HPI  Pt presents for follow up regarding treatment of hypertension. Controlled on lisinopril. No lightheadedness or GI side effects.  No chest pain or SOB, no one-sided weakness or slurred speech.    B/l shoulder pain secondary to right labral tear and left shoulder sprain. S/p right labral tear repair 2 months ago. Pain in both shoulders persists. Awaiting left shoulder MRI.    Past Medical History:   Diagnosis Date    CAD (coronary artery disease)     murmur    Chronic pain     Hypertension     Low back pain      Past Surgical History:   Procedure Laterality Date    EYE SURGERY      laser    UPPER GASTROINTESTINAL ENDOSCOPY N/A 3/25/2019    EGD ESOPHAGOGASTRODUODENOSCOPY WITH DILATION performed by Elvin Leroy MD at [facility]    URETHRAL STRICTURE DILATATION      x2     Social History     Socioeconomic History    Marital status: Single     Spouse name: Not on file    Number of children: Not on file    Years of education: Not on file    Highest education level: Not on file   Occupational History    Not on file   Tobacco Use    Smoking status: Some Days     Types: Cigars    Smokeless tobacco: Never   Substance and Sexual Activity    Alcohol use: Yes     Comment: occassional    Drug use: No    Sexual activity: Yes     Partners: Female   Other Topics Concern    Not on file   Social History Narrative    Not on file     Social Drivers of Health     Financial Resource Strain: Low Risk  (12/23/2024)    Overall Financial Resource Strain (CARDIA)     Difficulty of Paying Living Expenses: Not hard at all   Food Insecurity: No Food Insecurity (6/23/2025)    Hunger Vital Sign     Worried About Running Out of Food in the Last Year: Never true     Ran Out of Food in the Last Year: Never true   Transportation Needs: No Transportation Needs (6/23/2025)    PRAPARE - Transportation     Lack of Transportation (Medical): No     Lack of

## 2025-06-23 NOTE — PROGRESS NOTES
After obtaining consent, and per orders of Dr. Rodas, injection of Toradol 60mg given in Right upper quad. gluteus by Miladys Nolan MA. Patient instructed to remain in clinic for 20 minutes afterwards, and to report any adverse reaction to me immediately.

## 2025-07-02 ENCOUNTER — OFFICE VISIT (OUTPATIENT)
Dept: ORTHOPEDIC SURGERY | Facility: CLINIC | Age: 30
End: 2025-07-02
Payer: COMMERCIAL

## 2025-07-02 DIAGNOSIS — S46.111S LABRAL TEAR OF LONG HEAD OF BICEPS TENDON, RIGHT, SEQUELA: ICD-10-CM

## 2025-07-02 PROCEDURE — 99212 OFFICE O/P EST SF 10 MIN: CPT | Performed by: ORTHOPAEDIC SURGERY

## 2025-07-02 NOTE — PROGRESS NOTES
History of present illness: History of right shoulder posterior superior labral reconstruction    Physical exam:    General: No acute distress or breathing difficulty or discomfort, pleasant and cooperative with the examination.    Extremities: Dressing was removed if in place .  The surgical incision was clean and dry without drainage or infection.  The soft tissues were otherwise intact.  There was no abnormal limb swelling or evidence to indicate blood clots or deep vein thrombosis.    There is no gross evidence of redness or cellulitis.    Motion was within normal limits for postop visit.  The patient could move the extremity on the operative limb in a normal fashion without significant change.  Neurovascular status was unchanged.    Mild pain over the tricep with some weakness overhead    No evidence of instability no posterior shift or sulcus sign negative speeds sign ecchymosis bruising swelling edema is well-controlled motion is improved with forward flexion now up to 160 and abduction to 70    Diagnostic studies: No new    Impression: Right shoulder posterior superior labral repair    Plan: Begin strength conditioning stretching program for the right shoulder    I will see him back in 3 weeks check range of motion on the right and we will go over the MRI left shoulder

## 2025-07-07 ENCOUNTER — HOSPITAL ENCOUNTER (OUTPATIENT)
Dept: RADIOLOGY | Facility: HOSPITAL | Age: 30
Discharge: HOME | End: 2025-07-07
Payer: COMMERCIAL

## 2025-07-07 DIAGNOSIS — S43.432A LABRAL TEAR OF SHOULDER, LEFT, INITIAL ENCOUNTER: ICD-10-CM

## 2025-07-07 PROCEDURE — 2500000004 HC RX 250 GENERAL PHARMACY W/ HCPCS (ALT 636 FOR OP/ED): Performed by: STUDENT IN AN ORGANIZED HEALTH CARE EDUCATION/TRAINING PROGRAM

## 2025-07-07 PROCEDURE — 73222 MRI JOINT UPR EXTREM W/DYE: CPT | Mod: LT

## 2025-07-07 PROCEDURE — 73222 MRI JOINT UPR EXTREM W/DYE: CPT | Mod: LEFT SIDE | Performed by: STUDENT IN AN ORGANIZED HEALTH CARE EDUCATION/TRAINING PROGRAM

## 2025-07-07 PROCEDURE — 2500000005 HC RX 250 GENERAL PHARMACY W/O HCPCS: Performed by: STUDENT IN AN ORGANIZED HEALTH CARE EDUCATION/TRAINING PROGRAM

## 2025-07-07 PROCEDURE — 2550000001 HC RX 255 CONTRASTS: Performed by: ORTHOPAEDIC SURGERY

## 2025-07-07 PROCEDURE — A9575 INJ GADOTERATE MEGLUMI 0.1ML: HCPCS | Performed by: ORTHOPAEDIC SURGERY

## 2025-07-07 PROCEDURE — 77002 NEEDLE LOCALIZATION BY XRAY: CPT | Mod: LT

## 2025-07-07 PROCEDURE — 77002 NEEDLE LOCALIZATION BY XRAY: CPT | Mod: LEFT SIDE | Performed by: STUDENT IN AN ORGANIZED HEALTH CARE EDUCATION/TRAINING PROGRAM

## 2025-07-07 PROCEDURE — 23350 INJECTION FOR SHOULDER X-RAY: CPT | Mod: LEFT SIDE | Performed by: STUDENT IN AN ORGANIZED HEALTH CARE EDUCATION/TRAINING PROGRAM

## 2025-07-07 RX ORDER — GADOTERATE MEGLUMINE 376.9 MG/ML
0.1 INJECTION INTRAVENOUS
Status: COMPLETED | OUTPATIENT
Start: 2025-07-07 | End: 2025-07-07

## 2025-07-07 RX ORDER — SODIUM CHLORIDE 9 MG/ML
30 INJECTION INTRAMUSCULAR; INTRAVENOUS; SUBCUTANEOUS
Status: COMPLETED | OUTPATIENT
Start: 2025-07-07 | End: 2025-07-07

## 2025-07-07 RX ORDER — LIDOCAINE HYDROCHLORIDE 20 MG/ML
20 INJECTION, SOLUTION INFILTRATION; PERINEURAL ONCE
Status: COMPLETED | OUTPATIENT
Start: 2025-07-07 | End: 2025-07-07

## 2025-07-07 RX ADMIN — GADOTERATE MEGLUMINE 0.1 ML: 376.9 INJECTION INTRAVENOUS at 13:41

## 2025-07-07 RX ADMIN — IOHEXOL 3 ML: 300 INJECTION, SOLUTION INTRAVENOUS at 13:41

## 2025-07-07 RX ADMIN — LIDOCAINE HYDROCHLORIDE 3 ML: 20 INJECTION, SOLUTION INFILTRATION; PERINEURAL at 13:40

## 2025-07-07 RX ADMIN — SODIUM CHLORIDE 10 ML: 9 INJECTION, SOLUTION INTRAMUSCULAR; INTRAVENOUS; SUBCUTANEOUS at 13:40

## 2025-07-21 ENCOUNTER — APPOINTMENT (OUTPATIENT)
Dept: ORTHOPEDIC SURGERY | Facility: CLINIC | Age: 30
End: 2025-07-21
Payer: COMMERCIAL

## 2025-07-21 DIAGNOSIS — S43.402A SPRAIN OF LEFT SHOULDER, INITIAL ENCOUNTER: ICD-10-CM

## 2025-07-21 DIAGNOSIS — S46.111S LABRAL TEAR OF LONG HEAD OF BICEPS TENDON, RIGHT, SEQUELA: ICD-10-CM

## 2025-07-21 DIAGNOSIS — S40.012A CONTUSION OF LEFT SHOULDER, INITIAL ENCOUNTER: ICD-10-CM

## 2025-07-21 PROCEDURE — 99213 OFFICE O/P EST LOW 20 MIN: CPT | Performed by: ORTHOPAEDIC SURGERY

## 2025-07-21 NOTE — LETTER
July 21, 2025     Patient: Hair Lew   YOB: 1995   Date of Visit: 7/21/2025       To Whom It May Concern:    It is my medical opinion that Hair Lew should remain out of work until 11/1/25.    If you have any questions or concerns, please don't hesitate to call.         Sincerely,        Derrick Todd MD    CC: No Recipients

## 2025-07-21 NOTE — PROGRESS NOTES
History of present illness: Patient with a history of instability left shoulder    Patient has had 2 surgeries right with a revision labral reconstruction done on 4/1/2025    He continues to rehab the right shoulder and follows up after imaging of the left shoulder    Physical exam:    General: No acute distress or breathing difficulty or discomfort, pleasant and cooperative with the examination.    Extremities: Left shoulder exam    Mild soreness with flexion extension and push pull lifting    Mild sulcus sign    Mild speeds sign    Secondary impingement type I and II    Full range of motion with flexion abduction rotation    He is motor intact he can move elbow hand and wrist overall slight multidirectional hyper instability left shoulder    Right shoulder exam    Incisions clean and dry ecchymosis bruising swelling edema well-controlled    He has good flexion now to 160 but does have some popping with straight abduction    There is still considerable weakness with lift and push pull some mild apprehension some secondary impingement    Surgical incisions are well clean healed and dry    Diagnostic studies: MRI left shoulder shows no structural labral tearing    Impression: Multidirectional instability left shoulder    Recurrent labral reconstruction 3 months out right shoulder    Plan: The patient will be out of work an additional 3 months for 6 to 9 months    The patient is always going to have permanent lifting restrictions due to his ligamentous laxity multidirectional instability and revision labral reconstruction right shoulder this will render him with a lifetime push pull lifting limit probably in around 6 months at around 10 pounds or less all below shoulder on a permanent basis in order to prevent recurrent instability bilaterally    We will see him back in 3 months to reassess strength and range of motion check    If he continues to get some popping and catching follow-up MRI of the right shoulder with  arthrogram labral evaluation for a recurrent tear or healing would be indicated

## 2025-10-20 ENCOUNTER — APPOINTMENT (OUTPATIENT)
Dept: ORTHOPEDIC SURGERY | Facility: CLINIC | Age: 30
End: 2025-10-20
Payer: COMMERCIAL

## (undated) DEVICE — SUTURE, ETHILON, 3-0, 18 IN, PS1, BLACK

## (undated) DEVICE — GLOVE, SURGICAL, PROTEXIS PI BLUE W/NEUTHERA, 8.0, PF, LF

## (undated) DEVICE — PROTECTOR, NERVE, ULNAR, PINK

## (undated) DEVICE — SUTURE, PROLENE, 0, 30 IN, FSLX, BLUE

## (undated) DEVICE — STRAP, ARM BOARD, 32 X 1.5

## (undated) DEVICE — BOWL, BASIN, 32 OZ, STERILE

## (undated) DEVICE — PROBE, CRUISE ENERGY, ARTHOSCOPIC SERFAS 90-S 4.0MM

## (undated) DEVICE — PREP, IODOPHOR, W/ALCOHOL, DURAPREP, W/APPLICATOR, 26 CC

## (undated) DEVICE — STRAP, VELCRO, BODY, 4 X 60IN, NS

## (undated) DEVICE — MAT, FLOOR, STANDARD FLUID BARRIER, 32X44, GREEN

## (undated) DEVICE — GOWN, SURGICAL, ROYAL SILK, LG, STERILE

## (undated) DEVICE — MANIFOLD, 4 PORT NEPTUNE STANDARD

## (undated) DEVICE — TUBING, SUCTION, 6MM X 10, CLEAN N-COND

## (undated) DEVICE — GOWN, SURGICAL, ROYAL SILK, XL, STERILE

## (undated) DEVICE — Device

## (undated) DEVICE — GLOVE, SURGICAL, PROTEXIS PI , 7.5, PF, LF

## (undated) DEVICE — POSITIONER, CRADLE, HEAD, MEDC, FOAM SLOTTED

## (undated) DEVICE — DRAPE, SHEET, 17 X 23 IN